# Patient Record
Sex: MALE | Race: WHITE | NOT HISPANIC OR LATINO | Employment: FULL TIME | URBAN - NONMETROPOLITAN AREA
[De-identification: names, ages, dates, MRNs, and addresses within clinical notes are randomized per-mention and may not be internally consistent; named-entity substitution may affect disease eponyms.]

---

## 2020-05-01 ENCOUNTER — HISTORICAL (OUTPATIENT)
Dept: ADMINISTRATIVE | Facility: HOSPITAL | Age: 35
End: 2020-05-01

## 2020-05-02 LAB — CALCIUM BLD-MCNC: NEGATIVE MG/DL

## 2021-01-14 ENCOUNTER — IMMUNIZATION (OUTPATIENT)
Dept: OBSTETRICS AND GYNECOLOGY | Facility: CLINIC | Age: 36
End: 2021-01-14
Payer: COMMERCIAL

## 2021-01-14 DIAGNOSIS — Z23 NEED FOR VACCINATION: ICD-10-CM

## 2021-01-14 PROCEDURE — 91300 COVID-19, MRNA, LNP-S, PF, 30 MCG/0.3 ML DOSE VACCINE: CPT | Mod: PBBFAC

## 2021-02-04 ENCOUNTER — IMMUNIZATION (OUTPATIENT)
Dept: OBSTETRICS AND GYNECOLOGY | Facility: CLINIC | Age: 36
End: 2021-02-04
Payer: COMMERCIAL

## 2021-02-04 DIAGNOSIS — Z23 NEED FOR VACCINATION: Primary | ICD-10-CM

## 2021-02-04 PROCEDURE — 0002A COVID-19, MRNA, LNP-S, PF, 30 MCG/0.3 ML DOSE VACCINE: CPT | Mod: PBBFAC | Performed by: ANESTHESIOLOGY

## 2021-02-04 PROCEDURE — 91300 COVID-19, MRNA, LNP-S, PF, 30 MCG/0.3 ML DOSE VACCINE: CPT | Mod: PBBFAC | Performed by: ANESTHESIOLOGY

## 2021-03-18 PROBLEM — R03.0 ELEVATED BP WITHOUT DIAGNOSIS OF HYPERTENSION: Status: ACTIVE | Noted: 2021-03-18

## 2021-03-18 PROBLEM — R53.83 FATIGUE: Status: ACTIVE | Noted: 2021-03-18

## 2021-03-18 PROBLEM — R40.0 DAYTIME SOMNOLENCE: Status: RESOLVED | Noted: 2021-03-18 | Resolved: 2021-03-18

## 2021-03-18 PROBLEM — R40.0 DAYTIME SOMNOLENCE: Status: ACTIVE | Noted: 2021-03-18

## 2021-03-18 PROBLEM — F41.1 GAD (GENERALIZED ANXIETY DISORDER): Status: ACTIVE | Noted: 2021-03-18

## 2021-05-07 ENCOUNTER — LAB VISIT (OUTPATIENT)
Dept: LAB | Facility: HOSPITAL | Age: 36
End: 2021-05-07
Attending: INTERNAL MEDICINE
Payer: COMMERCIAL

## 2021-05-07 DIAGNOSIS — Z13.29 SCREENING FOR THYROID DISORDER: ICD-10-CM

## 2021-05-07 DIAGNOSIS — Z79.899 ENCOUNTER FOR LONG-TERM (CURRENT) USE OF OTHER MEDICATIONS: ICD-10-CM

## 2021-05-07 DIAGNOSIS — R53.83 FATIGUE, UNSPECIFIED TYPE: ICD-10-CM

## 2021-05-07 DIAGNOSIS — Z13.0 SCREENING, ANEMIA, DEFICIENCY, IRON: ICD-10-CM

## 2021-05-07 DIAGNOSIS — Z13.6 SCREENING FOR CARDIOVASCULAR CONDITION: ICD-10-CM

## 2021-05-07 DIAGNOSIS — Z13.220 SCREENING FOR LIPOID DISORDERS: ICD-10-CM

## 2021-05-07 DIAGNOSIS — Z13.1 SCREENING FOR DIABETES MELLITUS: ICD-10-CM

## 2021-05-07 DIAGNOSIS — R03.0 ELEVATED BP WITHOUT DIAGNOSIS OF HYPERTENSION: ICD-10-CM

## 2021-05-07 LAB
25(OH)D3+25(OH)D2 SERPL-MCNC: 26 NG/ML (ref 30–96)
ALBUMIN SERPL BCP-MCNC: 4.1 G/DL (ref 3.5–5.2)
ALP SERPL-CCNC: 74 U/L (ref 55–135)
ALT SERPL W/O P-5'-P-CCNC: 27 U/L (ref 10–44)
ANION GAP SERPL CALC-SCNC: 2 MMOL/L (ref 8–16)
AST SERPL-CCNC: 13 U/L (ref 10–40)
BASOPHILS # BLD AUTO: 0.02 K/UL (ref 0–0.2)
BASOPHILS NFR BLD: 0.4 % (ref 0–1.9)
BILIRUB SERPL-MCNC: 0.6 MG/DL (ref 0.1–1)
BUN SERPL-MCNC: 13 MG/DL (ref 6–20)
CALCIUM SERPL-MCNC: 8.9 MG/DL (ref 8.7–10.5)
CHLORIDE SERPL-SCNC: 108 MMOL/L (ref 95–110)
CHOLEST SERPL-MCNC: 210 MG/DL (ref 120–199)
CHOLEST/HDLC SERPL: 4.6 {RATIO} (ref 2–5)
CO2 SERPL-SCNC: 32 MMOL/L (ref 23–29)
CREAT SERPL-MCNC: 1 MG/DL (ref 0.5–1.4)
CRP SERPL-MCNC: 0.89 MG/L (ref 0–3.19)
DIFFERENTIAL METHOD: NORMAL
EOSINOPHIL # BLD AUTO: 0.1 K/UL (ref 0–0.5)
EOSINOPHIL NFR BLD: 1.7 % (ref 0–8)
ERYTHROCYTE [DISTWIDTH] IN BLOOD BY AUTOMATED COUNT: 11.7 % (ref 11.5–14.5)
EST. GFR  (AFRICAN AMERICAN): >60 ML/MIN/1.73 M^2
EST. GFR  (NON AFRICAN AMERICAN): >60 ML/MIN/1.73 M^2
ESTIMATED AVG GLUCOSE: 103 MG/DL (ref 68–131)
FOLATE SERPL-MCNC: 15.3 NG/ML (ref 4–24)
GLUCOSE SERPL-MCNC: 98 MG/DL (ref 70–110)
HBA1C MFR BLD: 5.2 % (ref 4–5.6)
HCT VFR BLD AUTO: 45.4 % (ref 40–54)
HCYS SERPL-SCNC: 6.9 UMOL/L (ref 4–16.5)
HDLC SERPL-MCNC: 46 MG/DL (ref 40–75)
HDLC SERPL: 21.9 % (ref 20–50)
HGB BLD-MCNC: 15.2 G/DL (ref 14–18)
IMM GRANULOCYTES # BLD AUTO: 0.01 K/UL (ref 0–0.04)
IMM GRANULOCYTES NFR BLD AUTO: 0.2 % (ref 0–0.5)
INSULIN COLLECTION INTERVAL: NORMAL
INSULIN SERPL-ACNC: 5.4 UU/ML
LDLC SERPL CALC-MCNC: 140.2 MG/DL (ref 63–159)
LYMPHOCYTES # BLD AUTO: 1.6 K/UL (ref 1–4.8)
LYMPHOCYTES NFR BLD: 34.6 % (ref 18–48)
MAGNESIUM SERPL-MCNC: 2.1 MG/DL (ref 1.6–2.6)
MCH RBC QN AUTO: 29.7 PG (ref 27–31)
MCHC RBC AUTO-ENTMCNC: 33.5 G/DL (ref 32–36)
MCV RBC AUTO: 89 FL (ref 82–98)
MONOCYTES # BLD AUTO: 0.5 K/UL (ref 0.3–1)
MONOCYTES NFR BLD: 10.2 % (ref 4–15)
NEUTROPHILS # BLD AUTO: 2.4 K/UL (ref 1.8–7.7)
NEUTROPHILS NFR BLD: 52.9 % (ref 38–73)
NONHDLC SERPL-MCNC: 164 MG/DL
NRBC BLD-RTO: 0 /100 WBC
PLATELET # BLD AUTO: 229 K/UL (ref 150–450)
PMV BLD AUTO: 9.5 FL (ref 9.2–12.9)
POTASSIUM SERPL-SCNC: 4 MMOL/L (ref 3.5–5.1)
PROT SERPL-MCNC: 7.4 G/DL (ref 6–8.4)
RBC # BLD AUTO: 5.11 M/UL (ref 4.6–6.2)
SODIUM SERPL-SCNC: 142 MMOL/L (ref 136–145)
T3FREE SERPL-MCNC: 2.7 PG/ML (ref 2.3–4.2)
T4 FREE SERPL-MCNC: 0.86 NG/DL (ref 0.71–1.51)
TESTOST SERPL-MCNC: 726 NG/DL (ref 304–1227)
TRIGL SERPL-MCNC: 119 MG/DL (ref 30–150)
TSH SERPL DL<=0.005 MIU/L-ACNC: 1.47 UIU/ML (ref 0.4–4)
VIT B12 SERPL-MCNC: 347 PG/ML (ref 210–950)
WBC # BLD AUTO: 4.6 K/UL (ref 3.9–12.7)

## 2021-05-07 PROCEDURE — 84443 ASSAY THYROID STIM HORMONE: CPT | Performed by: INTERNAL MEDICINE

## 2021-05-07 PROCEDURE — 83525 ASSAY OF INSULIN: CPT | Performed by: INTERNAL MEDICINE

## 2021-05-07 PROCEDURE — 83036 HEMOGLOBIN GLYCOSYLATED A1C: CPT | Performed by: INTERNAL MEDICINE

## 2021-05-07 PROCEDURE — 84403 ASSAY OF TOTAL TESTOSTERONE: CPT | Performed by: INTERNAL MEDICINE

## 2021-05-07 PROCEDURE — 85025 COMPLETE CBC W/AUTO DIFF WBC: CPT | Performed by: INTERNAL MEDICINE

## 2021-05-07 PROCEDURE — 83735 ASSAY OF MAGNESIUM: CPT | Performed by: INTERNAL MEDICINE

## 2021-05-07 PROCEDURE — 80061 LIPID PANEL: CPT | Performed by: INTERNAL MEDICINE

## 2021-05-07 PROCEDURE — 83090 ASSAY OF HOMOCYSTEINE: CPT | Performed by: INTERNAL MEDICINE

## 2021-05-07 PROCEDURE — 82607 VITAMIN B-12: CPT | Performed by: INTERNAL MEDICINE

## 2021-05-07 PROCEDURE — 84481 FREE ASSAY (FT-3): CPT | Performed by: INTERNAL MEDICINE

## 2021-05-07 PROCEDURE — 82746 ASSAY OF FOLIC ACID SERUM: CPT | Performed by: INTERNAL MEDICINE

## 2021-05-07 PROCEDURE — 86141 C-REACTIVE PROTEIN HS: CPT | Performed by: INTERNAL MEDICINE

## 2021-05-07 PROCEDURE — 82172 ASSAY OF APOLIPOPROTEIN: CPT | Performed by: INTERNAL MEDICINE

## 2021-05-07 PROCEDURE — 82306 VITAMIN D 25 HYDROXY: CPT | Performed by: INTERNAL MEDICINE

## 2021-05-07 PROCEDURE — 80053 COMPREHEN METABOLIC PANEL: CPT | Performed by: INTERNAL MEDICINE

## 2021-05-07 PROCEDURE — 83695 ASSAY OF LIPOPROTEIN(A): CPT | Performed by: INTERNAL MEDICINE

## 2021-05-07 PROCEDURE — 84439 ASSAY OF FREE THYROXINE: CPT | Performed by: INTERNAL MEDICINE

## 2021-05-10 LAB
APO A-I SERPL-MCNC: 146 MG/DL
APO B SERPL-MCNC: 110 MG/DL
APOLIPOPROTEIN B/A1 RATIO: 0.8

## 2021-05-11 LAB — LPA SERPL-MCNC: 14 MG/DL (ref 0–30)

## 2021-10-21 DIAGNOSIS — Z23 NEED FOR VACCINATION: Primary | ICD-10-CM

## 2021-10-21 PROCEDURE — 0003A COVID-19, MRNA, LNP-S, PF, 30 MCG/0.3 ML DOSE VACCINE: CPT | Mod: PBBFAC | Performed by: ANESTHESIOLOGY

## 2021-10-21 PROCEDURE — 91300 COVID-19, MRNA, LNP-S, PF, 30 MCG/0.3 ML DOSE VACCINE: CPT | Mod: PBBFAC | Performed by: ANESTHESIOLOGY

## 2022-03-17 ENCOUNTER — CLINICAL SUPPORT (OUTPATIENT)
Dept: OTHER | Facility: CLINIC | Age: 37
End: 2022-03-17

## 2022-03-17 DIAGNOSIS — Z00.8 ENCOUNTER FOR OTHER GENERAL EXAMINATION: ICD-10-CM

## 2022-03-18 VITALS — BODY MASS INDEX: 27.55 KG/M2 | HEIGHT: 70 IN

## 2022-03-18 LAB
HDLC SERPL-MCNC: 47 MG/DL
POC CHOLESTEROL, LDL (DOCK): 164.8 MG/DL
POC CHOLESTEROL, TOTAL: 224 MG/DL
POC GLUCOSE, FASTING: 95 MG/DL (ref 60–110)
TRIGL SERPL-MCNC: 61 MG/DL

## 2023-12-21 ENCOUNTER — HOSPITAL ENCOUNTER (OUTPATIENT)
Dept: RADIOLOGY | Facility: HOSPITAL | Age: 38
Discharge: HOME | End: 2023-12-21
Attending: INTERNAL MEDICINE
Payer: COMMERCIAL

## 2023-12-21 DIAGNOSIS — R42 DIZZINESS AND GIDDINESS: ICD-10-CM

## 2023-12-21 PROBLEM — R27.0 ATAXIA: Status: ACTIVE | Noted: 2023-12-21

## 2023-12-21 PROCEDURE — A9585 GADOBUTROL INJECTION: HCPCS | Performed by: INTERNAL MEDICINE

## 2023-12-21 PROCEDURE — 70553 MRI BRAIN STEM W/O & W/DYE: CPT | Mod: TC

## 2023-12-21 PROCEDURE — 25500020 PHARM REV CODE 255: Performed by: INTERNAL MEDICINE

## 2023-12-21 RX ADMIN — GADOBUTROL 10 ML: 604.72 INJECTION INTRAVENOUS at 03:12

## 2023-12-22 ENCOUNTER — TELEPHONE (OUTPATIENT)
Dept: NEUROSURGERY | Facility: CLINIC | Age: 38
End: 2023-12-22
Payer: COMMERCIAL

## 2023-12-26 ENCOUNTER — TELEPHONE (OUTPATIENT)
Dept: NEUROSURGERY | Facility: CLINIC | Age: 38
End: 2023-12-26
Payer: COMMERCIAL

## 2023-12-26 ENCOUNTER — OFFICE VISIT (OUTPATIENT)
Dept: NEUROSURGERY | Facility: CLINIC | Age: 38
End: 2023-12-26
Payer: COMMERCIAL

## 2023-12-26 ENCOUNTER — HOSPITAL ENCOUNTER (OUTPATIENT)
Dept: RADIOLOGY | Facility: HOSPITAL | Age: 38
Discharge: HOME | End: 2023-12-26
Attending: NEUROLOGICAL SURGERY
Payer: COMMERCIAL

## 2023-12-26 ENCOUNTER — LAB VISIT (OUTPATIENT)
Dept: LAB | Facility: HOSPITAL | Age: 38
End: 2023-12-26
Attending: NEUROLOGICAL SURGERY
Payer: COMMERCIAL

## 2023-12-26 VITALS
SYSTOLIC BLOOD PRESSURE: 144 MMHG | HEART RATE: 87 BPM | BODY MASS INDEX: 29.35 KG/M2 | HEIGHT: 70 IN | WEIGHT: 205 LBS | DIASTOLIC BLOOD PRESSURE: 98 MMHG

## 2023-12-26 DIAGNOSIS — G93.89 BRAIN MASS: Primary | ICD-10-CM

## 2023-12-26 DIAGNOSIS — G93.89 BRAIN MASS: ICD-10-CM

## 2023-12-26 PROCEDURE — A9585 GADOBUTROL INJECTION: HCPCS | Performed by: NEUROLOGICAL SURGERY

## 2023-12-26 PROCEDURE — 70552 MRI BRAIN STEM W/DYE: CPT | Mod: 26,,, | Performed by: RADIOLOGY

## 2023-12-26 PROCEDURE — 87086 URINE CULTURE/COLONY COUNT: CPT | Performed by: NEUROLOGICAL SURGERY

## 2023-12-26 PROCEDURE — 85730 THROMBOPLASTIN TIME PARTIAL: CPT | Performed by: NEUROLOGICAL SURGERY

## 2023-12-26 PROCEDURE — 80053 COMPREHEN METABOLIC PANEL: CPT | Performed by: NEUROLOGICAL SURGERY

## 2023-12-26 PROCEDURE — 86901 BLOOD TYPING SEROLOGIC RH(D): CPT | Performed by: NEUROLOGICAL SURGERY

## 2023-12-26 PROCEDURE — 85610 PROTHROMBIN TIME: CPT | Performed by: NEUROLOGICAL SURGERY

## 2023-12-26 PROCEDURE — 3077F SYST BP >= 140 MM HG: CPT | Mod: CPTII,S$GLB,, | Performed by: NEUROLOGICAL SURGERY

## 2023-12-26 PROCEDURE — 36415 COLL VENOUS BLD VENIPUNCTURE: CPT | Performed by: NEUROLOGICAL SURGERY

## 2023-12-26 PROCEDURE — 99204 PR OFFICE/OUTPT VISIT, NEW, LEVL IV, 45-59 MIN: ICD-10-PCS | Mod: S$GLB,,, | Performed by: NEUROLOGICAL SURGERY

## 2023-12-26 PROCEDURE — 3008F PR BODY MASS INDEX (BMI) DOCUMENTED: ICD-10-PCS | Mod: CPTII,S$GLB,, | Performed by: NEUROLOGICAL SURGERY

## 2023-12-26 PROCEDURE — 3008F BODY MASS INDEX DOCD: CPT | Mod: CPTII,S$GLB,, | Performed by: NEUROLOGICAL SURGERY

## 2023-12-26 PROCEDURE — 1159F PR MEDICATION LIST DOCUMENTED IN MEDICAL RECORD: ICD-10-PCS | Mod: CPTII,S$GLB,, | Performed by: NEUROLOGICAL SURGERY

## 2023-12-26 PROCEDURE — 25500020 PHARM REV CODE 255: Performed by: NEUROLOGICAL SURGERY

## 2023-12-26 PROCEDURE — 70552 MRI BRAIN STEALTH W/O FIDUCIALS WITH CONTRAST: ICD-10-PCS | Mod: 26,,, | Performed by: RADIOLOGY

## 2023-12-26 PROCEDURE — 3077F PR MOST RECENT SYSTOLIC BLOOD PRESSURE >= 140 MM HG: ICD-10-PCS | Mod: CPTII,S$GLB,, | Performed by: NEUROLOGICAL SURGERY

## 2023-12-26 PROCEDURE — 85025 COMPLETE CBC W/AUTO DIFF WBC: CPT | Performed by: NEUROLOGICAL SURGERY

## 2023-12-26 PROCEDURE — 3080F PR MOST RECENT DIASTOLIC BLOOD PRESSURE >= 90 MM HG: ICD-10-PCS | Mod: CPTII,S$GLB,, | Performed by: NEUROLOGICAL SURGERY

## 2023-12-26 PROCEDURE — 99999 PR PBB SHADOW E&M-EST. PATIENT-LVL III: CPT | Mod: PBBFAC,,, | Performed by: NEUROLOGICAL SURGERY

## 2023-12-26 PROCEDURE — 99204 OFFICE O/P NEW MOD 45 MIN: CPT | Mod: S$GLB,,, | Performed by: NEUROLOGICAL SURGERY

## 2023-12-26 PROCEDURE — 81001 URINALYSIS AUTO W/SCOPE: CPT | Performed by: NEUROLOGICAL SURGERY

## 2023-12-26 PROCEDURE — 99999 PR PBB SHADOW E&M-EST. PATIENT-LVL III: ICD-10-PCS | Mod: PBBFAC,,, | Performed by: NEUROLOGICAL SURGERY

## 2023-12-26 PROCEDURE — 1159F MED LIST DOCD IN RCRD: CPT | Mod: CPTII,S$GLB,, | Performed by: NEUROLOGICAL SURGERY

## 2023-12-26 PROCEDURE — 3080F DIAST BP >= 90 MM HG: CPT | Mod: CPTII,S$GLB,, | Performed by: NEUROLOGICAL SURGERY

## 2023-12-26 PROCEDURE — 1160F PR REVIEW ALL MEDS BY PRESCRIBER/CLIN PHARMACIST DOCUMENTED: ICD-10-PCS | Mod: CPTII,S$GLB,, | Performed by: NEUROLOGICAL SURGERY

## 2023-12-26 PROCEDURE — 1160F RVW MEDS BY RX/DR IN RCRD: CPT | Mod: CPTII,S$GLB,, | Performed by: NEUROLOGICAL SURGERY

## 2023-12-26 PROCEDURE — 70552 MRI BRAIN STEM W/DYE: CPT | Mod: TC

## 2023-12-26 RX ORDER — GADOBUTROL 604.72 MG/ML
10 INJECTION INTRAVENOUS
Status: COMPLETED | OUTPATIENT
Start: 2023-12-26 | End: 2023-12-26

## 2023-12-26 RX ADMIN — GADOBUTROL 10 ML: 604.72 INJECTION INTRAVENOUS at 01:12

## 2023-12-26 NOTE — PROGRESS NOTES
Neurosurgery  History & Physical    SUBJECTIVE:     Chief Complaint: Imbalance    History of Present Illness:  Mr. Sukumar Wiseman this is a very pleasant 38-year-old man who was otherwise healthy.  Patient reports he began having episodes of imbalance more than a month ago.  He reports that he has not had any falls but feels as if he might.  The patient reports that he has not had any other symptoms.  He does report he has had intermittent headaches but believe that those are secondary to stress.  The patient was seen with his wife who also added to the history.  They both report that he has been otherwise healthy and has no significant medical problems.  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    Review of patient's allergies indicates:  No Known Allergies    Current Outpatient Medications   Medication Sig Dispense Refill    ondansetron (ZOFRAN-ODT) 4 MG TbDL Take 1 tablet (4 mg total) by mouth 3 (three) times daily as needed (nausea and vomiting). 30 tablet 0     No current facility-administered medications for this visit.       History reviewed. No pertinent past medical history.  History reviewed. No pertinent surgical history.  Family History       Problem Relation (Age of Onset)    Colon cancer Maternal Grandfather    Hyperlipidemia Mother    Hypertension Mother, Father          Social History     Socioeconomic History    Marital status:    Tobacco Use    Smoking status: Never   Substance and Sexual Activity    Alcohol use: Yes     Social Determinants of Health     Financial Resource Strain: Low Risk  (12/25/2023)    Overall Financial Resource Strain (CARDIA)     Difficulty of Paying Living Expenses: Not hard at all   Food Insecurity: No Food Insecurity (12/25/2023)    Hunger Vital Sign     Worried About Running Out of Food in the Last Year: Never true     Ran Out of Food in the Last Year: Never true  "  Transportation Needs: No Transportation Needs (12/25/2023)    PRAPARE - Transportation     Lack of Transportation (Medical): No     Lack of Transportation (Non-Medical): No   Physical Activity: Insufficiently Active (12/25/2023)    Exercise Vital Sign     Days of Exercise per Week: 3 days     Minutes of Exercise per Session: 30 min   Stress: Stress Concern Present (12/25/2023)    [employer] [place] [employer] - Occupational Stress Questionnaire     Feeling of Stress : Very much   Social Connections: Unknown (12/25/2023)    Social Connection and Isolation Panel [NHANES]     Frequency of Communication with Friends and Family: More than three times a week     Frequency of Social Gatherings with Friends and Family: Once a week     Active Member of Clubs or Organizations: No     Attends Club or Organization Meetings: Patient declined     Marital Status:    Housing Stability: Low Risk  (12/25/2023)    Housing Stability Vital Sign     Unable to Pay for Housing in the Last Year: No     Number of Places Lived in the Last Year: 1     Unstable Housing in the Last Year: No       Review of Systems   Constitutional: Negative.    HENT: Negative.     Eyes: Negative.    Neurological:  Positive for dizziness and headaches. Negative for tremors, seizures, syncope, facial asymmetry, speech difficulty, weakness, light-headedness and numbness.   Psychiatric/Behavioral: Negative.         OBJECTIVE:     Vital Signs  Pulse: 87  BP: (!) 144/98  Pain Score: 0-No pain  Height: 5' 10" (177.8 cm)  Weight: 93 kg (205 lb)  Body mass index is 29.41 kg/m².      Physical Exam:    Constitutional: He appears well-developed and well-nourished.     Abdominal: Soft.     Musculoskeletal: Gait is normal.        Neck: Muscle strength is 5/5. Tone is normal.        Back: Muscle strength is 5/5. Tone is normal.        Right Upper Extremities: Muscle strength is 5/5. Tone is normal.        Left Upper Extremities: Muscle strength is 5/5. Tone " is normal.         Diagnostic Results:  MRI Brain (12/22)    Intra-axial mass centered about the right middle cerebellar peduncle with involvement of the right lateral aspect of the 4th ventricle and extending into the right cerebellopontine angle cistern.  The mass measures approximately 2.4 x 2.4 cm in the axial plane and 2.3 cm in craniocaudal dimension (images 19-21 of series 9 axial, images 21-18 of series 10 coronal).  No surrounding vasogenic edema.  No other masses.  No ventricular dilatation.     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  I reviewed the images with the patient and his wife.  I have counseled them that I am recommending a surgery to tentatively know the diagnosis.  I performed a minimally invasive trans cerebellar approach to this tumor.  We would send intraoperative samples and would attempt complete resection at that time based on the intraoperative evaluation.  The patient and his wife understand.  They wished to proceed.  We will schedule this 1st availability.        Note dictated with voice recognition software, please excuse any grammatical errors.

## 2023-12-26 NOTE — H&P (VIEW-ONLY)
Neurosurgery  History & Physical    SUBJECTIVE:     Chief Complaint: Imbalance    History of Present Illness:  Mr. Sukumar Wiseman this is a very pleasant 38-year-old man who was otherwise healthy.  Patient reports he began having episodes of imbalance more than a month ago.  He reports that he has not had any falls but feels as if he might.  The patient reports that he has not had any other symptoms.  He does report he has had intermittent headaches but believe that those are secondary to stress.  The patient was seen with his wife who also added to the history.  They both report that he has been otherwise healthy and has no significant medical problems.  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    Review of patient's allergies indicates:  No Known Allergies    Current Outpatient Medications   Medication Sig Dispense Refill    ondansetron (ZOFRAN-ODT) 4 MG TbDL Take 1 tablet (4 mg total) by mouth 3 (three) times daily as needed (nausea and vomiting). 30 tablet 0     No current facility-administered medications for this visit.       History reviewed. No pertinent past medical history.  History reviewed. No pertinent surgical history.  Family History       Problem Relation (Age of Onset)    Colon cancer Maternal Grandfather    Hyperlipidemia Mother    Hypertension Mother, Father          Social History     Socioeconomic History    Marital status:    Tobacco Use    Smoking status: Never   Substance and Sexual Activity    Alcohol use: Yes     Social Determinants of Health     Financial Resource Strain: Low Risk  (12/25/2023)    Overall Financial Resource Strain (CARDIA)     Difficulty of Paying Living Expenses: Not hard at all   Food Insecurity: No Food Insecurity (12/25/2023)    Hunger Vital Sign     Worried About Running Out of Food in the Last Year: Never true     Ran Out of Food in the Last Year: Never true  "  Transportation Needs: No Transportation Needs (12/25/2023)    PRAPARE - Transportation     Lack of Transportation (Medical): No     Lack of Transportation (Non-Medical): No   Physical Activity: Insufficiently Active (12/25/2023)    Exercise Vital Sign     Days of Exercise per Week: 3 days     Minutes of Exercise per Session: 30 min   Stress: Stress Concern Present (12/25/2023)    [employer] [place] [employer] - Occupational Stress Questionnaire     Feeling of Stress : Very much   Social Connections: Unknown (12/25/2023)    Social Connection and Isolation Panel [NHANES]     Frequency of Communication with Friends and Family: More than three times a week     Frequency of Social Gatherings with Friends and Family: Once a week     Active Member of Clubs or Organizations: No     Attends Club or Organization Meetings: Patient declined     Marital Status:    Housing Stability: Low Risk  (12/25/2023)    Housing Stability Vital Sign     Unable to Pay for Housing in the Last Year: No     Number of Places Lived in the Last Year: 1     Unstable Housing in the Last Year: No       Review of Systems   Constitutional: Negative.    HENT: Negative.     Eyes: Negative.    Neurological:  Positive for dizziness and headaches. Negative for tremors, seizures, syncope, facial asymmetry, speech difficulty, weakness, light-headedness and numbness.   Psychiatric/Behavioral: Negative.         OBJECTIVE:     Vital Signs  Pulse: 87  BP: (!) 144/98  Pain Score: 0-No pain  Height: 5' 10" (177.8 cm)  Weight: 93 kg (205 lb)  Body mass index is 29.41 kg/m².      Physical Exam:    Constitutional: He appears well-developed and well-nourished.     Abdominal: Soft.     Musculoskeletal: Gait is normal.        Neck: Muscle strength is 5/5. Tone is normal.        Back: Muscle strength is 5/5. Tone is normal.        Right Upper Extremities: Muscle strength is 5/5. Tone is normal.        Left Upper Extremities: Muscle strength is 5/5. Tone " is normal.         Diagnostic Results:  MRI Brain (12/22)    Intra-axial mass centered about the right middle cerebellar peduncle with involvement of the right lateral aspect of the 4th ventricle and extending into the right cerebellopontine angle cistern.  The mass measures approximately 2.4 x 2.4 cm in the axial plane and 2.3 cm in craniocaudal dimension (images 19-21 of series 9 axial, images 21-18 of series 10 coronal).  No surrounding vasogenic edema.  No other masses.  No ventricular dilatation.     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  I reviewed the images with the patient and his wife.  I have counseled them that I am recommending a surgery to tentatively know the diagnosis.  I performed a minimally invasive trans cerebellar approach to this tumor.  We would send intraoperative samples and would attempt complete resection at that time based on the intraoperative evaluation.  The patient and his wife understand.  They wished to proceed.  We will schedule this 1st availability.        Note dictated with voice recognition software, please excuse any grammatical errors.

## 2023-12-27 LAB — BACTERIA UR CULT: NO GROWTH

## 2023-12-28 ENCOUNTER — TELEPHONE (OUTPATIENT)
Dept: NEUROSURGERY | Facility: CLINIC | Age: 38
End: 2023-12-28
Payer: COMMERCIAL

## 2023-12-28 NOTE — TELEPHONE ENCOUNTER
S/w spouse. Informed her of moving the pts sx. Spouse said she would let him know. V/u and thanks.

## 2024-01-02 NOTE — PRE-PROCEDURE INSTRUCTIONS
PreOp Instructions given:   - Verbal medication information (what to hold and what to take)   - NPO guidelines 2300  - Arrival place directions given; time to be given the day before procedure by the   Surgeon's Office DOSC  - Bathing with antibacterial soap   - Don't wear any jewelry or bring any valuables AM of surgery   - No makeup or moisturizer to face   - No perfume/cologne, powder, lotions or aftershave   Pt. verbalized understanding.   Pt denies any h/o Anesthesia/Sedation complications or side effects.  Patient does not know arrival time.  Explained that this information comes from the surgeon's office and if they haven't heard from them by 2 or 3 pm to call the office.  Patient stated an understanding.

## 2024-01-03 ENCOUNTER — TELEPHONE (OUTPATIENT)
Dept: NEUROSURGERY | Facility: CLINIC | Age: 39
End: 2024-01-03
Payer: COMMERCIAL

## 2024-01-03 NOTE — TELEPHONE ENCOUNTER
Mr. Wiseman notified of arrival time for procedure.  Pt instructed to be at the [facility] desk at 8am. NPO at midnight.  Shower with dial antibacterial soap tonight + tomorrow morning.  Shampoo hair with regular shampoo. Questions and concerns addressed.

## 2024-01-04 ENCOUNTER — ANESTHESIA (OUTPATIENT)
Dept: SURGERY | Facility: HOSPITAL | Age: 39
DRG: 027 | End: 2024-01-04
Payer: COMMERCIAL

## 2024-01-04 ENCOUNTER — ANESTHESIA EVENT (OUTPATIENT)
Dept: SURGERY | Facility: HOSPITAL | Age: 39
DRG: 027 | End: 2024-01-04
Payer: COMMERCIAL

## 2024-01-04 ENCOUNTER — HOSPITAL ENCOUNTER (INPATIENT)
Facility: HOSPITAL | Age: 39
LOS: 3 days | Discharge: HOME | DRG: 027 | End: 2024-01-07
Attending: NEUROLOGICAL SURGERY | Admitting: NEUROLOGICAL SURGERY
Payer: COMMERCIAL

## 2024-01-04 DIAGNOSIS — D49.6 BRAIN TUMOR: ICD-10-CM

## 2024-01-04 DIAGNOSIS — Z98.890 S/P CRANIOTOMY: ICD-10-CM

## 2024-01-04 PROBLEM — G93.89 CEREBELLAR MASS: Status: ACTIVE | Noted: 2024-01-04

## 2024-01-04 LAB
ABO + RH BLD: NORMAL
ANION GAP SERPL CALC-SCNC: 12 MMOL/L (ref 8–16)
APTT PPP: 27.5 SEC (ref 21–32)
BASOPHILS # BLD AUTO: 0.02 K/UL (ref 0–0.2)
BASOPHILS NFR BLD: 0.3 % (ref 0–1.9)
BLD GP AB SCN CELLS X3 SERPL QL: NORMAL
BUN SERPL-MCNC: 11 MG/DL (ref 6–20)
CALCIUM SERPL-MCNC: 9.5 MG/DL (ref 8.7–10.5)
CHLORIDE SERPL-SCNC: 103 MMOL/L (ref 95–110)
CHOLEST SERPL-MCNC: 191 MG/DL (ref 120–199)
CHOLEST/HDLC SERPL: 5.6 {RATIO} (ref 2–5)
CO2 SERPL-SCNC: 27 MMOL/L (ref 23–29)
CREAT SERPL-MCNC: 0.9 MG/DL (ref 0.5–1.4)
DIFFERENTIAL METHOD BLD: ABNORMAL
EOSINOPHIL # BLD AUTO: 0 K/UL (ref 0–0.5)
EOSINOPHIL NFR BLD: 0.5 % (ref 0–8)
ERYTHROCYTE [DISTWIDTH] IN BLOOD BY AUTOMATED COUNT: 11.6 % (ref 11.5–14.5)
EST. GFR  (NO RACE VARIABLE): >60 ML/MIN/1.73 M^2
ESTIMATED AVG GLUCOSE: 103 MG/DL (ref 68–131)
GLUCOSE SERPL-MCNC: 94 MG/DL (ref 70–110)
HBA1C MFR BLD: 5.2 % (ref 4–5.6)
HCT VFR BLD AUTO: 48.7 % (ref 40–54)
HDLC SERPL-MCNC: 34 MG/DL (ref 40–75)
HDLC SERPL: 17.8 % (ref 20–50)
HGB BLD-MCNC: 16.7 G/DL (ref 14–18)
IMM GRANULOCYTES # BLD AUTO: 0.03 K/UL (ref 0–0.04)
IMM GRANULOCYTES NFR BLD AUTO: 0.5 % (ref 0–0.5)
INR PPP: 1 (ref 0.8–1.2)
LDLC SERPL CALC-MCNC: 138 MG/DL (ref 63–159)
LYMPHOCYTES # BLD AUTO: 1.4 K/UL (ref 1–4.8)
LYMPHOCYTES NFR BLD: 21.7 % (ref 18–48)
MCH RBC QN AUTO: 30.4 PG (ref 27–31)
MCHC RBC AUTO-ENTMCNC: 34.3 G/DL (ref 32–36)
MCV RBC AUTO: 89 FL (ref 82–98)
MONOCYTES # BLD AUTO: 0.4 K/UL (ref 0.3–1)
MONOCYTES NFR BLD: 6.7 % (ref 4–15)
NEUTROPHILS # BLD AUTO: 4.5 K/UL (ref 1.8–7.7)
NEUTROPHILS NFR BLD: 70.3 % (ref 38–73)
NONHDLC SERPL-MCNC: 157 MG/DL
NRBC BLD-RTO: 0 /100 WBC
PLATELET # BLD AUTO: 289 K/UL (ref 150–450)
PMV BLD AUTO: 9.1 FL (ref 9.2–12.9)
POCT GLUCOSE: 140 MG/DL (ref 70–110)
POTASSIUM SERPL-SCNC: 4.1 MMOL/L (ref 3.5–5.1)
PROTHROMBIN TIME: 11 SEC (ref 9–12.5)
RBC # BLD AUTO: 5.5 M/UL (ref 4.6–6.2)
SODIUM SERPL-SCNC: 142 MMOL/L (ref 136–145)
SPECIMEN OUTDATE: NORMAL
TRIGL SERPL-MCNC: 95 MG/DL (ref 30–150)
TSH SERPL DL<=0.005 MIU/L-ACNC: 0.54 UIU/ML (ref 0.4–4)
WBC # BLD AUTO: 6.4 K/UL (ref 3.9–12.7)

## 2024-01-04 PROCEDURE — 25000003 PHARM REV CODE 250: Performed by: NURSE ANESTHETIST, CERTIFIED REGISTERED

## 2024-01-04 PROCEDURE — 36000711: Performed by: NEUROLOGICAL SURGERY

## 2024-01-04 PROCEDURE — 88381 MICRODISSECTION MANUAL: CPT | Performed by: STUDENT IN AN ORGANIZED HEALTH CARE EDUCATION/TRAINING PROGRAM

## 2024-01-04 PROCEDURE — 00B70ZZ EXCISION OF CEREBRAL HEMISPHERE, OPEN APPROACH: ICD-10-PCS | Performed by: NEUROLOGICAL SURGERY

## 2024-01-04 PROCEDURE — 88307 TISSUE EXAM BY PATHOLOGIST: CPT | Mod: 26,,, | Performed by: STUDENT IN AN ORGANIZED HEALTH CARE EDUCATION/TRAINING PROGRAM

## 2024-01-04 PROCEDURE — 61518 REMOVAL OF BRAIN LESION: CPT | Mod: ,,, | Performed by: NEUROLOGICAL SURGERY

## 2024-01-04 PROCEDURE — 63600175 PHARM REV CODE 636 W HCPCS

## 2024-01-04 PROCEDURE — 88331 PATH CONSLTJ SURG 1 BLK 1SPC: CPT | Performed by: STUDENT IN AN ORGANIZED HEALTH CARE EDUCATION/TRAINING PROGRAM

## 2024-01-04 PROCEDURE — 85025 COMPLETE CBC W/AUTO DIFF WBC: CPT

## 2024-01-04 PROCEDURE — 63600175 PHARM REV CODE 636 W HCPCS: Mod: JG | Performed by: NEUROLOGICAL SURGERY

## 2024-01-04 PROCEDURE — 93010 ELECTROCARDIOGRAM REPORT: CPT | Mod: ,,, | Performed by: INTERNAL MEDICINE

## 2024-01-04 PROCEDURE — 25000003 PHARM REV CODE 250

## 2024-01-04 PROCEDURE — 86920 COMPATIBILITY TEST SPIN: CPT | Performed by: NEUROLOGICAL SURGERY

## 2024-01-04 PROCEDURE — 25000003 PHARM REV CODE 250: Performed by: NEUROLOGICAL SURGERY

## 2024-01-04 PROCEDURE — 94761 N-INVAS EAR/PLS OXIMETRY MLT: CPT

## 2024-01-04 PROCEDURE — 37000008 HC ANESTHESIA 1ST 15 MINUTES: Performed by: NEUROLOGICAL SURGERY

## 2024-01-04 PROCEDURE — 25000003 PHARM REV CODE 250: Performed by: NURSE PRACTITIONER

## 2024-01-04 PROCEDURE — 93005 ELECTROCARDIOGRAM TRACING: CPT

## 2024-01-04 PROCEDURE — 37000009 HC ANESTHESIA EA ADD 15 MINS: Performed by: NEUROLOGICAL SURGERY

## 2024-01-04 PROCEDURE — 99291 CRITICAL CARE FIRST HOUR: CPT | Mod: ,,, | Performed by: INTERNAL MEDICINE

## 2024-01-04 PROCEDURE — 84443 ASSAY THYROID STIM HORMONE: CPT | Performed by: NURSE PRACTITIONER

## 2024-01-04 PROCEDURE — 27201423 OPTIME MED/SURG SUP & DEVICES STERILE SUPPLY: Performed by: NEUROLOGICAL SURGERY

## 2024-01-04 PROCEDURE — 83036 HEMOGLOBIN GLYCOSYLATED A1C: CPT | Performed by: NURSE PRACTITIONER

## 2024-01-04 PROCEDURE — D9220A PRA ANESTHESIA: Mod: CRNA,,, | Performed by: NURSE ANESTHETIST, CERTIFIED REGISTERED

## 2024-01-04 PROCEDURE — 8E09XBG COMPUTER ASSISTED PROCEDURE OF HEAD AND NECK REGION, WITH COMPUTERIZED TOMOGRAPHY: ICD-10-PCS | Performed by: NEUROLOGICAL SURGERY

## 2024-01-04 PROCEDURE — 69990 MICROSURGERY ADD-ON: CPT | Mod: 59,,, | Performed by: NEUROLOGICAL SURGERY

## 2024-01-04 PROCEDURE — D9220A PRA ANESTHESIA: Mod: ANES,,, | Performed by: ANESTHESIOLOGY

## 2024-01-04 PROCEDURE — 36620 INSERTION CATHETER ARTERY: CPT | Mod: 59,,, | Performed by: ANESTHESIOLOGY

## 2024-01-04 PROCEDURE — 63600175 PHARM REV CODE 636 W HCPCS: Performed by: NURSE PRACTITIONER

## 2024-01-04 PROCEDURE — 63600175 PHARM REV CODE 636 W HCPCS: Performed by: NURSE ANESTHETIST, CERTIFIED REGISTERED

## 2024-01-04 PROCEDURE — 80048 BASIC METABOLIC PNL TOTAL CA: CPT

## 2024-01-04 PROCEDURE — 36000710: Performed by: NEUROLOGICAL SURGERY

## 2024-01-04 PROCEDURE — 85610 PROTHROMBIN TIME: CPT

## 2024-01-04 PROCEDURE — 86901 BLOOD TYPING SEROLOGIC RH(D): CPT | Performed by: NEUROLOGICAL SURGERY

## 2024-01-04 PROCEDURE — 88341 IMHCHEM/IMCYTCHM EA ADD ANTB: CPT | Performed by: STUDENT IN AN ORGANIZED HEALTH CARE EDUCATION/TRAINING PROGRAM

## 2024-01-04 PROCEDURE — 88307 TISSUE EXAM BY PATHOLOGIST: CPT | Mod: 59 | Performed by: STUDENT IN AN ORGANIZED HEALTH CARE EDUCATION/TRAINING PROGRAM

## 2024-01-04 PROCEDURE — 61781 SCAN PROC CRANIAL INTRA: CPT | Mod: ,,, | Performed by: NEUROLOGICAL SURGERY

## 2024-01-04 PROCEDURE — 88342 IMHCHEM/IMCYTCHM 1ST ANTB: CPT | Mod: 26,,, | Performed by: STUDENT IN AN ORGANIZED HEALTH CARE EDUCATION/TRAINING PROGRAM

## 2024-01-04 PROCEDURE — C1713 ANCHOR/SCREW BN/BN,TIS/BN: HCPCS | Performed by: NEUROLOGICAL SURGERY

## 2024-01-04 PROCEDURE — 88342 IMHCHEM/IMCYTCHM 1ST ANTB: CPT | Performed by: STUDENT IN AN ORGANIZED HEALTH CARE EDUCATION/TRAINING PROGRAM

## 2024-01-04 PROCEDURE — 20000000 HC ICU ROOM

## 2024-01-04 PROCEDURE — 80061 LIPID PANEL: CPT | Performed by: NURSE PRACTITIONER

## 2024-01-04 PROCEDURE — 88341 IMHCHEM/IMCYTCHM EA ADD ANTB: CPT | Mod: 26,,, | Performed by: STUDENT IN AN ORGANIZED HEALTH CARE EDUCATION/TRAINING PROGRAM

## 2024-01-04 PROCEDURE — 36415 COLL VENOUS BLD VENIPUNCTURE: CPT | Performed by: NEUROLOGICAL SURGERY

## 2024-01-04 PROCEDURE — 81455 SO/HL 51/>GSAP DNA/DNA&RNA: CPT | Performed by: STUDENT IN AN ORGANIZED HEALTH CARE EDUCATION/TRAINING PROGRAM

## 2024-01-04 PROCEDURE — 88331 PATH CONSLTJ SURG 1 BLK 1SPC: CPT | Mod: 26,,, | Performed by: STUDENT IN AN ORGANIZED HEALTH CARE EDUCATION/TRAINING PROGRAM

## 2024-01-04 PROCEDURE — 85730 THROMBOPLASTIN TIME PARTIAL: CPT

## 2024-01-04 DEVICE — SCREW UN3 AXS SD 1.5X4MM: Type: IMPLANTABLE DEVICE | Site: CRANIAL | Status: FUNCTIONAL

## 2024-01-04 DEVICE — PLATE BONE BUR HOLE COVER 10MM: Type: IMPLANTABLE DEVICE | Site: CRANIAL | Status: FUNCTIONAL

## 2024-01-04 RX ORDER — OXYCODONE HYDROCHLORIDE 10 MG/1
10 TABLET ORAL EVERY 4 HOURS PRN
Status: CANCELLED | OUTPATIENT
Start: 2024-01-04

## 2024-01-04 RX ORDER — SODIUM,POTASSIUM PHOSPHATES 280-250MG
2 POWDER IN PACKET (EA) ORAL
Status: DISCONTINUED | OUTPATIENT
Start: 2024-01-04 | End: 2024-01-05

## 2024-01-04 RX ORDER — LABETALOL HCL 20 MG/4 ML
10 SYRINGE (ML) INTRAVENOUS EVERY 4 HOURS PRN
Status: DISCONTINUED | OUTPATIENT
Start: 2024-01-04 | End: 2024-01-07 | Stop reason: HOSPADM

## 2024-01-04 RX ORDER — ACETAMINOPHEN 325 MG/1
650 TABLET ORAL EVERY 4 HOURS PRN
Status: CANCELLED | OUTPATIENT
Start: 2024-01-04

## 2024-01-04 RX ORDER — ONDANSETRON HYDROCHLORIDE 2 MG/ML
4 INJECTION, SOLUTION INTRAVENOUS ONCE AS NEEDED
Status: CANCELLED | OUTPATIENT
Start: 2024-01-04 | End: 2035-06-02

## 2024-01-04 RX ORDER — MUPIROCIN 20 MG/G
1 OINTMENT TOPICAL 2 TIMES DAILY
Status: DISPENSED | OUTPATIENT
Start: 2024-01-04 | End: 2024-01-05

## 2024-01-04 RX ORDER — LIDOCAINE HYDROCHLORIDE AND EPINEPHRINE 10; 10 MG/ML; UG/ML
INJECTION, SOLUTION INFILTRATION; PERINEURAL
Status: DISCONTINUED | OUTPATIENT
Start: 2024-01-04 | End: 2024-01-04 | Stop reason: HOSPADM

## 2024-01-04 RX ORDER — DEXMEDETOMIDINE HYDROCHLORIDE 100 UG/ML
INJECTION, SOLUTION INTRAVENOUS
Status: DISCONTINUED | OUTPATIENT
Start: 2024-01-04 | End: 2024-01-04

## 2024-01-04 RX ORDER — PROCHLORPERAZINE EDISYLATE 5 MG/ML
5 INJECTION INTRAMUSCULAR; INTRAVENOUS EVERY 6 HOURS PRN
Status: DISCONTINUED | OUTPATIENT
Start: 2024-01-04 | End: 2024-01-06

## 2024-01-04 RX ORDER — BACITRACIN ZINC 500 UNIT/G
OINTMENT (GRAM) TOPICAL
Status: DISCONTINUED | OUTPATIENT
Start: 2024-01-04 | End: 2024-01-04 | Stop reason: HOSPADM

## 2024-01-04 RX ORDER — HYDROMORPHONE HYDROCHLORIDE 1 MG/ML
0.2 INJECTION, SOLUTION INTRAMUSCULAR; INTRAVENOUS; SUBCUTANEOUS EVERY 5 MIN PRN
Status: CANCELLED | OUTPATIENT
Start: 2024-01-04

## 2024-01-04 RX ORDER — ACETAMINOPHEN 325 MG/1
650 TABLET ORAL EVERY 6 HOURS PRN
Status: DISCONTINUED | OUTPATIENT
Start: 2024-01-04 | End: 2024-01-06

## 2024-01-04 RX ORDER — ONDANSETRON 4 MG/1
8 TABLET, FILM COATED ORAL 2 TIMES DAILY
Status: ON HOLD | COMMUNITY
End: 2024-01-07 | Stop reason: HOSPADM

## 2024-01-04 RX ORDER — ONDANSETRON HYDROCHLORIDE 2 MG/ML
INJECTION, SOLUTION INTRAVENOUS
Status: COMPLETED
Start: 2024-01-04 | End: 2024-01-04

## 2024-01-04 RX ORDER — PROMETHAZINE HYDROCHLORIDE 25 MG/ML
25 INJECTION, SOLUTION INTRAMUSCULAR; INTRAVENOUS EVERY 6 HOURS PRN
Status: DISCONTINUED | OUTPATIENT
Start: 2024-01-04 | End: 2024-01-04

## 2024-01-04 RX ORDER — ONDANSETRON HYDROCHLORIDE 2 MG/ML
4 INJECTION, SOLUTION INTRAVENOUS EVERY 6 HOURS PRN
Status: DISCONTINUED | OUTPATIENT
Start: 2024-01-04 | End: 2024-01-06

## 2024-01-04 RX ORDER — SUCCINYLCHOLINE CHLORIDE 20 MG/ML
INJECTION INTRAMUSCULAR; INTRAVENOUS
Status: DISCONTINUED | OUTPATIENT
Start: 2024-01-04 | End: 2024-01-04

## 2024-01-04 RX ORDER — LABETALOL HYDROCHLORIDE 5 MG/ML
INJECTION, SOLUTION INTRAVENOUS
Status: DISCONTINUED | OUTPATIENT
Start: 2024-01-04 | End: 2024-01-04

## 2024-01-04 RX ORDER — MIDAZOLAM HYDROCHLORIDE 1 MG/ML
INJECTION, SOLUTION INTRAMUSCULAR; INTRAVENOUS
Status: DISCONTINUED | OUTPATIENT
Start: 2024-01-04 | End: 2024-01-04

## 2024-01-04 RX ORDER — NICARDIPINE HYDROCHLORIDE 0.2 MG/ML
0-15 INJECTION INTRAVENOUS CONTINUOUS
Status: DISCONTINUED | OUTPATIENT
Start: 2024-01-04 | End: 2024-01-05

## 2024-01-04 RX ORDER — MUPIROCIN 20 MG/G
OINTMENT TOPICAL 2 TIMES DAILY
Status: CANCELLED | OUTPATIENT
Start: 2024-01-04 | End: 2024-01-09

## 2024-01-04 RX ORDER — ROCURONIUM BROMIDE 10 MG/ML
INJECTION, SOLUTION INTRAVENOUS
Status: DISCONTINUED | OUTPATIENT
Start: 2024-01-04 | End: 2024-01-04

## 2024-01-04 RX ORDER — OXYCODONE HYDROCHLORIDE 5 MG/1
5 TABLET ORAL EVERY 6 HOURS PRN
Status: DISCONTINUED | OUTPATIENT
Start: 2024-01-04 | End: 2024-01-07 | Stop reason: HOSPADM

## 2024-01-04 RX ORDER — DEXAMETHASONE SODIUM PHOSPHATE 4 MG/ML
INJECTION, SOLUTION INTRA-ARTICULAR; INTRALESIONAL; INTRAMUSCULAR; INTRAVENOUS; SOFT TISSUE
Status: DISCONTINUED | OUTPATIENT
Start: 2024-01-04 | End: 2024-01-04

## 2024-01-04 RX ORDER — FAMOTIDINE 10 MG/ML
20 INJECTION INTRAVENOUS EVERY 12 HOURS
Status: CANCELLED | OUTPATIENT
Start: 2024-01-04

## 2024-01-04 RX ORDER — ONDANSETRON HYDROCHLORIDE 2 MG/ML
4 INJECTION, SOLUTION INTRAVENOUS EVERY 12 HOURS PRN
Status: CANCELLED | OUTPATIENT
Start: 2024-01-04

## 2024-01-04 RX ORDER — HYDRALAZINE HYDROCHLORIDE 20 MG/ML
10 INJECTION INTRAMUSCULAR; INTRAVENOUS EVERY 4 HOURS PRN
Status: DISCONTINUED | OUTPATIENT
Start: 2024-01-04 | End: 2024-01-07 | Stop reason: HOSPADM

## 2024-01-04 RX ORDER — PROPOFOL 10 MG/ML
VIAL (ML) INTRAVENOUS
Status: DISCONTINUED | OUTPATIENT
Start: 2024-01-04 | End: 2024-01-04

## 2024-01-04 RX ORDER — OXYCODONE HYDROCHLORIDE 5 MG/1
5 TABLET ORAL EVERY 4 HOURS PRN
Status: CANCELLED | OUTPATIENT
Start: 2024-01-04

## 2024-01-04 RX ORDER — AMOXICILLIN 250 MG
1 CAPSULE ORAL DAILY
Status: DISCONTINUED | OUTPATIENT
Start: 2024-01-05 | End: 2024-01-07 | Stop reason: HOSPADM

## 2024-01-04 RX ORDER — LANOLIN ALCOHOL/MO/W.PET/CERES
800 CREAM (GRAM) TOPICAL
Status: DISCONTINUED | OUTPATIENT
Start: 2024-01-04 | End: 2024-01-05

## 2024-01-04 RX ORDER — HYDRALAZINE HYDROCHLORIDE 20 MG/ML
INJECTION INTRAMUSCULAR; INTRAVENOUS
Status: DISPENSED
Start: 2024-01-04 | End: 2024-01-05

## 2024-01-04 RX ORDER — BUPIVACAINE HYDROCHLORIDE 5 MG/ML
INJECTION, SOLUTION EPIDURAL; INTRACAUDAL
Status: DISCONTINUED | OUTPATIENT
Start: 2024-01-04 | End: 2024-01-04 | Stop reason: HOSPADM

## 2024-01-04 RX ORDER — SUFENTANIL CITRATE 50 UG/ML
INJECTION EPIDURAL; INTRAVENOUS CONTINUOUS PRN
Status: DISCONTINUED | OUTPATIENT
Start: 2024-01-04 | End: 2024-01-04

## 2024-01-04 RX ORDER — ONDANSETRON 2 MG/ML
INJECTION INTRAMUSCULAR; INTRAVENOUS
Status: DISCONTINUED | OUTPATIENT
Start: 2024-01-04 | End: 2024-01-04

## 2024-01-04 RX ORDER — METOCLOPRAMIDE HYDROCHLORIDE 5 MG/ML
5 INJECTION INTRAMUSCULAR; INTRAVENOUS EVERY 6 HOURS PRN
Status: CANCELLED | OUTPATIENT
Start: 2024-01-04

## 2024-01-04 RX ORDER — LIDOCAINE HYDROCHLORIDE 20 MG/ML
INJECTION INTRAVENOUS
Status: DISCONTINUED | OUTPATIENT
Start: 2024-01-04 | End: 2024-01-04

## 2024-01-04 RX ORDER — FENTANYL CITRATE 50 UG/ML
INJECTION, SOLUTION INTRAMUSCULAR; INTRAVENOUS
Status: DISCONTINUED | OUTPATIENT
Start: 2024-01-04 | End: 2024-01-04

## 2024-01-04 RX ORDER — MUPIROCIN 20 MG/G
OINTMENT TOPICAL
Status: ACTIVE | OUTPATIENT
Start: 2024-01-04

## 2024-01-04 RX ORDER — DIAZEPAM 10 MG/2ML
5 INJECTION INTRAMUSCULAR ONCE
Status: COMPLETED | OUTPATIENT
Start: 2024-01-05 | End: 2024-01-04

## 2024-01-04 RX ORDER — DEXAMETHASONE SODIUM PHOSPHATE 4 MG/ML
4 INJECTION, SOLUTION INTRA-ARTICULAR; INTRALESIONAL; INTRAMUSCULAR; INTRAVENOUS; SOFT TISSUE EVERY 6 HOURS
Status: CANCELLED | OUTPATIENT
Start: 2024-01-04

## 2024-01-04 RX ORDER — DROPERIDOL 2.5 MG/ML
0.62 INJECTION, SOLUTION INTRAMUSCULAR; INTRAVENOUS ONCE AS NEEDED
Status: CANCELLED | OUTPATIENT
Start: 2024-01-04 | End: 2035-06-02

## 2024-01-04 RX ORDER — ACETAMINOPHEN 325 MG/1
650 TABLET ORAL EVERY 6 HOURS PRN
Status: DISCONTINUED | OUTPATIENT
Start: 2024-01-04 | End: 2024-01-04 | Stop reason: SDUPTHER

## 2024-01-04 RX ORDER — HYDRALAZINE HYDROCHLORIDE 20 MG/ML
INJECTION INTRAMUSCULAR; INTRAVENOUS
Status: COMPLETED
Start: 2024-01-04 | End: 2024-01-04

## 2024-01-04 RX ADMIN — ROCURONIUM BROMIDE 40 MG: 10 INJECTION INTRAVENOUS at 12:01

## 2024-01-04 RX ADMIN — ONDANSETRON 4 MG: 2 INJECTION INTRAMUSCULAR; INTRAVENOUS at 06:01

## 2024-01-04 RX ADMIN — CEFTRIAXONE 2 G: 1 INJECTION, POWDER, FOR SOLUTION INTRAMUSCULAR; INTRAVENOUS at 12:01

## 2024-01-04 RX ADMIN — MUPIROCIN 1 G: 20 OINTMENT TOPICAL at 07:01

## 2024-01-04 RX ADMIN — FENTANYL CITRATE 100 MCG: 50 INJECTION, SOLUTION INTRAMUSCULAR; INTRAVENOUS at 01:01

## 2024-01-04 RX ADMIN — LABETALOL HYDROCHLORIDE 10 MG: 5 INJECTION, SOLUTION INTRAVENOUS at 01:01

## 2024-01-04 RX ADMIN — SODIUM CHLORIDE: 0.9 INJECTION, SOLUTION INTRAVENOUS at 12:01

## 2024-01-04 RX ADMIN — PROCHLORPERAZINE EDISYLATE 5 MG: 5 INJECTION INTRAMUSCULAR; INTRAVENOUS at 08:01

## 2024-01-04 RX ADMIN — HYDRALAZINE HYDROCHLORIDE 10 MG: 20 INJECTION, SOLUTION INTRAMUSCULAR; INTRAVENOUS at 04:01

## 2024-01-04 RX ADMIN — DIAZEPAM 5 MG: 10 INJECTION, SOLUTION INTRAMUSCULAR; INTRAVENOUS at 11:01

## 2024-01-04 RX ADMIN — MUPIROCIN: 20 OINTMENT TOPICAL at 10:01

## 2024-01-04 RX ADMIN — PROPOFOL 200 MG: 10 INJECTION, EMULSION INTRAVENOUS at 12:01

## 2024-01-04 RX ADMIN — HYDRALAZINE HYDROCHLORIDE 10 MG: 20 INJECTION INTRAMUSCULAR; INTRAVENOUS at 04:01

## 2024-01-04 RX ADMIN — LABETALOL HYDROCHLORIDE 10 MG: 5 INJECTION, SOLUTION INTRAVENOUS at 04:01

## 2024-01-04 RX ADMIN — DEXAMETHASONE SODIUM PHOSPHATE 12 MG: 4 INJECTION, SOLUTION INTRAMUSCULAR; INTRAVENOUS at 01:01

## 2024-01-04 RX ADMIN — SUGAMMADEX 200 MG: 100 INJECTION, SOLUTION INTRAVENOUS at 03:01

## 2024-01-04 RX ADMIN — SODIUM CHLORIDE: 0.9 INJECTION, SOLUTION INTRAVENOUS at 02:01

## 2024-01-04 RX ADMIN — SUFENTANIL CITRATE 0.3 MCG/KG/HR: 50 INJECTION, SOLUTION EPIDURAL; INTRAVENOUS at 12:01

## 2024-01-04 RX ADMIN — MIDAZOLAM HYDROCHLORIDE 2 MG: 1 INJECTION, SOLUTION INTRAMUSCULAR; INTRAVENOUS at 12:01

## 2024-01-04 RX ADMIN — SODIUM CHLORIDE, SODIUM ACETATE ANHYDROUS, SODIUM GLUCONATE, POTASSIUM CHLORIDE, AND MAGNESIUM CHLORIDE: 526; 222; 502; 37; 30 INJECTION, SOLUTION INTRAVENOUS at 12:01

## 2024-01-04 RX ADMIN — ACETAMINOPHEN 650 MG: 325 TABLET ORAL at 05:01

## 2024-01-04 RX ADMIN — FENTANYL CITRATE 100 MCG: 50 INJECTION, SOLUTION INTRAMUSCULAR; INTRAVENOUS at 03:01

## 2024-01-04 RX ADMIN — FENTANYL CITRATE 100 MCG: 50 INJECTION, SOLUTION INTRAMUSCULAR; INTRAVENOUS at 12:01

## 2024-01-04 RX ADMIN — NICARDIPINE HYDROCHLORIDE 2.5 MG/HR: 0.2 INJECTION, SOLUTION INTRAVENOUS at 05:01

## 2024-01-04 RX ADMIN — LABETALOL HYDROCHLORIDE 10 MG: 5 INJECTION, SOLUTION INTRAVENOUS at 03:01

## 2024-01-04 RX ADMIN — PROPOFOL 125 MCG/KG/MIN: 10 INJECTION, EMULSION INTRAVENOUS at 12:01

## 2024-01-04 RX ADMIN — LIDOCAINE HYDROCHLORIDE 100 MG: 20 INJECTION INTRAVENOUS at 12:01

## 2024-01-04 RX ADMIN — ONDANSETRON 4 MG: 2 INJECTION INTRAMUSCULAR; INTRAVENOUS at 03:01

## 2024-01-04 RX ADMIN — DEXMEDETOMIDINE 16 MCG: 200 INJECTION, SOLUTION INTRAVENOUS at 03:01

## 2024-01-04 RX ADMIN — SUCCINYLCHOLINE CHLORIDE 120 MG: 20 INJECTION, SOLUTION INTRAMUSCULAR; INTRAVENOUS; PARENTERAL at 12:01

## 2024-01-04 NOTE — ANESTHESIA PROCEDURE NOTES
Arterial    Diagnosis: brain mass    Patient location during procedure: done in OR    Staffing  Authorizing Provider: Juanito Oneil MD  Performing Provider: Joann Rondon CRNA    Staffing  Performed by: Joann Rondon CRNA  Authorized by: Juanito Oneil MD    Anesthesiologist was present at the time of the procedure.  Arterial  Skin Prep: chlorhexidine gluconate  Location: radial    Catheter Size: 20 G  Catheter placement by Anatomical landmarks. Heme positive aspiration all ports. Insertion Attempts: 1

## 2024-01-04 NOTE — BRIEF OP NOTE
[facility] - Surgery ([facility])  Brief Operative Note    SUMMARY     Surgery Date: 1/4/2024     Surgeon(s) and Role:     * Luis Godinez MD - Primary     * Cesar Mayberry MD - Resident - Assisting        Pre-op Diagnosis:  Brain mass [G93.89]    Post-op Diagnosis:  Post-Op Diagnosis Codes:     * Brain mass [G93.89]    Procedure(s) (LRB):  CRANIOTOMY, SUBOCCIPITAL MIS (Right)    Anesthesia: General    Implants:  Implant Name Type Inv. Item Serial No.  Lot No. LRB No. Used Action   PLATE BONE BUR HOLE COVER 10MM - [id number]  PLATE BONE BUR HOLE COVER 10MM  [employer].  Right 2 Implanted   SCREW UN3 AXS SD 1.5X4MM - [id number]  SCREW UN3 AXS SD 1.5X4MM  [employer].  Right 9 Implanted       Operative Findings: MIS removal or right cerebellar tumor.    Estimated Blood Loss: * No values recorded between 1/4/2024  1:30 PM and 1/4/2024  3:33 PM *    Estimated Blood Loss has been documented.Minimal         Specimens:   Specimen (24h ago, onward)       Start     Ordered    01/04/24 1416  Specimen to Pathology, Surgery Neurosurgery  Once        Comments: Pre-op Diagnosis: Brain mass [G93.89]Procedure(s):CRANIOTOMY, SUBOCCIPITAL MIS Number of specimens: 2Name of specimens: 1. Brain tumor - Frozen (to pathology with ORA @ 1415)  2. Brain tumor - Permanent     References:    Click here for ordering Quick Tip   Question Answer Comment   Procedure Type: Neurosurgery    Which provider would you like to cc? LUIS GODINEZ    Release to patient Immediate        01/04/24 1434                    [id number]

## 2024-01-04 NOTE — SUBJECTIVE & OBJECTIVE
History reviewed. No pertinent past medical history.  History reviewed. No pertinent surgical history.   No current facility-administered medications on file prior to encounter.     Current Outpatient Medications on File Prior to Encounter   Medication Sig Dispense Refill    ondansetron (ZOFRAN) 4 MG tablet Take 8 mg by mouth 2 (two) times daily.        Allergies: Patient has no known allergies.  Family History   Problem Relation Age of Onset    Hypertension Mother     Hyperlipidemia Mother     Hypertension Father     Colon cancer Maternal Grandfather      Social History     Tobacco Use    Smoking status: Never   Substance Use Topics    Alcohol use: Yes     Review of Systems  Review of Symptoms:  Constitutional: Denies fevers, weight loss, chills, or weakness.  Eyes: Denies changes in vision.  ENT: Denies dysphagia, nasal discharge, ear pain or discharge.  Cardiovascular: Denies chest pain, palpitations, orthopnea, or claudication.  Respiratory: Denies shortness of breath, cough, hemoptysis, or wheezing.  GI: Denies nausea/vomitting, hematochezia, melena, abd pain, or changes in appetite.  : Denies dysuria, incontinence, or hematuria.  Musculoskeletal: Denies joint pain or myalgias.  Skin/breast: Denies rashes, lumps, lesions, or discharge. + headache  Neurologic: Denies  dizziness, vertigo, or paresthesias.  Psychiatric: Denies changes in mood or hallucinations.  Endocrine: Denies polyuria, polydipsia, heat/cold intolerance.  Hematologic/Lymph: Denies lymphadenopathy, easy bruising or easy bleeding.  Allergic/Immunologic: Denies rash, rhinitis.   Objective:     Vitals:    Temp: 98.3 °F (36.8 °C)  Pulse: 85  BP: (!) 146/82  MAP (mmHg): 107  Resp: 20  SpO2: (!) 94 %    Temp  Min: 98.3 °F (36.8 °C)  Max: 98.3 °F (36.8 °C)  Pulse  Min: 74  Max: 85  BP  Min: 144/85  Max: 146/82  MAP (mmHg)  Min: 107  Max: 115  Resp  Min: 20  Max: 53  SpO2  Min: 93 %  Max: 97 %    No intake/output data recorded.            Physical  Exam  GA: Alert, comfortable, no acute distress.   HEENT: No scleral icterus or JVD.   Pulmonary: Clear to auscultation A/P/L. No wheezing, crackles, or rhonchi.  Cardiac: RRR S1 & S2 w/o rubs/murmurs/gallops.   Abdominal: Bowel sounds present x 4. No appreciable hepatosplenomegaly.  Skin: No jaundice, rashes, or visible lesions.  Neuro:  --GCS: E3 V3 M6  --Mental Status:  opens eyes to voice, oriented X3 disoriented to year, follows comamnds, fluent speech  --CN II-XII grossly intact.   --Pupils 3mm, PERRL.   --Corneal reflex, gag, cough intact.  --BIRD spont and against gravity 5/5       Today I personally reviewed pertinent medications, lines/drains/airways, imaging, cardiology results, laboratory results, microbiology results,

## 2024-01-04 NOTE — ANESTHESIA PROCEDURE NOTES
Intubation    Date/Time: 1/4/2024 12:43 PM    Performed by: Joann Rondon CRNA  Authorized by: Juanito Oneil MD    Intubation:     Induction:  Rapid sequence induction    Intubated:  Postinduction    Mask Ventilation:  Not attempted (RSI)    Attempts:  1    Attempted By:  Student    Method of Intubation:  Video laryngoscopy    Blade:  [place] 3    Laryngeal View Grade: Grade I - full view of cords      Difficult Airway Encountered?: No      Complications:  Reflux of gastric contents - no apparent aspiration    Airway Device:  Oral endotracheal tube    Airway Device Size:  7.5    Style/Cuff Inflation:  Cuffed    Tube secured:  23    Secured at:  The lips    Placement Verified By:  Capnometry    DIFFICULT INTUBATION DESCRIPTOR: copious secretions, possible gastric content.    Findings Post-Intubation:  BS equal bilateral

## 2024-01-04 NOTE — TRANSFER OF CARE
"Anesthesia Transfer of Care Note    Patient: Sukumar Wiseman    Procedure(s) Performed: Procedure(s) (LRB):  CRANIOTOMY, SUBOCCIPITAL MIS (Right)    Patient location: ICU    Anesthesia Type: general    Transport from OR: Transported from OR on 6-10 L/min O2 by face mask with adequate spontaneous ventilation    Post pain: adequate analgesia    Post assessment: no apparent anesthetic complications    Post vital signs: stable    Level of consciousness: awake    Nausea/Vomiting: no nausea/vomiting    Complications: none    Transfer of care protocol was followed      Last vitals: Visit Vitals  BP (!) 145/95 (BP Location: Left arm, Patient Position: Lying)   Pulse 77   Temp 36.8 °C (98.3 °F) (Skin)   Resp 20   Ht 5' 10" (1.778 m)   Wt 88.5 kg (195 lb)   SpO2 97%   BMI 27.98 kg/m²     "

## 2024-01-04 NOTE — NURSING
Patient arrived to [facility] from OR (name of hospital or home) by (mode of transportation & company name)    Report received from: CRNA,      Type of stroke/diagnosis:  Brain Mass    Current symptoms: Gait and balance difficulties before surgery    Skin Assessment done: Y  Wounds noted: none    Skin Assessment Verified by:  Alee Marks Completed? Pending    Patient Belongings on Admit: (be specific) none    NCC notified: name of person notified Nicole Navarro NP

## 2024-01-04 NOTE — OP NOTE
DATE OF PROCEDURE:  1/4/2024     SURGEON:  Luis aWllace M.D., Ph.D.     ASSISTANT FOR THIS SURGERY:  Cesar Mayberry M.D.  (RES) (the assistant is a [employer] resident).     PREOPERATIVE DIAGNOSES:  Brain mass.  Imbalance.    POSTOPERATIVE DIAGNOSES:  Brain mass.  Imbalance.       PROCEDURES PERFORMED:  1.  Minimally invasive right suboccipital craniotomy for tumor.  2.  Stealth navigation.  3.  Microsurgical technique.     INDICATIONS IN DETAIL:    Mr. Sukumar Wiseman this is a very pleasant 38-year-old man who presents for surgery today..  Patient reports he began having episodes of imbalance more than a month ago.  He reports that he has not had any falls but feels as if he might.  The patient reports that he has not had any other symptoms.  He does report he has had intermittent headaches but believe that those are secondary to stress.  The patient was seen with his wife who also added to the history.  They both report that he has been otherwise healthy and has no significant medical problems.  The patient was seen by his primary care physician who ordered an MRI that showed a tumor involving his right cerebellar peduncle.MRI Brain (12/22)  Intra-axial mass centered about the right middle cerebellar peduncle with involvement of the right lateral aspect of the 4th ventricle and extending into the right cerebellopontine angle cistern.The mass measures approximately 2.4 x 2.4 cm in the axial plane and 2.3 cm in craniocaudal dimension.  No surrounding vasogenic edema.  No other masses.  No ventricular dilatation.  I have recommended performing a minimally invasive transcerebellar approach to this tumor.  They wished to proceed.      PROCEDURE IN DETAIL:    The patient was seen in the pretreatment area and the risks, benefits and alternatives were again discussed and the patient wished to proceed.  The patient was brought to the Operating Room and a general anesthetic was administered.  All proper  lines were placed.  The patient was placed in the supine position with a large bolster underneath the right side.  The head was placed in 3-point fixation using Lavallette headholder.  Head was turned towards the right in the neck was flexed.  The head palma was attached bed in this orientation.  The Stealth navigation system was brought to the field and an accurate registration was achieved using the tracer function.  The transverse and sigmoid sinuses were marked.  The tumor was marked as the target.  A curved incision was planned to allow us access.  The patient's hair was clipped in this region and the patient's head was cleaned, prepped and draped in the usual fashion.  A lidocaine-bupivacaine mix was infiltrated under the skin.  An incision was made using a #10 blade and carried down to the calvarium using Bovie cautery.  Then, the soft tissue was detached from the skull using a periosteal elevator and held in retraction using fishhooks and a Marcie bar. Two marco holes were made, one marco hole was made 1 was made closest to the junction of the transverse and sigmoid sinuses.  The other was made laterally.  Once this was complete, using a high-speed [employer] drill with a craniotome attachment, we turned a small craniotomy flap.  Once this was complete, we verified our location again with stealth navigation and the dura was opened with the flap facing downward.  We made a corticectomy measuring 25 mm and  placed a 21 mm Vycor tube through the substance of the brain down to our target.  We verified our location using stealth navigation.  Then under the microscope we were able to see abnormalities.  As we 1st entered a normal tissue we sent some tissue for intraoperative evaluation.  This came back as necrosis with normal brain.  Then using suction and bipolar cautery we deepened our exploration.  We were able to see much more abnormal tissue deeper.  Of we took a number of samples for permanent evaluation.  We were  able to see as we dissected the junction between normal and abnormal brain.  There were few large blood vessels feeding this area.  These were cauterized.  We used microsurgical technique to separate the tumor from the normal tissue.  As we deepened our resection we entered an area where we could see with the claudia into the 4th ventricle.  We could also see abnormal tissue going off to the right towards the midline of the 4th ventricle.  This was removed up to the very edge of our exposure.  The wound was then irrigated copiously and all bleeding points were coagulated.  We lined the resection cavity with Surgicel and filled it with Vista Seal as we slowly retracted the retractor.  Once this was complete, the wound was irrigated copiously. The dura was closed using 4-0 Nurolon sutures in the usual fashion.  A dry piece of Gelfoam was placed over the dural closure.  The bone flap was replaced using Lamont plates and screws.  Once this was complete, the soft tissues  were closed in layers with staples in the skin.  A clean dressing was placed. The patient was awakened by the Anesthesia staff.  At the point the patient was awake and following commands, he was extubated.     Specimen included tumor taken for intraoperative and permanent evaluation.     EBL was approximately 50 mL.     There were no intraprocedural complications.     All counts were correct at the end of surgery.     Dr. Luis Wallace was present during the entire procedure.     The patient was then transported to the postanesthesia care unit as no ICU beds were available at the time.  Members of the surgical team were involved in the transport.

## 2024-01-04 NOTE — HPI
Mr. Sukumar Wiseman this is a 38-year-old M with no PMHx admitted to [facility] s/p  Minimally invasive right suboccipital craniotomy for tumor. Per chart review, patient reports he began having episodes of imbalance more than a month ago.  He reports that he has not had any falls but feels as if he might.  The patient reports that he has not had any other symptoms.  He does report he has had intermittent headaches but believe that those are secondary to stress.  The patient was seen with his wife who also added to the history.  They both report that he has been otherwise healthy and has no significant medical problems.  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 Pending post op imaging. Patient admitted to [facility] for close monitoring and higher level of care.

## 2024-01-04 NOTE — ASSESSMENT & PLAN NOTE
Mr. Sukumar Wiseman this is a 37 y/o M w/ no significant PMHx admitted to [facility] s/p R suboccipital crani for tumor resection     Pending post op imaging per NSGY  SBP <160  Cardene gtt  Neuro checks q 1 ht  PT/OT/SLP

## 2024-01-04 NOTE — ANESTHESIA PREPROCEDURE EVALUATION
01/04/2024  Sukumar Wiseman is a 38 y.o., male.    Pre-operative evaluation for Procedure(s) (LRB):  CRANIOTOMY, SUBOCCIPITAL MIS (Right)    Sukumar Wiseman is a 38 y.o. male     Patient Active Problem List   Diagnosis    Fatigue    Elevated BP without diagnosis of hypertension    KIM (generalized anxiety disorder)    Ataxia       Review of patient's allergies indicates:  No Known Allergies    No current facility-administered medications on file prior to encounter.     Current Outpatient Medications on File Prior to Encounter   Medication Sig Dispense Refill    ondansetron (ZOFRAN) 4 MG tablet Take 8 mg by mouth 2 (two) times daily.         History reviewed. No pertinent surgical history.    Social History     Socioeconomic History    Marital status:    Tobacco Use    Smoking status: Never   Substance and Sexual Activity    Alcohol use: Yes     Social Determinants of Health     Financial Resource Strain: Low Risk  (12/25/2023)    Overall Financial Resource Strain (CARDIA)     Difficulty of Paying Living Expenses: Not hard at all   Food Insecurity: No Food Insecurity (12/25/2023)    Hunger Vital Sign     Worried About Running Out of Food in the Last Year: Never true     Ran Out of Food in the Last Year: Never true   Transportation Needs: No Transportation Needs (12/25/2023)    PRAPARE - Transportation     Lack of Transportation (Medical): No     Lack of Transportation (Non-Medical): No   Physical Activity: Insufficiently Active (12/25/2023)    Exercise Vital Sign     Days of Exercise per Week: 3 days     Minutes of Exercise per Session: 30 min   Stress: Stress Concern Present (12/25/2023)    [employer] [place] [employer] - Occupational Stress Questionnaire     Feeling of Stress : Very much   Social Connections: Unknown (12/25/2023)    Social Connection and Isolation Panel [NHANES]     Frequency  "of Communication with Friends and Family: More than three times a week     Frequency of Social Gatherings with Friends and Family: Once a week     Active Member of Clubs or Organizations: No     Attends Club or Organization Meetings: Patient declined     Marital Status:    Housing Stability: Low Risk  (2023)    Housing Stability Vital Sign     Unable to Pay for Housing in the Last Year: No     Number of Places Lived in the Last Year: 1     Unstable Housing in the Last Year: No         CBC: No results for input(s): "WBC", "RBC", "HGB", "HCT", "PLT", "MCV", "MCH", "MCHC" in the last 72 hours.    CMP: No results for input(s): "NA", "K", "CL", "CO2", "BUN", "CREATININE", "GLU", "MG", "PHOS", "CALCIUM", "ALBUMIN", "PROT", "ALKPHOS", "ALT", "AST", "BILITOT" in the last 72 hours.    INR  No results for input(s): "PT", "INR", "PROTIME", "APTT" in the last 72 hours.        Diagnostic Studies:      EKD Echo:  No results found for this or any previous visit.       Pre-op Assessment    I have reviewed the Patient Summary Reports.     I have reviewed the Nursing Notes. I have reviewed the NPO Status.   I have reviewed the Medications.     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                              Psych:  Psychiatric History anxiety                 Physical Exam  General: Well nourished, Cooperative and Alert    Airway:  Mallampati: I   Mouth Opening: Normal  TM Distance: Normal  Tongue: Normal  Neck ROM: Normal ROM    Dental:  Intact    Chest/Lungs:  Normal Respiratory Rate    Heart:  Rate: Normal        Anesthesia " Plan  Type of Anesthesia, risks & benefits discussed:    Anesthesia Type: Gen ETT  Intra-op Monitoring Plan: Standard ASA Monitors  Post Op Pain Control Plan: multimodal analgesia and IV/PO Opioids PRN  Induction:  IV and rapid sequence  Airway Plan: Direct, Post-Induction  Informed Consent: Informed consent signed with the Patient and all parties understand the risks and agree with anesthesia plan.  All questions answered.   ASA Score: 2  Day of Surgery Review of History & Physical: H&P Update referred to the surgeon/provider.    Ready For Surgery From Anesthesia Perspective.     .

## 2024-01-05 PROBLEM — R11.0 NAUSEA: Status: ACTIVE | Noted: 2024-01-05

## 2024-01-05 PROBLEM — D72.829 LEUKOCYTOSIS: Status: ACTIVE | Noted: 2024-01-05

## 2024-01-05 LAB
ALBUMIN SERPL BCP-MCNC: 3.7 G/DL (ref 3.5–5.2)
ALP SERPL-CCNC: 61 U/L (ref 55–135)
ALT SERPL W/O P-5'-P-CCNC: 18 U/L (ref 10–44)
ANION GAP SERPL CALC-SCNC: 13 MMOL/L (ref 8–16)
AST SERPL-CCNC: 11 U/L (ref 10–40)
BASOPHILS # BLD AUTO: ABNORMAL K/UL (ref 0–0.2)
BASOPHILS NFR BLD: 0 % (ref 0–1.9)
BILIRUB SERPL-MCNC: 1.7 MG/DL (ref 0.1–1)
BUN SERPL-MCNC: 12 MG/DL (ref 6–20)
CALCIUM SERPL-MCNC: 8.7 MG/DL (ref 8.7–10.5)
CHLORIDE SERPL-SCNC: 107 MMOL/L (ref 95–110)
CO2 SERPL-SCNC: 17 MMOL/L (ref 23–29)
CREAT SERPL-MCNC: 0.8 MG/DL (ref 0.5–1.4)
DIFFERENTIAL METHOD BLD: ABNORMAL
EOSINOPHIL # BLD AUTO: ABNORMAL K/UL (ref 0–0.5)
EOSINOPHIL NFR BLD: 0 % (ref 0–8)
ERYTHROCYTE [DISTWIDTH] IN BLOOD BY AUTOMATED COUNT: 11.7 % (ref 11.5–14.5)
EST. GFR  (NO RACE VARIABLE): >60 ML/MIN/1.73 M^2
GLUCOSE SERPL-MCNC: 220 MG/DL (ref 70–110)
HCT VFR BLD AUTO: 44.5 % (ref 40–54)
HGB BLD-MCNC: 15.1 G/DL (ref 14–18)
IMM GRANULOCYTES # BLD AUTO: ABNORMAL K/UL (ref 0–0.04)
IMM GRANULOCYTES NFR BLD AUTO: ABNORMAL % (ref 0–0.5)
LYMPHOCYTES # BLD AUTO: ABNORMAL K/UL (ref 1–4.8)
LYMPHOCYTES NFR BLD: 1 % (ref 18–48)
MAGNESIUM SERPL-MCNC: 1.7 MG/DL (ref 1.6–2.6)
MCH RBC QN AUTO: 29.8 PG (ref 27–31)
MCHC RBC AUTO-ENTMCNC: 33.9 G/DL (ref 32–36)
MCV RBC AUTO: 88 FL (ref 82–98)
METAMYELOCYTES NFR BLD MANUAL: 1 %
MONOCYTES # BLD AUTO: ABNORMAL K/UL (ref 0.3–1)
MONOCYTES NFR BLD: 2 % (ref 4–15)
NEUTROPHILS NFR BLD: 92 % (ref 38–73)
NEUTS BAND NFR BLD MANUAL: 4 %
NRBC BLD-RTO: 0 /100 WBC
PHOSPHATE SERPL-MCNC: 2.5 MG/DL (ref 2.7–4.5)
PLATELET # BLD AUTO: 284 K/UL (ref 150–450)
PLATELET BLD QL SMEAR: ABNORMAL
PMV BLD AUTO: 8.8 FL (ref 9.2–12.9)
POCT GLUCOSE: 134 MG/DL (ref 70–110)
POCT GLUCOSE: 146 MG/DL (ref 70–110)
POCT GLUCOSE: 176 MG/DL (ref 70–110)
POTASSIUM SERPL-SCNC: 4 MMOL/L (ref 3.5–5.1)
PROT SERPL-MCNC: 6.8 G/DL (ref 6–8.4)
RBC # BLD AUTO: 5.07 M/UL (ref 4.6–6.2)
SODIUM SERPL-SCNC: 137 MMOL/L (ref 136–145)
WBC # BLD AUTO: 29.53 K/UL (ref 3.9–12.7)

## 2024-01-05 PROCEDURE — 63600175 PHARM REV CODE 636 W HCPCS

## 2024-01-05 PROCEDURE — 25000003 PHARM REV CODE 250: Performed by: NURSE PRACTITIONER

## 2024-01-05 PROCEDURE — 94761 N-INVAS EAR/PLS OXIMETRY MLT: CPT

## 2024-01-05 PROCEDURE — 80053 COMPREHEN METABOLIC PANEL: CPT | Performed by: NURSE PRACTITIONER

## 2024-01-05 PROCEDURE — 25000003 PHARM REV CODE 250

## 2024-01-05 PROCEDURE — 97162 PT EVAL MOD COMPLEX 30 MIN: CPT

## 2024-01-05 PROCEDURE — 84100 ASSAY OF PHOSPHORUS: CPT | Performed by: NURSE PRACTITIONER

## 2024-01-05 PROCEDURE — 85027 COMPLETE CBC AUTOMATED: CPT | Performed by: NURSE PRACTITIONER

## 2024-01-05 PROCEDURE — 97530 THERAPEUTIC ACTIVITIES: CPT

## 2024-01-05 PROCEDURE — 97116 GAIT TRAINING THERAPY: CPT

## 2024-01-05 PROCEDURE — 20600001 HC STEP DOWN PRIVATE ROOM

## 2024-01-05 PROCEDURE — 85007 BL SMEAR W/DIFF WBC COUNT: CPT | Performed by: NURSE PRACTITIONER

## 2024-01-05 PROCEDURE — 99233 SBSQ HOSP IP/OBS HIGH 50: CPT | Mod: ,,, | Performed by: PSYCHIATRY & NEUROLOGY

## 2024-01-05 PROCEDURE — 97165 OT EVAL LOW COMPLEX 30 MIN: CPT

## 2024-01-05 PROCEDURE — 25500020 PHARM REV CODE 255: Performed by: INTERNAL MEDICINE

## 2024-01-05 PROCEDURE — 83735 ASSAY OF MAGNESIUM: CPT | Performed by: NURSE PRACTITIONER

## 2024-01-05 PROCEDURE — 63600175 PHARM REV CODE 636 W HCPCS: Performed by: NURSE PRACTITIONER

## 2024-01-05 PROCEDURE — A9585 GADOBUTROL INJECTION: HCPCS | Performed by: INTERNAL MEDICINE

## 2024-01-05 RX ORDER — POTASSIUM CHLORIDE 7.45 MG/ML
40 INJECTION INTRAVENOUS
Status: DISCONTINUED | OUTPATIENT
Start: 2024-01-05 | End: 2024-01-07 | Stop reason: HOSPADM

## 2024-01-05 RX ORDER — GLUCAGON 1 MG
1 KIT INJECTION
Status: DISCONTINUED | OUTPATIENT
Start: 2024-01-05 | End: 2024-01-07 | Stop reason: HOSPADM

## 2024-01-05 RX ORDER — POTASSIUM CHLORIDE 7.45 MG/ML
60 INJECTION INTRAVENOUS
Status: DISCONTINUED | OUTPATIENT
Start: 2024-01-05 | End: 2024-01-07 | Stop reason: HOSPADM

## 2024-01-05 RX ORDER — MAGNESIUM SULFATE HEPTAHYDRATE 40 MG/ML
4 INJECTION, SOLUTION INTRAVENOUS
Status: DISCONTINUED | OUTPATIENT
Start: 2024-01-05 | End: 2024-01-07 | Stop reason: HOSPADM

## 2024-01-05 RX ORDER — CALCIUM GLUCONATE 20 MG/ML
2 INJECTION, SOLUTION INTRAVENOUS
Status: DISCONTINUED | OUTPATIENT
Start: 2024-01-05 | End: 2024-01-07 | Stop reason: HOSPADM

## 2024-01-05 RX ORDER — MAGNESIUM SULFATE HEPTAHYDRATE 40 MG/ML
2 INJECTION, SOLUTION INTRAVENOUS
Status: DISCONTINUED | OUTPATIENT
Start: 2024-01-05 | End: 2024-01-07 | Stop reason: HOSPADM

## 2024-01-05 RX ORDER — GADOBUTROL 604.72 MG/ML
10 INJECTION INTRAVENOUS
Status: COMPLETED | OUTPATIENT
Start: 2024-01-05 | End: 2024-01-05

## 2024-01-05 RX ORDER — HEPARIN SODIUM 5000 [USP'U]/ML
5000 INJECTION, SOLUTION INTRAVENOUS; SUBCUTANEOUS EVERY 8 HOURS
Status: DISCONTINUED | OUTPATIENT
Start: 2024-01-06 | End: 2024-01-07 | Stop reason: HOSPADM

## 2024-01-05 RX ORDER — CALCIUM GLUCONATE 20 MG/ML
1 INJECTION, SOLUTION INTRAVENOUS
Status: DISCONTINUED | OUTPATIENT
Start: 2024-01-05 | End: 2024-01-07 | Stop reason: HOSPADM

## 2024-01-05 RX ORDER — CALCIUM GLUCONATE 20 MG/ML
3 INJECTION, SOLUTION INTRAVENOUS
Status: DISCONTINUED | OUTPATIENT
Start: 2024-01-05 | End: 2024-01-07 | Stop reason: HOSPADM

## 2024-01-05 RX ORDER — POTASSIUM CHLORIDE 7.45 MG/ML
80 INJECTION INTRAVENOUS
Status: DISCONTINUED | OUTPATIENT
Start: 2024-01-05 | End: 2024-01-07 | Stop reason: HOSPADM

## 2024-01-05 RX ORDER — DIAZEPAM 5 MG/1
5 TABLET ORAL EVERY 8 HOURS PRN
Status: DISCONTINUED | OUTPATIENT
Start: 2024-01-05 | End: 2024-01-06

## 2024-01-05 RX ORDER — DEXAMETHASONE 4 MG/1
4 TABLET ORAL EVERY 6 HOURS
Status: DISCONTINUED | OUTPATIENT
Start: 2024-01-05 | End: 2024-01-06

## 2024-01-05 RX ORDER — INSULIN ASPART 100 [IU]/ML
0-10 INJECTION, SOLUTION INTRAVENOUS; SUBCUTANEOUS EVERY 6 HOURS PRN
Status: DISCONTINUED | OUTPATIENT
Start: 2024-01-05 | End: 2024-01-07 | Stop reason: HOSPADM

## 2024-01-05 RX ADMIN — SODIUM PHOSPHATE, MONOBASIC, MONOHYDRATE AND SODIUM PHOSPHATE, DIBASIC, ANHYDROUS 15 MMOL: 142; 276 INJECTION, SOLUTION INTRAVENOUS at 06:01

## 2024-01-05 RX ADMIN — GADOBUTROL 10 ML: 604.72 INJECTION INTRAVENOUS at 12:01

## 2024-01-05 RX ADMIN — DIAZEPAM 5 MG: 5 TABLET ORAL at 12:01

## 2024-01-05 RX ADMIN — PROCHLORPERAZINE EDISYLATE 5 MG: 5 INJECTION INTRAMUSCULAR; INTRAVENOUS at 03:01

## 2024-01-05 RX ADMIN — MAGNESIUM SULFATE HEPTAHYDRATE 2 G: 40 INJECTION, SOLUTION INTRAVENOUS at 06:01

## 2024-01-05 RX ADMIN — DEXAMETHASONE 4 MG: 4 TABLET ORAL at 12:01

## 2024-01-05 RX ADMIN — ONDANSETRON 4 MG: 2 INJECTION INTRAMUSCULAR; INTRAVENOUS at 02:01

## 2024-01-05 RX ADMIN — PROCHLORPERAZINE EDISYLATE 5 MG: 5 INJECTION INTRAMUSCULAR; INTRAVENOUS at 06:01

## 2024-01-05 RX ADMIN — ACETAMINOPHEN 650 MG: 325 TABLET ORAL at 02:01

## 2024-01-05 RX ADMIN — ONDANSETRON 4 MG: 2 INJECTION INTRAMUSCULAR; INTRAVENOUS at 08:01

## 2024-01-05 RX ADMIN — ACETAMINOPHEN 650 MG: 325 TABLET ORAL at 08:01

## 2024-01-05 RX ADMIN — DEXAMETHASONE 4 MG: 4 TABLET ORAL at 05:01

## 2024-01-05 RX ADMIN — PROCHLORPERAZINE EDISYLATE 5 MG: 5 INJECTION INTRAMUSCULAR; INTRAVENOUS at 09:01

## 2024-01-05 RX ADMIN — ACETAMINOPHEN 650 MG: 325 TABLET ORAL at 09:01

## 2024-01-05 NOTE — SUBJECTIVE & OBJECTIVE
Interval History:  Please see hospital course above for full details     Review of Systems   Eyes:  Positive for photophobia and visual disturbance.   Respiratory: Negative.     Cardiovascular: Negative.    Gastrointestinal:  Positive for nausea. Negative for abdominal pain and vomiting.   Musculoskeletal:  Positive for gait problem and neck pain.   Neurological:  Positive for headaches. Negative for tremors, seizures, syncope, facial asymmetry, speech difficulty and weakness.   Psychiatric/Behavioral:  Positive for sleep disturbance.      Objective:     Vitals:  Temp: 97.9 °F (36.6 °C)  Pulse: 104  Rhythm: sinus tachycardia  BP: (!) 143/88  MAP (mmHg): 107  Resp: 20  SpO2: 99 %    Temp  Min: 96.4 °F (35.8 °C)  Max: 98.4 °F (36.9 °C)  Pulse  Min: 74  Max: 127  BP  Min: 116/83  Max: 161/90  MAP (mmHg)  Min: 94  Max: 113  Resp  Min: 17  Max: 53  SpO2  Min: 93 %  Max: 100 %    01/04 0701 - 01/05 0700  In: 1670.3 [I.V.:620.3]  Out: 1945 [Urine:1845]            Physical Exam  General Appearance: Young gentleman resting in bed, appears uncomfortable, not in acute hemodynamic distress  Mental Status Exam: awake, alert, aware, oriented, no aphasia, no dysarthria  Cranial Nerves: Gaze midline, nystagmus w/ R gaze, EOM intact, visual fields intact to finger counting, no facial asymmetry, no dysarthria   Motor: no drift in the UE/LE. Power is 5/5 in both UE/LE. Normal tone.  Sensory: Symmetric to LT in all 4 limbs without extinction  Coordination: FTN without dysmetria, fine motor regular and fast  Vascular: S1/S2 of normal intensity, no S3/S4 appreciated, no murmurs appreciated  Lungs: CTA bilaterally without wheezing  Abdomen: Soft, non-distended, non-tender, BS +         Medications:  Continuous ScheduleddexAMETHasone, 4 mg, Q6H  [START ON 1/6/2024] heparin (porcine), 5,000 Units, Q8H  senna-docusate 8.6-50 mg, 1 tablet, Daily    PRNacetaminophen, 650 mg, Q6H PRN  calcium gluconate IVPB, 1 g, PRN  calcium gluconate IVPB, 2  g, PRN  calcium gluconate IVPB, 3 g, PRN  dextrose 10%, 12.5 g, PRN  dextrose 10%, 25 g, PRN  diazePAM, 5 mg, Q8H PRN  glucagon (human recombinant), 1 mg, PRN  hydrALAZINE, 10 mg, Q4H PRN  insulin aspart U-100, 0-10 Units, Q6H PRN  labetalol, 10 mg, Q4H PRN  magnesium sulfate IVPB, 2 g, PRN  magnesium sulfate IVPB, 4 g, PRN  mupirocin, , On Call Procedure  ondansetron, 4 mg, Q6H PRN  oxyCODONE, 5 mg, Q6H PRN  potassium chloride, 40 mEq, PRN   And  potassium chloride, 60 mEq, PRN   And  potassium chloride, 80 mEq, PRN  prochlorperazine, 5 mg, Q6H PRN  sodium phosphate 15 mmol in dextrose 5 % (D5W) 250 mL IVPB, 15 mmol, PRN      Today I personally reviewed pertinent imaging, laboratory results, notably: MRI brain w/ adequate surgical effects, unchanged mass effect. Labs w/ reactive leukocytosis to 19, otherwise unremarkable     Diet  Diet Adult Regular (IDDSI Level 7) Thin; Standard Tray  Diet Adult Regular (IDDSI Level 7) Thin; Standard Tray

## 2024-01-05 NOTE — PLAN OF CARE
"[facility] Care Plan    POC reviewed with Sukumar Wiseman and family at 0300. Pt verbalized understanding. Questions and concerns addressed. No acute events overnight. Pt progressing toward goals. Will continue to monitor. See below and flowsheets for full assessment and VS info.     -MRI completed.  -Pt. experienced continuous nausea overnight. Provider notified; orders received.   -Valium 1 mg given once for nausea / anxiety pre-MRI  -PRN zofran given x2  -PRN compazine given x1  -Pt. stated relief from nausea @ approx. 0300  -[place] removed  -U.O. 1.1 L      Is this a stroke patient? no    Neuro:  [place] Coma Scale  Best Eye Response: 4-->(E4) spontaneous  Best Motor Response: 6-->(M6) obeys commands  Best Verbal Response: 5-->(V5) oriented  [place]aya Coma Scale Score: 15  Pupil PERRLA: yes     24hr Temp:  [96.4 °F (35.8 °C)-98.4 °F (36.9 °C)]     CV:   Rhythm: normal sinus rhythm  BP goals:   SBP < 160  MAP > 65    Resp:           Plan: N/A    GI/:     Diet/Nutrition Received: NPO  Last Bowel Movement: 01/04/24  Voiding Characteristics: urethral catheter (bladder)    Intake/Output Summary (Last 24 hours) at 1/5/2024 0507  Last data filed at 1/5/2024 0501  Gross per 24 hour   Intake 1670.33 ml   Output 1870 ml   Net -199.67 ml          Labs/Accuchecks:  Recent Labs   Lab 01/05/24  0213   WBC 29.53*   RBC 5.07   HGB 15.1   HCT 44.5         Recent Labs   Lab 01/05/24  0213      K 4.0   CO2 17*      BUN 12   CREATININE 0.8   ALKPHOS 61   ALT 18   AST 11   BILITOT 1.7*      Recent Labs   Lab 01/04/24  1050   INR 1.0   APTT 27.5    No results for input(s): "CPK", "CPKMB", "TROPONINI", "MB" in the last 168 hours.    Electrolytes: No replacement orders  Accuchecks: none    Gtts:   nicardipine 5 mg/hr (01/04/24 1801)       LDA/Wounds:  Lines/Drains/Airways       Drain  Duration                  Urethral Catheter 01/04/24 1259 Non-latex 16 Fr. <1 day              Arterial Line  Duration             Arterial " Line 01/04/24 1250 Left Radial <1 day              Peripheral Intravenous Line  Duration                  Peripheral IV - Single Lumen 01/04/24 1052 18 G Left Forearm <1 day         Peripheral IV - Single Lumen 01/04/24 1255 18 G Right Forearm <1 day                  Wounds: No  Wound care consulted: No

## 2024-01-05 NOTE — PLAN OF CARE
Goals remain appropriate.  Problem: Occupational Therapy  Goal: Occupational Therapy Goal  Description: Goals set 1/5 to be addressed for 14 days with expiration date, 1/19:  Patient will increase functional independence with ADLs by performing:  Patient will demonstrate stand pivot transfers with modified independence.   Not met  Patient will demonstrate grooming while standing with modified independence.   Not met  Patient will demonstrate upper body dressing with modified independence while seated EOB.   Not met  Patient will demonstrate lower body dressing with modified independence while seated EOB.   Not met  Patient will demonstrate toileting with modified independence.   Not met  Patient will demonstrate bathing while seated EOB with modified independence.   Not met  Patient's family / caregiver will demonstrate independence and safety with assisting patient with self-care skills and functional mobility.     Not met  Patient's family / caregiver will demonstrate independence with providing ROM and changes in bed positioning.   Not met    Outcome: Ongoing, Progressing

## 2024-01-05 NOTE — ASSESSMENT & PLAN NOTE
37yo with hx of occasional balance disturbance presented for removal of right cerebellar mass.    Recommendations:  -D/c Charlee  -continue dex 4q6  -TTF today  -PT/OT  -BP<160    Discussed with Dr. Wallace

## 2024-01-05 NOTE — SUBJECTIVE & OBJECTIVE
Interval History: NAEON. AFVSS. Pt has been nauseous which is to be expected. Will keep on dex and ttf today.    Medications:  Continuous Infusions:   nicardipine 5 mg/hr (01/04/24 1801)     Scheduled Meds:   senna-docusate 8.6-50 mg  1 tablet Oral Daily     PRN Meds:acetaminophen, calcium gluconate IVPB, calcium gluconate IVPB, calcium gluconate IVPB, dextrose 10%, dextrose 10%, glucagon (human recombinant), hydrALAZINE, insulin aspart U-100, labetalol, magnesium sulfate IVPB, magnesium sulfate IVPB, mupirocin, ondansetron, oxyCODONE, potassium chloride **AND** potassium chloride **AND** potassium chloride, prochlorperazine, sodium phosphate 15 mmol in dextrose 5 % (D5W) 250 mL IVPB     Review of Systems  Objective:     Weight: 88.6 kg (195 lb 5.2 oz)  Body mass index is 28.03 kg/m².  Vital Signs (Most Recent):  Temp: 98 °F (36.7 °C) (01/05/24 0705)  Pulse: 100 (01/05/24 0900)  Resp: 18 (01/05/24 0900)  BP: (!) 149/84 (01/05/24 0900)  SpO2: 98 % (01/05/24 0900) Vital Signs (24h Range):  Temp:  [96.4 °F (35.8 °C)-98.4 °F (36.9 °C)] 98 °F (36.7 °C)  Pulse:  [] 100  Resp:  [17-53] 18  SpO2:  [93 %-100 %] 98 %  BP: (116-158)/(65-99) 149/84  Arterial Line BP: (104-174)/(59-89) 137/86     Date 01/05/24 0700 - 01/06/24 0659   Shift 9371-3726 0227-3278 2437-1994 24 Hour Total   INTAKE   I.V.(mL/kg) 47.1(0.5)   47.1(0.5)   IV Piggyback 185.2   185.2   Shift Total(mL/kg) 232.3(2.6)   232.3(2.6)   OUTPUT   Shift Total(mL/kg)       Weight (kg) 88.6 88.6 88.6 88.6                               Physical Exam         Neurosurgery Physical Exam  Neurosurgery Physical Exam    General: well developed, well nourished, no distress.   HEENT: normocephalic, atraumatic  CV: regular rate   Pulmonary: normal respirations, no signs of respiratory distress  Abdomen: soft, non-distended, not tender to palpation  Skin: Skin is warm, dry and intact  Heme: no bruising    Neuro:   Mental Status: AO x4, no aphasia, no dysarthria   CN: PERRL,  EOMI, VF intact to confrontation, sensation intact bilaterally, eyebrow raise and grimace symmetric, tongue midline  Motor: moves all extremities spontaneously, full strength throughout, no pronator drift   Sensory: intact to light touch throughout  Cerebellar: finger to nose intact bilaterally       Incision: Clean, dry, intact. Skin edges well approximated. No surrounding erythema or edema. No drainage or TTP.      Significant Labs:  Recent Labs   Lab 01/04/24  1050 01/05/24  0213   GLU 94 220*    137   K 4.1 4.0    107   CO2 27 17*   BUN 11 12   CREATININE 0.9 0.8   CALCIUM 9.5 8.7   MG  --  1.7     Recent Labs   Lab 01/04/24  1050 01/05/24  0213   WBC 6.40 29.53*   HGB 16.7 15.1   HCT 48.7 44.5    284     Recent Labs   Lab 01/04/24  1050   INR 1.0   APTT 27.5     Microbiology Results (last 7 days)       ** No results found for the last 168 hours. **          All pertinent labs from the last 24 hours have been reviewed.    Significant Diagnostics:  MRI: MRI Brain W WO Contrast    Result Date: 1/5/2024  Postsurgery change in the posterior fossa from recent biopsy/partial resection of enhancing lesion centered about the right cerebellum.  Persistent mass effect in the posterior fossa with some crowding of 4th ventricle.  No overt hydrocephalus at this time but close follow-up is advised. Electronically signed by: Ivan Munroe MD Date:    01/05/2024 Time:    08:35

## 2024-01-05 NOTE — ASSESSMENT & PLAN NOTE
Mr. Sukumar Wiseman this is a 39 y/o M w/ no significant PMHx admitted to [facility] s/p R suboccipital crani for tumor resection     - Post op MRI w/ unchanged mass effect, pathology pending  - SBP<160, well controlled overnight off cardene  - Dex 4q6hrs, taper per NSGY  - PT/OT    Transfer to floor

## 2024-01-05 NOTE — ASSESSMENT & PLAN NOTE
2/2 cerebellar mass resection     - Start valium 5 mg q8hrs PRN for nausea  - Advance diet as tolerated

## 2024-01-05 NOTE — PT/OT/SLP EVAL
"Occupational Therapy   Evaluation    Name: Sukumar Wiseman  MRN: [id number]  Admitting Diagnosis: Cerebellar mass  Recent Surgery: Procedure(s) (LRB):  CRANIOTOMY, SUBOCCIPITAL MIS (Right) 1 Day Post-Op    Recommendations:     Discharge Recommendations: Low Intensity Therapy  Discharge Equipment Recommendations:  none  Barriers to discharge:  None    Assessment:     Sukumar Wiseman is a 38 y.o. male with a medical diagnosis of Cerebellar mass.  He presents with performance deficits affecting function: impaired self care skills, impaired functional mobility, impaired balance.      Rehab Prognosis: Good; patient would benefit from acute skilled OT services to address these deficits and reach maximum level of function.       Plan:     Patient to be seen 3 x/week to address the above listed problems via neuromuscular re-education, therapeutic exercises, therapeutic activities, self-care/home management  Plan of Care Expires: 02/02/24  Plan of Care Reviewed with: patient, spouse    Subjective     Patient: "I've been throwing up all night."    Occupational Profile:  Patient resides in [place] with wife and 2 children ages 8 and 12 in one story home with no steps to enter.  Patient is right handed.  PTA patient independent with ADLs including driving.  Works: nuclear medicine.  Hobbies: hunting/ fishing.  DME: cane, not using.     Pain/Comfort:  Pain Rating 1: 0/10  Pain Rating Post-Intervention 1: 0/10    Patients cultural, spiritual, Baptism conflicts given the current situation: no    Objective:     Communicated with: Nurse prior to session.  Patient found supine with arterial line, miller catheter, peripheral IV, pulse ox (continuous), telemetry, blood pressure cuff upon OT entry to room.    General Precautions: Standard, aspiration, fall  Orthopedic Precautions: N/A  Braces: N/A  Respiratory Status: Room air    Occupational Performance:    Bed Mobility:    Patient completed Rolling/Turning to Left with  modified " independence  Patient completed Rolling/Turning to Right with modified independence  Patient completed Supine to Sit with modified independence  Patient completed Sit to Supine with modified independence    Functional Mobility/Transfers:  Patient completed Sit <> Stand Transfer with stand by assistance  with  no assistive device   Functional Mobility: SBA with side steps    Activities of Daily Living:  Grooming: stand by assistance while standing  Upper Body Dressing: stand by assistance while seated EOB  Lower Body Dressing: stand by assistance while seated    Cognitive/Visual Perceptual:  Cognitive/Psychosocial Skills:     -       Oriented to: Person, Place, and Time   -       Follows Commands/attention:Follows two-step commands  -       Communication: clear/fluent    Physical Exam:  Postural examination/scapula alignment:    -       Rounded shoulders  Edema:  None noted  Upper Extremity Range of Motion:     -       Right Upper Extremity: WNL  -       Left Upper Extremity: WNL  Upper Extremity Strength:    -       Right Upper Extremity: WNL  -       Left Upper Extremity: WNL    AMPAC 6 Click ADL:  AMPAC Total Score: 18    Treatment & Education:  Patient alert and oriented x 3; able to follow 4/4 one step commands.  Patient attentive and interactive throughout the session.      Patient left supine with all lines intact, call button in reach, and bed alarm on    GOALS:   Multidisciplinary Problems       Occupational Therapy Goals          Problem: Occupational Therapy    Goal Priority Disciplines Outcome Interventions   Occupational Therapy Goal     OT, PT/OT Ongoing, Progressing    Description: Goals set 1/5 to be addressed for 14 days with expiration date, 1/19:  Patient will increase functional independence with ADLs by performing:  Patient will demonstrate stand pivot transfers with modified independence.   Not met  Patient will demonstrate grooming while standing with modified independence.   Not met  Patient  will demonstrate upper body dressing with modified independence while seated EOB.   Not met  Patient will demonstrate lower body dressing with modified independence while seated EOB.   Not met  Patient will demonstrate toileting with modified independence.   Not met  Patient will demonstrate bathing while seated EOB with modified independence.   Not met  Patient's family / caregiver will demonstrate independence and safety with assisting patient with self-care skills and functional mobility.     Not met  Patient's family / caregiver will demonstrate independence with providing ROM and changes in bed positioning.   Not met                         History:     History reviewed. No pertinent past medical history.    History reviewed. No pertinent surgical history.    Time Tracking:     OT Date of Treatment: 01/05/24  OT Start Time: 0517  OT Stop Time: 0533  OT Total Time (min): 16 min    Billable Minutes:Evaluation 8  Therapeutic Activity 8    1/5/2024

## 2024-01-05 NOTE — PLAN OF CARE
POC established and functional mobility goals were created to help pt return to PLOF. Will be reassessed as appropriate to measure pt progress.    Problem: Physical Therapy  Goal: Physical Therapy Goal  Description: Goals to be met by: 24     Patient will increase functional independence with mobility by performin. Supine to sit with Joanna  2. Sit to supine with Joanna  3. Sit to stand transfer with Modified Joanna  4. Bed to chair transfer with Modified Joanna using LRAD as needed  5. Gait  x 300 feet with Modified Joanna using LRAD as needed.   6. Lower extremity exercise program x15 reps per handout, with independence    Outcome: Ongoing, Progressing

## 2024-01-05 NOTE — PROGRESS NOTES
[facility]  Neurocritical Care  Progress Note    Admit Date: 1/4/2024  Service Date: 01/05/2024  Length of Stay: 1    Subjective:     Chief Complaint: Cerebellar mass    History of Present Illness: Mr. Sukumar Wiseman this is a 38-year-old M with no PMHx admitted to [facility] s/p  Minimally invasive right suboccipital craniotomy for tumor. Per chart review, patient reports he began having episodes of imbalance more than a month ago.  He reports that he has not had any falls but feels as if he might.  The patient reports that he has not had any other symptoms.  He does report he has had intermittent headaches but believe that those are secondary to stress.  The patient was seen with his wife who also added to the history.  They both report that he has been otherwise healthy and has no significant medical problems.  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 Pending post op imaging. Patient admitted to [facility] for close monitoring and higher level of care.     Hospital Course: 01/05/2024 Persistent nausea overnight, minimal response to zofran/compazine, improved w/ valium, put in valium PRN q8hrs. MRI brain w/ good surgical result, BP controlled overnight. Stable for transfer to floor.     Interval History:  Please see hospital course above for full details     Review of Systems   Eyes:  Positive for photophobia and visual disturbance.   Respiratory: Negative.     Cardiovascular: Negative.    Gastrointestinal:  Positive for nausea. Negative for abdominal pain and vomiting.   Musculoskeletal:  Positive for gait problem and neck pain.   Neurological:  Positive for headaches. Negative for tremors, seizures, syncope, facial asymmetry, speech difficulty and weakness.   Psychiatric/Behavioral:  Positive for sleep disturbance.      Objective:     Vitals:  Temp: 97.9 °F (36.6 °C)  Pulse: 104  Rhythm: sinus tachycardia  BP: (!)  143/88  MAP (mmHg): 107  Resp: 20  SpO2: 99 %    Temp  Min: 96.4 °F (35.8 °C)  Max: 98.4 °F (36.9 °C)  Pulse  Min: 74  Max: 127  BP  Min: 116/83  Max: 161/90  MAP (mmHg)  Min: 94  Max: 113  Resp  Min: 17  Max: 53  SpO2  Min: 93 %  Max: 100 %    01/04 0701 - 01/05 0700  In: 1670.3 [I.V.:620.3]  Out: 1945 [Urine:1845]            Physical Exam  General Appearance: Young gentleman resting in bed, appears uncomfortable, not in acute hemodynamic distress  Mental Status Exam: awake, alert, aware, oriented, no aphasia, no dysarthria  Cranial Nerves: Gaze midline, nystagmus w/ R gaze, EOM intact, visual fields intact to finger counting, no facial asymmetry, no dysarthria   Motor: no drift in the UE/LE. Power is 5/5 in both UE/LE. Normal tone.  Sensory: Symmetric to LT in all 4 limbs without extinction  Coordination: FTN without dysmetria, fine motor regular and fast  Vascular: S1/S2 of normal intensity, no S3/S4 appreciated, no murmurs appreciated  Lungs: CTA bilaterally without wheezing  Abdomen: Soft, non-distended, non-tender, BS +         Medications:  Continuous ScheduleddexAMETHasone, 4 mg, Q6H  [START ON 1/6/2024] heparin (porcine), 5,000 Units, Q8H  senna-docusate 8.6-50 mg, 1 tablet, Daily    PRNacetaminophen, 650 mg, Q6H PRN  calcium gluconate IVPB, 1 g, PRN  calcium gluconate IVPB, 2 g, PRN  calcium gluconate IVPB, 3 g, PRN  dextrose 10%, 12.5 g, PRN  dextrose 10%, 25 g, PRN  diazePAM, 5 mg, Q8H PRN  glucagon (human recombinant), 1 mg, PRN  hydrALAZINE, 10 mg, Q4H PRN  insulin aspart U-100, 0-10 Units, Q6H PRN  labetalol, 10 mg, Q4H PRN  magnesium sulfate IVPB, 2 g, PRN  magnesium sulfate IVPB, 4 g, PRN  mupirocin, , On Call Procedure  ondansetron, 4 mg, Q6H PRN  oxyCODONE, 5 mg, Q6H PRN  potassium chloride, 40 mEq, PRN   And  potassium chloride, 60 mEq, PRN   And  potassium chloride, 80 mEq, PRN  prochlorperazine, 5 mg, Q6H PRN  sodium phosphate 15 mmol in dextrose 5 % (D5W) 250 mL IVPB, 15 mmol, PRN      Today I  personally reviewed pertinent imaging, laboratory results, notably: MRI brain w/ adequate surgical effects, unchanged mass effect. Labs w/ reactive leukocytosis to 19, otherwise unremarkable     Diet  Diet Adult Regular (IDDSI Level 7) Thin; Standard Tray  Diet Adult Regular (IDDSI Level 7) Thin; Standard Tray      Assessment/Plan:     Neuro  * Cerebellar mass  Mr. Sukumar Wiseman this is a 39 y/o M w/ no significant PMHx admitted to [facility] s/p R suboccipital crani for tumor resection     - Post op MRI w/ unchanged mass effect, pathology pending  - SBP<160, well controlled overnight off cardene  - Dex 4q6hrs, taper per NSGY  - PT/OT    Transfer to floor     Oncology  Leukocytosis  Reactive 2/2 OR and high dose steroids     - Afebrile overnight  - Monitor off abx     GI  Nausea  2/2 cerebellar mass resection     - Start valium 5 mg q8hrs PRN for nausea  - Advance diet as tolerated           The patient is being Prophylaxed for:  Venous Thromboembolism with: Mechanical or Chemical -> start HSQ tomorrow for DVT ppx  Stress Ulcer with: None  Ventilator Pneumonia with: not applicable    Activity Orders            Diet Adult Regular (IDDSI Level 7) Thin; Standard Tray: Regular starting at 01/05 1250          Full Code    Transfer to floor    Level III visit    Kiya Duffy MD  Neurocritical Care  [facility] - Neuro Critical Care

## 2024-01-05 NOTE — PROGRESS NOTES
[facility]  Neurosurgery  Progress Note    Subjective:     History of Present Illness: 39yo with hx of occasional balance disturbance presented for removal of right cerebellar mass.    Post-Op Info:  Procedure(s) (LRB):  CRANIOTOMY, SUBOCCIPITAL MIS (Right)   1 Day Post-Op   Interval History: NAEON. AFVSS. Pt has been nauseous which is to be expected. Will keep on dex and ttf today.    Medications:  Continuous Infusions:   nicardipine 5 mg/hr (01/04/24 1801)     Scheduled Meds:   senna-docusate 8.6-50 mg  1 tablet Oral Daily     PRN Meds:acetaminophen, calcium gluconate IVPB, calcium gluconate IVPB, calcium gluconate IVPB, dextrose 10%, dextrose 10%, glucagon (human recombinant), hydrALAZINE, insulin aspart U-100, labetalol, magnesium sulfate IVPB, magnesium sulfate IVPB, mupirocin, ondansetron, oxyCODONE, potassium chloride **AND** potassium chloride **AND** potassium chloride, prochlorperazine, sodium phosphate 15 mmol in dextrose 5 % (D5W) 250 mL IVPB     Review of Systems  Objective:     Weight: 88.6 kg (195 lb 5.2 oz)  Body mass index is 28.03 kg/m².  Vital Signs (Most Recent):  Temp: 98 °F (36.7 °C) (01/05/24 0705)  Pulse: 100 (01/05/24 0900)  Resp: 18 (01/05/24 0900)  BP: (!) 149/84 (01/05/24 0900)  SpO2: 98 % (01/05/24 0900) Vital Signs (24h Range):  Temp:  [96.4 °F (35.8 °C)-98.4 °F (36.9 °C)] 98 °F (36.7 °C)  Pulse:  [] 100  Resp:  [17-53] 18  SpO2:  [93 %-100 %] 98 %  BP: (116-158)/(65-99) 149/84  Arterial Line BP: (104-174)/(59-89) 137/86     Date 01/05/24 0700 - 01/06/24 0659   Shift 7694-4021 0830-5958 0409-0727 24 Hour Total   INTAKE   I.V.(mL/kg) 47.1(0.5)   47.1(0.5)   IV Piggyback 185.2   185.2   Shift Total(mL/kg) 232.3(2.6)   232.3(2.6)   OUTPUT   Shift Total(mL/kg)       Weight (kg) 88.6 88.6 88.6 88.6                               Physical Exam         Neurosurgery Physical Exam  Neurosurgery Physical Exam    General: well developed, well nourished, no distress.    HEENT: normocephalic, atraumatic  CV: regular rate   Pulmonary: normal respirations, no signs of respiratory distress  Abdomen: soft, non-distended, not tender to palpation  Skin: Skin is warm, dry and intact  Heme: no bruising    Neuro:   Mental Status: AO x4, no aphasia, no dysarthria   CN: PERRL, EOMI, VF intact to confrontation, sensation intact bilaterally, eyebrow raise and grimace symmetric, tongue midline  Motor: moves all extremities spontaneously, full strength throughout, no pronator drift   Sensory: intact to light touch throughout  Cerebellar: finger to nose intact bilaterally       Incision: Clean, dry, intact. Skin edges well approximated. No surrounding erythema or edema. No drainage or TTP.      Significant Labs:  Recent Labs   Lab 01/04/24  1050 01/05/24  0213   GLU 94 220*    137   K 4.1 4.0    107   CO2 27 17*   BUN 11 12   CREATININE 0.9 0.8   CALCIUM 9.5 8.7   MG  --  1.7     Recent Labs   Lab 01/04/24  1050 01/05/24  0213   WBC 6.40 29.53*   HGB 16.7 15.1   HCT 48.7 44.5    284     Recent Labs   Lab 01/04/24  1050   INR 1.0   APTT 27.5     Microbiology Results (last 7 days)       ** No results found for the last 168 hours. **          All pertinent labs from the last 24 hours have been reviewed.    Significant Diagnostics:  MRI: MRI Brain W WO Contrast    Result Date: 1/5/2024  Postsurgery change in the posterior fossa from recent biopsy/partial resection of enhancing lesion centered about the right cerebellum.  Persistent mass effect in the posterior fossa with some crowding of 4th ventricle.  No overt hydrocephalus at this time but close follow-up is advised. Electronically signed by: Ivan Munroe MD Date:    01/05/2024 Time:    08:35   Assessment/Plan:     * Cerebellar mass  37yo with hx of occasional balance disturbance presented for removal of right cerebellar mass.    Recommendations:  -D/c Charlee  -continue dex 4q6  -TTF today  -PT/OT  -BP<160    Discussed with   Madison Mayberry MD  Neurosurgery  [facility]

## 2024-01-05 NOTE — ANESTHESIA POSTPROCEDURE EVALUATION
Anesthesia Post Evaluation    Patient: Sukumar Wiseman    Procedure(s) Performed: Procedure(s) (LRB):  CRANIOTOMY, SUBOCCIPITAL MIS (Right)    Final Anesthesia Type: general      Patient location during evaluation: ICU  Patient participation: Yes- Able to Participate  Level of consciousness: awake  Post-procedure vital signs: reviewed and stable  Pain management: adequate  Airway patency: patent    PONV status at discharge: No PONV  Anesthetic complications: no      Cardiovascular status: blood pressure returned to baseline  Respiratory status: unassisted  Hydration status: euvolemic  Follow-up not needed.              Vitals Value Taken Time   /79 01/05/24 0702   Temp 36.7 °C (98.1 °F) 01/05/24 0301   Pulse 99 01/05/24 0716   Resp 22 01/05/24 0716   SpO2 100 % 01/05/24 0716   Vitals shown include unvalidated device data.      No case tracking events are documented in the log.      Pain/Zoltan Score: Pain Rating Prior to Med Admin: 3 (1/4/2024  5:16 PM)

## 2024-01-05 NOTE — PT/OT/SLP EVAL
Physical Therapy  Evaluation and Treatment    Patient Name:  Sukumar Wiseman   MRN:  [id number]    Recent Surgery: Procedure(s) (LRB):  CRANIOTOMY, SUBOCCIPITAL MIS (Right) 1 Day Post-Op    Recommendations:     Discharge Recommendations:    Low intensity therapy  Discharge Equipment Recommendations: walker, rolling   Barriers to discharge: None    Highest Level of Mobility: Gait ~200'  Assistance Required: CGA w/ RW    Assessment:     Sukumar Wiseman is a 38 y.o. male admitted with a medical diagnosis of Cerebellar mass. He presents with the following impairments/functional limitations:  impaired endurance, impaired balance, impaired functional mobility, impaired self care skills, pain, impaired coordination    Pt met with HOB elevated, spouse present and agreeable to PT session. Pt reports his PLOF is (I) with functional mobility and ADLs using no AD. Currently, pt requires CGA to ambulate with RW due to mild lateral instability while ambulating. Pt limited on eval by severe nausea which worsens with movement. Despite nausea, pt participated well. Pt has good functional strength B and mild coordination deficit with R UE and R LE.    Pt would benefit from continued skilled acute PT 3X/wk to address above listed functional deficits, provide patient/caregiver education, reduce fall risk, and maximize (I) and safety with functional mobility.     Rehab Prognosis: Good; patient would benefit from acute skilled PT services to address these deficits and reach maximum level of function.      Plan:     During this hospitalization, patient to be seen 3 x/week to address the identified rehab impairments via gait training, therapeutic activities, therapeutic exercises, neuromuscular re-education and progress toward the following goals:    Plan of Care Expires:  02/05/24    This plan of care has been discussed with the patient/caregiver, who was included in its development and is in agreement with the identified goals and treatment  "plan.     Subjective     Communicated with RN prior to session.  Patient agreeable to participate.     Chief Complaint: Cerebellar mass s/p resection   Patient/Family Comments/goals: "I'm really nauseous"    Pain/Comfort:  Pain Rating 1: 0/10  Location - Orientation 1: generalized  Location 1: head  Pain Addressed 1: Reposition, Distraction, Nurse notified  Pain Rating Post-Intervention 1: 5/10    Patients cultural, spiritual, Judaism conflicts given the current situation: no    Patient's living environment is as follows:  Living Environment: Pt lives with spouse and 2 children (ages 8 and 12) in [facility] with 0 TITUS. Bathroom set-up: tub/shower combo  Prior Level of Function: independent with mobility and ADLs  DME used: none  DME owned (not currently used): single point cane  Upon discharge, patient will have assistance from: Family    Objective:     Patient found HOB elevated with bed alarm, pulse ox (continuous), telemetry, blood pressure cuff  upon PT entry to room.    General Precautions: Standard, aspiration, fall   Orthopedic Precautions:N/A   Braces: N/A   BP (!) 147/81 (BP Location: Right arm, Patient Position: Lying)   Pulse 83   Temp 97.9 °F (36.6 °C) (Oral)   Resp 18   Ht 5' 10" (1.778 m)   Wt 88.6 kg (195 lb 5.2 oz)   SpO2 98%   BMI 28.03 kg/m²   Oxygen Device:  room air      Exams:    Cognition:  Patient is oriented to Person, Place, Time, Situation  Follows multistep  commands  Insight to deficits/safety awareness: intact    Edema: None present    Tone: None present    Postural examination/scapula alignment: No postural abnormalities noted    Lower Extremity Range of Motion:  Right Lower Extremity: WNL  Left Lower Extremity: WNL    Lower Extremity Strength    Right LE  Left LE    Hip Flexion: 5/5 Hip Flexion: 5/5   Knee Extension: 5/5 Knee Extension: 5/5   Knee Flexion: 5/5 Knee Flexion: 5/5   Ankle Dorsiflexion:  5/5 Ankle Dorsiflexion: 5/5   Ankle Plantarflexion: 5/5 Ankle Plantarflexion: 5/5 "        Sensation:   Light touch sensation: Intact BLEs    Functional Mobility:    Bed Mobility:  Supine to Sit: Stand-by Assistance on R side of bed  Scooting anteriorly to EOB to plant feet on floor: Stand-by Assistance    Transfers:   Sit to Stand Transfer: Contact Guard Assistance  from EOB with RW   Bed to Chair: Minimal Assistance with no AD via stand pivot t/f             Gait:  Patient received gait training in hallway ~200 feet with Contact Guard Assistance and rolling walker  Gait Assessment: decreased step length, decreased ligia, and mild ataxia of R LE  Gait Pattern Observed: Step-through reciprocal gait  Comments: All lines remained intact throughout ambulation trial, gait belt utilized, chair follow for patient safety, portable monitor utilized. VSS stable throughout. PT provided cues for RW management and obstacle navigation. Pt requested to return to room due to increased nausea    Balance:  Static Sit:   Supervision at EOB   Static Stand:   Contact-Guard Assist with Rolling walker  Dynamic Stand:  Contact-Guard Assist with Rolling walker      Therapeutic Activities/Exercises     Patient assisted with functional mobility as noted above  Discussed at length benefits of PT as well as d/c recommendations. Pt agreeable  Patient educated on the importance of early mobility, OOB to prevent functional decline during hospital stay  Patient was instructed to utilize staff assistance for mobility/transfers.  Patient is appropriate to transfer with CGA and RN/PCT assist. RW in room  Patient educated on PT POC and role of PT in acute care  White board updated to include patient's safest level of mobility with staff assistance, RN also updated    AM-PAC 6 CLICK MOBILITY  Turning over in bed (including adjusting bedclothes, sheets and blankets)?: 4  Sitting down on and standing up from a chair with arms (e.g., wheelchair, bedside commode, etc.): 3  Moving from lying on back to sitting on the side of the bed?:  3  Moving to and from a bed to a chair (including a wheelchair)?: 3  Need to walk in hospital room?: 3  Climbing 3-5 steps with a railing?: 3  Basic Mobility Total Score: 19      Patient left up in chair with all lines intact, call button in reach, chair alarm on, RN notified, and spouse present.      History/Goals:     PAST MEDICAL HISTORY:  History reviewed. No pertinent past medical history.    Past Surgical History:   Procedure Laterality Date    SUBOCCIPITAL CRANIOTOMY Right 2024    Procedure: CRANIOTOMY, SUBOCCIPITAL MIS;  Surgeon: Luis Wallace MD;  Location: [facility] OR [address] [place] [address];  Service: Neurosurgery;  Laterality: Right;       GOALS:   Multidisciplinary Problems       Physical Therapy Goals          Problem: Physical Therapy    Goal Priority Disciplines Outcome Goal Variances Interventions   Physical Therapy Goal     PT, PT/OT Ongoing, Progressing     Description: Goals to be met by: 24     Patient will increase functional independence with mobility by performin. Supine to sit with Fajardo  2. Sit to supine with Fajardo  3. Sit to stand transfer with Modified Fajardo  4. Bed to chair transfer with Modified Fajardo using LRAD as needed  5. Gait  x 300 feet with Modified Fajardo using LRAD as needed.   6. Lower extremity exercise program x15 reps per handout, with independence                         Time Tracking:     PT Received On: 24  PT Start Time: 1359     PT Stop Time: 1425  PT Total Time (min): 26 min     Billable Minutes: Evaluation 10 and Gait Training 16      Beatrice Kirkpatrick, PT  2024  Pager# [phone]

## 2024-01-05 NOTE — PLAN OF CARE
"[facility] Care Plan    POC reviewed with Sukumar Wiseman and family at 1400. Pt verbalized understanding. Questions and concerns addressed. No acute events today. Pt progressing toward goals. Will continue to monitor. See below and flowsheets for full assessment and VS info.   -Adjusted nausea regimen today per [facility]  -Continued pain control and nausea relief  -Worked with PT/OT today  -Up in chair in afternoon  -Awaiting step-down     Is this a stroke patient? no    Neuro:  [place] Coma Scale  Best Eye Response: 4-->(E4) spontaneous  Best Motor Response: 6-->(M6) obeys commands  Best Verbal Response: 5-->(V5) oriented  [place] Coma Scale Score: 15  Assessment Qualifiers: no eye obstruction present, patient not sedated/intubated  Pupil PERRLA: yes     24 hr Temp:  [96.4 °F (35.8 °C)-98.4 °F (36.9 °C)]     CV:   Rhythm: sinus tachycardia  BP goals:   SBP < 160  MAP > 65    Resp:           Plan: N/A    GI/:     Diet/Nutrition Received: regular  Last Bowel Movement: 01/04/24  Voiding Characteristics: due to void      Unmeasured Output  Urine Occurrence: 1    Labs/Accuchecks:  Recent Labs   Lab 01/05/24  0213   WBC 29.53*   RBC 5.07   HGB 15.1   HCT 44.5         Recent Labs   Lab 01/05/24  0213      K 4.0   CO2 17*      BUN 12   CREATININE 0.8   ALKPHOS 61   ALT 18   AST 11   BILITOT 1.7*      Recent Labs   Lab 01/04/24  1050   INR 1.0   APTT 27.5    No results for input(s): "CPK", "CPKMB", "TROPONINI", "MB" in the last 168 hours.    Electrolytes: Electrolytes replaced  Accuchecks: Q6H    Gtts:      LDA/Wounds:  Lines/Drains/Airways       Peripheral Intravenous Line  Duration                  Peripheral IV - Single Lumen 01/04/24 1052 18 G Left Forearm 1 day         Peripheral IV - Single Lumen 01/04/24 1255 18 G Right Forearm 1 day                  Wounds: No  Wound care consulted: No    "

## 2024-01-05 NOTE — HOSPITAL COURSE
01/05/2024 Persistent nausea overnight, minimal response to zofran/compazine, improved w/ valium, put in valium PRN q8hrs. MRI brain w/ good surgical result, BP controlled overnight. Stable for transfer to floor.   01/06/2024 Continues to have significant nausea, poor tolerance of PO diet. Cleared for home with home PT/OT, will step down to floor pending pt able to tolerate diet

## 2024-01-05 NOTE — PLAN OF CARE
"  [facility] Care Plan    POC reviewed with Sukumar Wiseman and family at 1400. Pt verbalized understanding. Questions and concerns addressed. No acute events today. Pt progressing toward goals. Will continue to monitor. See below and flowsheets for full assessment and VS info.     -placed on 2L NC  -post-op MRI Pending        Is this a stroke patient? no    Neuro:  Yaya Coma Scale  Best Eye Response: 4-->(E4) spontaneous  Best Motor Response: 6-->(M6) obeys commands  Best Verbal Response: 5-->(V5) oriented  [place] Coma Scale Score: 15  Pupil PERRLA: yes     24 hr Temp:  [96.4 °F (35.8 °C)-98.3 °F (36.8 °C)]     CV:   Rhythm: normal sinus rhythm  BP goals:   SBP < 160  MAP > 65    Resp:           Plan: N/A    GI/:     Diet/Nutrition Received: NPO  Last Bowel Movement: 01/04/24  Voiding Characteristics: urethral catheter (bladder)    Intake/Output Summary (Last 24 hours) at 1/4/2024 1836  Last data filed at 1/4/2024 1801  Gross per 24 hour   Intake 1670.33 ml   Output 795 ml   Net 875.33 ml          Labs/Accuchecks:  Recent Labs   Lab 01/04/24  1050   WBC 6.40   RBC 5.50   HGB 16.7   HCT 48.7         Recent Labs   Lab 01/04/24  1050      K 4.1   CO2 27      BUN 11   CREATININE 0.9      Recent Labs   Lab 01/04/24  1050   INR 1.0   APTT 27.5    No results for input(s): "CPK", "CPKMB", "TROPONINI", "MB" in the last 168 hours.    Electrolytes: N/A - electrolytes WDL  Accuchecks: none    Gtts:   nicardipine 5 mg/hr (01/04/24 1801)       LDA/Wounds:  Lines/Drains/Airways       Drain  Duration                  Urethral Catheter 01/04/24 1259 Non-latex 16 Fr. <1 day              Arterial Line  Duration             Arterial Line 01/04/24 1250 Left Radial <1 day              Peripheral Intravenous Line  Duration                  Peripheral IV - Single Lumen 01/04/24 1052 18 G Left Forearm <1 day         Peripheral IV - Single Lumen 01/04/24 1255 18 G Right Forearm <1 day                  Wounds: No  Wound care " consulted: No

## 2024-01-05 NOTE — H&P
[facility]  Neurocritical Care  History & Physical    Admit Date: 1/4/2024  Service Date: 01/04/2024  Length of Stay: 0    Subjective:     Chief Complaint: Cerebellar mass    History of Present Illness: Mr. Sukumar Wiseman this is a 38-year-old M with no PMHx admitted to [facility] s/p  Minimally invasive right suboccipital craniotomy for tumor. Per chart review, patient reports he began having episodes of imbalance more than a month ago.  He reports that he has not had any falls but feels as if he might.  The patient reports that he has not had any other symptoms.  He does report he has had intermittent headaches but believe that those are secondary to stress.  The patient was seen with his wife who also added to the history.  They both report that he has been otherwise healthy and has no significant medical problems.  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 Pending post op imaging. Patient admitted to [facility] for close monitoring and higher level of care.       History reviewed. No pertinent past medical history.  History reviewed. No pertinent surgical history.   No current facility-administered medications on file prior to encounter.     Current Outpatient Medications on File Prior to Encounter   Medication Sig Dispense Refill    ondansetron (ZOFRAN) 4 MG tablet Take 8 mg by mouth 2 (two) times daily.        Allergies: Patient has no known allergies.  Family History   Problem Relation Age of Onset    Hypertension Mother     Hyperlipidemia Mother     Hypertension Father     Colon cancer Maternal Grandfather      Social History     Tobacco Use    Smoking status: Never   Substance Use Topics    Alcohol use: Yes     Review of Systems  Review of Symptoms:  Constitutional: Denies fevers, weight loss, chills, or weakness.  Eyes: Denies changes in vision.  ENT: Denies dysphagia, nasal discharge, ear pain or  discharge.  Cardiovascular: Denies chest pain, palpitations, orthopnea, or claudication.  Respiratory: Denies shortness of breath, cough, hemoptysis, or wheezing.  GI: Denies nausea/vomitting, hematochezia, melena, abd pain, or changes in appetite.  : Denies dysuria, incontinence, or hematuria.  Musculoskeletal: Denies joint pain or myalgias.  Skin/breast: Denies rashes, lumps, lesions, or discharge. + headache  Neurologic: Denies  dizziness, vertigo, or paresthesias.  Psychiatric: Denies changes in mood or hallucinations.  Endocrine: Denies polyuria, polydipsia, heat/cold intolerance.  Hematologic/Lymph: Denies lymphadenopathy, easy bruising or easy bleeding.  Allergic/Immunologic: Denies rash, rhinitis.   Objective:     Vitals:    Temp: 98.3 °F (36.8 °C)  Pulse: 85  BP: (!) 146/82  MAP (mmHg): 107  Resp: 20  SpO2: (!) 94 %    Temp  Min: 98.3 °F (36.8 °C)  Max: 98.3 °F (36.8 °C)  Pulse  Min: 74  Max: 85  BP  Min: 144/85  Max: 146/82  MAP (mmHg)  Min: 107  Max: 115  Resp  Min: 20  Max: 53  SpO2  Min: 93 %  Max: 97 %    No intake/output data recorded.            Physical Exam  GA: Alert, comfortable, no acute distress.   HEENT: No scleral icterus or JVD.   Pulmonary: Clear to auscultation A/P/L. No wheezing, crackles, or rhonchi.  Cardiac: RRR S1 & S2 w/o rubs/murmurs/gallops.   Abdominal: Bowel sounds present x 4. No appreciable hepatosplenomegaly.  Skin: No jaundice, rashes, or visible lesions.  Neuro:  --GCS: E3 V3 M6  --Mental Status:  opens eyes to voice, oriented X3 disoriented to year, follows comamnds, fluent speech  --CN II-XII grossly intact.   --Pupils 3mm, PERRL.   --Corneal reflex, gag, cough intact.  --BIRD spont and against gravity 5/5       Today I personally reviewed pertinent medications, lines/drains/airways, imaging, cardiology results, laboratory results, microbiology results,     Assessment/Plan:     Neuro  * Cerebellar mass  Mr. Sukumar Wiseman this is a 39 y/o M w/ no significant PMHx admitted  to [facility] s/p R suboccipital crani for tumor resection     Pending post op imaging per NSGY  SBP <160  Cardene gtt  Neuro checks q 1 ht  PT/OT/SLP      Cardiac/Vascular  Elevated BP without diagnosis of hypertension  PRN hydralazine and labetalol  Cardene gtt wean as tolerated  EKG pending            The patient is being Prophylaxed for:  Venous Thromboembolism with: Mechanical  Stress Ulcer with: None  Ventilator Pneumonia with: not applicable    Activity Orders            None          Full Code    Nicole Navarro NP  Neurocritical Care  [facility] - Neuro Critical Care

## 2024-01-05 NOTE — PLAN OF CARE
[facility]  Initial Discharge Assessment       Primary Care Provider: Larry Nicholas Jr., MD    Admission Diagnosis: Brain mass [G93.89]  Brain tumor [D49.6]    Admission Date: 1/4/2024  Expected Discharge Date: 1/9/2024    Transition of Care Barriers: None    Payor: [employer]UE [employer] / Plan: [employer]UE [employer] [place] / Product Type: Self Funded /     Extended Emergency Contact Information  Primary Emergency Contact: Ryan Wiseman  Mobile Phone: [phone]  Relation: Spouse    Discharge Plan A: Home Health, Home with family  Discharge Plan B: Home with family, Home Health      [facility] - [place], [place] - [address]  [address] [facility] [address]  Phone: [phone] Fax: [phone]      Transferred from:   Home    History reviewed. No pertinent past medical history.      CM met with patient and Ryan Wiseman (wife) [phone]  in room for Discharge Planning Assessment.  Patient is able to answer questions.  Per patient, he lives with his wife and 2 children in a single story house with 0 step(s) to enter.   Per patient, he was independent with ADLS and used no dme for ambulation.  Patient will have assistance from his wife  upon discharge.   Discharge Planning Booklet given to patient/family and discussed.  All questions addressed.  CM will follow for needs.      Discharge Plan A and Plan B have been determined by review of patient's clinical status, future medical and therapeutic needs, and coverage/benefits for post-acute care in coordination with multidisciplinary team members.      Initial Assessment (most recent)       Adult Discharge Assessment - 01/05/24 1219          Discharge Assessment    Assessment Type Discharge Planning Assessment     Confirmed/corrected address, phone number and insurance Yes     Confirmed Demographics Correct on Facesheet     Source of Information patient;family     Communicated BULMARO with  patient/caregiver Date not available/Unable to determine     Reason For Admission Cerebellar Mass     People in Home spouse;child(julio c), dependent     Facility Arrived From: Home     Do you expect to return to your current living situation? Yes     Do you have help at home or someone to help you manage your care at home? Yes     Who are your caregiver(s) and their phone number(s)? Ryan Wiseman (wife) [phone]     Prior to hospitilization cognitive status: Alert/Oriented     Current cognitive status: Alert/Oriented     Walking or Climbing Stairs Difficulty no     Dressing/Bathing Difficulty no     Home Accessibility stairs to enter home     Number of Stairs, Main Entrance none     Stairs Comment, Main Entrance None     Home Layout Able to live on 1st floor     Equipment Currently Used at Home none     Readmission within 30 days? No     Patient currently being followed by outpatient case management? No     Do you currently have service(s) that help you manage your care at home? No     Do you take prescription medications? Yes     Do you have prescription coverage? Yes     Coverage [facility] [employer]     Do you have any problems affording any of your prescribed medications? No     Is the patient taking medications as prescribed? yes     Who is going to help you get home at discharge? Ryan Wiseman (wife) [phone]     How do you get to doctors appointments? family or friend will provide;car, drives self     Are you on dialysis? No     Do you take coumadin? No     Discharge Plan A Home Health;Home with family     Discharge Plan B Home with family;Home Health     DME Needed Upon Discharge  other (see comments)   tbd    Discharge Plan discussed with: Patient     Transition of Care Barriers None                        Social Determinants of Health     Tobacco Use: Unknown (1/5/2024)    Patient History     Smoking Tobacco Use: Never     Smokeless Tobacco Use: Unknown     Passive Exposure: Not on file   Alcohol Use: Heavy  Drinker (12/25/2023)    AUDIT-C     Frequency of Alcohol Consumption: 2-3 times a week     Average Number of Drinks: 1 or 2     Frequency of Binge Drinking: Less than monthly   Financial Resource Strain: Low Risk  (12/25/2023)    Overall Financial Resource Strain (CARDIA)     Difficulty of Paying Living Expenses: Not hard at all   Food Insecurity: No Food Insecurity (12/25/2023)    Hunger Vital Sign     Worried About Running Out of Food in the Last Year: Never true     Ran Out of Food in the Last Year: Never true   Transportation Needs: No Transportation Needs (12/25/2023)    PRAPARE - Transportation     Lack of Transportation (Medical): No     Lack of Transportation (Non-Medical): No   Physical Activity: Insufficiently Active (12/25/2023)    Exercise Vital Sign     Days of Exercise per Week: 3 days     Minutes of Exercise per Session: 30 min   Stress: Stress Concern Present (12/25/2023)    [employer] - Occupational Stress Questionnaire     Feeling of Stress : Very much   Social Connections: Unknown (12/25/2023)    Social Connection and Isolation Panel [NHANES]     Frequency of Communication with Friends and Family: More than three times a week     Frequency of Social Gatherings with Friends and Family: Once a week     Attends Nondenominational Services: Not on file     Active Member of Clubs or Organizations: No     Attends Club or Organization Meetings: Patient declined     Marital Status:    Housing Stability: Low Risk  (12/25/2023)    Housing Stability Vital Sign     Unable to Pay for Housing in the Last Year: No     Number of Places Lived in the Last Year: 1     Unstable Housing in the Last Year: No   Depression: Low Risk  (12/21/2023)    Depression     Last PHQ-4: Flowsheet Data: 0        Discharge Plan A and Plan B have been determined by review of patient's clinical status, future medical and therapeutic needs, and coverage/benefits for post-acute care in coordination with  multidisciplinary team members.      Eleanor Humphreys RN, CCRN-K, [facility]  Neuro-Critical Care   [phone]

## 2024-01-06 LAB
ALBUMIN SERPL BCP-MCNC: 3.6 G/DL (ref 3.5–5.2)
ALP SERPL-CCNC: 67 U/L (ref 55–135)
ALT SERPL W/O P-5'-P-CCNC: 12 U/L (ref 10–44)
ANION GAP SERPL CALC-SCNC: 8 MMOL/L (ref 8–16)
ANISOCYTOSIS BLD QL SMEAR: SLIGHT
AST SERPL-CCNC: 11 U/L (ref 10–40)
BASOPHILS # BLD AUTO: 0.03 K/UL (ref 0–0.2)
BASOPHILS NFR BLD: 0.1 % (ref 0–1.9)
BILIRUB SERPL-MCNC: 1 MG/DL (ref 0.1–1)
BUN SERPL-MCNC: 13 MG/DL (ref 6–20)
CALCIUM SERPL-MCNC: 9.5 MG/DL (ref 8.7–10.5)
CHLORIDE SERPL-SCNC: 106 MMOL/L (ref 95–110)
CO2 SERPL-SCNC: 24 MMOL/L (ref 23–29)
CREAT SERPL-MCNC: 0.8 MG/DL (ref 0.5–1.4)
DIFFERENTIAL METHOD BLD: ABNORMAL
EOSINOPHIL # BLD AUTO: 0 K/UL (ref 0–0.5)
EOSINOPHIL NFR BLD: 0 % (ref 0–8)
ERYTHROCYTE [DISTWIDTH] IN BLOOD BY AUTOMATED COUNT: 11.9 % (ref 11.5–14.5)
EST. GFR  (NO RACE VARIABLE): >60 ML/MIN/1.73 M^2
GLUCOSE SERPL-MCNC: 137 MG/DL (ref 70–110)
HCT VFR BLD AUTO: 42 % (ref 40–54)
HGB BLD-MCNC: 14.4 G/DL (ref 14–18)
HYPOCHROMIA BLD QL SMEAR: ABNORMAL
IMM GRANULOCYTES # BLD AUTO: 0.11 K/UL (ref 0–0.04)
IMM GRANULOCYTES NFR BLD AUTO: 0.5 % (ref 0–0.5)
LYMPHOCYTES # BLD AUTO: 0.7 K/UL (ref 1–4.8)
LYMPHOCYTES NFR BLD: 3 % (ref 18–48)
MAGNESIUM SERPL-MCNC: 2.3 MG/DL (ref 1.6–2.6)
MCH RBC QN AUTO: 29.6 PG (ref 27–31)
MCHC RBC AUTO-ENTMCNC: 34.3 G/DL (ref 32–36)
MCV RBC AUTO: 86 FL (ref 82–98)
MONOCYTES # BLD AUTO: 0.9 K/UL (ref 0.3–1)
MONOCYTES NFR BLD: 4 % (ref 4–15)
NEUTROPHILS # BLD AUTO: 21.7 K/UL (ref 1.8–7.7)
NEUTROPHILS NFR BLD: 92.4 % (ref 38–73)
NRBC BLD-RTO: 0 /100 WBC
OVALOCYTES BLD QL SMEAR: ABNORMAL
PHOSPHATE SERPL-MCNC: 2.4 MG/DL (ref 2.7–4.5)
PLATELET # BLD AUTO: 307 K/UL (ref 150–450)
PMV BLD AUTO: 8.7 FL (ref 9.2–12.9)
POCT GLUCOSE: 131 MG/DL (ref 70–110)
POCT GLUCOSE: 139 MG/DL (ref 70–110)
POCT GLUCOSE: 140 MG/DL (ref 70–110)
POIKILOCYTOSIS BLD QL SMEAR: SLIGHT
POLYCHROMASIA BLD QL SMEAR: ABNORMAL
POTASSIUM SERPL-SCNC: 4.4 MMOL/L (ref 3.5–5.1)
PROT SERPL-MCNC: 7.1 G/DL (ref 6–8.4)
RBC # BLD AUTO: 4.86 M/UL (ref 4.6–6.2)
SODIUM SERPL-SCNC: 138 MMOL/L (ref 136–145)
SPHEROCYTES BLD QL SMEAR: ABNORMAL
WBC # BLD AUTO: 23.47 K/UL (ref 3.9–12.7)

## 2024-01-06 PROCEDURE — 25000003 PHARM REV CODE 250: Performed by: NURSE PRACTITIONER

## 2024-01-06 PROCEDURE — 99233 SBSQ HOSP IP/OBS HIGH 50: CPT | Mod: ,,, | Performed by: PSYCHIATRY & NEUROLOGY

## 2024-01-06 PROCEDURE — 85025 COMPLETE CBC W/AUTO DIFF WBC: CPT

## 2024-01-06 PROCEDURE — 80053 COMPREHEN METABOLIC PANEL: CPT

## 2024-01-06 PROCEDURE — 63600175 PHARM REV CODE 636 W HCPCS

## 2024-01-06 PROCEDURE — 94761 N-INVAS EAR/PLS OXIMETRY MLT: CPT

## 2024-01-06 PROCEDURE — 25000003 PHARM REV CODE 250

## 2024-01-06 PROCEDURE — 84100 ASSAY OF PHOSPHORUS: CPT

## 2024-01-06 PROCEDURE — 83735 ASSAY OF MAGNESIUM: CPT

## 2024-01-06 PROCEDURE — 63600175 PHARM REV CODE 636 W HCPCS: Performed by: NURSE PRACTITIONER

## 2024-01-06 PROCEDURE — 20600001 HC STEP DOWN PRIVATE ROOM

## 2024-01-06 PROCEDURE — 11000001 HC ACUTE MED/SURG PRIVATE ROOM

## 2024-01-06 RX ORDER — ACETAMINOPHEN 500 MG
1000 TABLET ORAL 3 TIMES DAILY
Status: DISCONTINUED | OUTPATIENT
Start: 2024-01-06 | End: 2024-01-07 | Stop reason: HOSPADM

## 2024-01-06 RX ORDER — ONDANSETRON HYDROCHLORIDE 2 MG/ML
4 INJECTION, SOLUTION INTRAVENOUS EVERY 6 HOURS PRN
Status: DISCONTINUED | OUTPATIENT
Start: 2024-01-06 | End: 2024-01-07 | Stop reason: HOSPADM

## 2024-01-06 RX ORDER — DEXAMETHASONE 4 MG/1
4 TABLET ORAL EVERY 8 HOURS
Status: DISCONTINUED | OUTPATIENT
Start: 2024-01-06 | End: 2024-01-07 | Stop reason: HOSPADM

## 2024-01-06 RX ORDER — DIAZEPAM 5 MG/1
5 TABLET ORAL EVERY 8 HOURS PRN
Status: DISCONTINUED | OUTPATIENT
Start: 2024-01-06 | End: 2024-01-07 | Stop reason: HOSPADM

## 2024-01-06 RX ORDER — DEXAMETHASONE 1 MG/1
2 TABLET ORAL EVERY 12 HOURS
Status: DISCONTINUED | OUTPATIENT
Start: 2024-01-12 | End: 2024-01-07 | Stop reason: HOSPADM

## 2024-01-06 RX ORDER — FAMOTIDINE 20 MG/1
20 TABLET, FILM COATED ORAL 2 TIMES DAILY
Status: DISCONTINUED | OUTPATIENT
Start: 2024-01-06 | End: 2024-01-07 | Stop reason: HOSPADM

## 2024-01-06 RX ORDER — DEXAMETHASONE 1 MG/1
2 TABLET ORAL DAILY
Status: DISCONTINUED | OUTPATIENT
Start: 2024-01-16 | End: 2024-01-07 | Stop reason: HOSPADM

## 2024-01-06 RX ORDER — DEXAMETHASONE 4 MG/1
4 TABLET ORAL EVERY 12 HOURS
Status: DISCONTINUED | OUTPATIENT
Start: 2024-01-09 | End: 2024-01-07 | Stop reason: HOSPADM

## 2024-01-06 RX ORDER — PROCHLORPERAZINE EDISYLATE 5 MG/ML
5 INJECTION INTRAMUSCULAR; INTRAVENOUS EVERY 6 HOURS PRN
Status: DISCONTINUED | OUTPATIENT
Start: 2024-01-06 | End: 2024-01-07 | Stop reason: HOSPADM

## 2024-01-06 RX ADMIN — HEPARIN SODIUM 5000 UNITS: 5000 INJECTION INTRAVENOUS; SUBCUTANEOUS at 09:01

## 2024-01-06 RX ADMIN — ONDANSETRON 4 MG: 2 INJECTION INTRAMUSCULAR; INTRAVENOUS at 05:01

## 2024-01-06 RX ADMIN — DEXAMETHASONE 4 MG: 4 TABLET ORAL at 09:01

## 2024-01-06 RX ADMIN — ACETAMINOPHEN 1000 MG: 500 TABLET ORAL at 02:01

## 2024-01-06 RX ADMIN — DEXAMETHASONE 4 MG: 4 TABLET ORAL at 06:01

## 2024-01-06 RX ADMIN — ONDANSETRON 4 MG: 2 INJECTION INTRAMUSCULAR; INTRAVENOUS at 06:01

## 2024-01-06 RX ADMIN — DIAZEPAM 5 MG: 5 TABLET ORAL at 01:01

## 2024-01-06 RX ADMIN — ACETAMINOPHEN 1000 MG: 500 TABLET ORAL at 09:01

## 2024-01-06 RX ADMIN — DEXAMETHASONE 4 MG: 4 TABLET ORAL at 12:01

## 2024-01-06 RX ADMIN — HEPARIN SODIUM 5000 UNITS: 5000 INJECTION INTRAVENOUS; SUBCUTANEOUS at 06:01

## 2024-01-06 RX ADMIN — OXYCODONE HYDROCHLORIDE 5 MG: 5 TABLET ORAL at 06:01

## 2024-01-06 RX ADMIN — DEXAMETHASONE 4 MG: 4 TABLET ORAL at 11:01

## 2024-01-06 RX ADMIN — DIAZEPAM 5 MG: 5 TABLET ORAL at 12:01

## 2024-01-06 RX ADMIN — FAMOTIDINE 20 MG: 20 TABLET, FILM COATED ORAL at 09:01

## 2024-01-06 RX ADMIN — PROCHLORPERAZINE EDISYLATE 5 MG: 5 INJECTION INTRAMUSCULAR; INTRAVENOUS at 10:01

## 2024-01-06 RX ADMIN — SENNOSIDES AND DOCUSATE SODIUM 1 TABLET: 8.6; 5 TABLET ORAL at 09:01

## 2024-01-06 RX ADMIN — HEPARIN SODIUM 5000 UNITS: 5000 INJECTION INTRAVENOUS; SUBCUTANEOUS at 01:01

## 2024-01-06 RX ADMIN — ACETAMINOPHEN 650 MG: 325 TABLET ORAL at 06:01

## 2024-01-06 NOTE — SUBJECTIVE & OBJECTIVE
Interval History:  Please see hospital course above for full details     Review of Systems   Respiratory: Negative.     Cardiovascular: Negative.    Gastrointestinal:  Positive for nausea. Negative for abdominal distention, abdominal pain, constipation and diarrhea.   Musculoskeletal:  Positive for gait problem and neck pain.        Pain improved from prior   Neurological:  Positive for headaches. Negative for seizures, speech difficulty, weakness, light-headedness and numbness.   Psychiatric/Behavioral:  Positive for sleep disturbance.        Objective:     Vitals:  Temp: 98 °F (36.7 °C)  Pulse: 72  Rhythm: normal sinus rhythm  BP: (!) 140/93  MAP (mmHg): 112  Resp: 18  SpO2: 95 %    Temp  Min: 97.8 °F (36.6 °C)  Max: 98.5 °F (36.9 °C)  Pulse  Min: 62  Max: 92  BP  Min: 125/68  Max: 153/100  MAP (mmHg)  Min: 91  Max: 122  Resp  Min: 11  Max: 26  SpO2  Min: 92 %  Max: 100 %    01/05 0701 - 01/06 0700  In: 407.3 [P.O.:175; I.V.:47.1]  Out: -    Unmeasured Output  Urine Occurrence: 1  Stool Occurrence: 0  Pad Count: 1        Physical Exam  earance: Young gentleman resting in bed, appears tired but not in acute hemodynamic distress; less uncomfortable appearing compared to prior   Mental Status Exam: awake, alert, aware, oriented, no aphasia, no dysarthria  Cranial Nerves: Gaze midline, nystagmus w/ R gaze, EOM intact, visual fields intact to finger counting, no facial asymmetry, no dysarthria   Motor: no drift in the UE/LE. Power is 5/5 in both UE/LE. Normal tone.  Sensory: Symmetric to LT in all 4 limbs without extinction  Coordination: FTN without dysmetria, fine motor regular and fast  Vascular: S1/S2 of normal intensity, no S3/S4 appreciated, no murmurs appreciated  Lungs: CTA bilaterally without wheezing  Abdomen: Soft, non-distended, non-tender, BS +       Medications:  Continuous Scheduledacetaminophen, 1,000 mg, TID  dexAMETHasone, 4 mg, Q8H   Followed by  [START ON 1/9/2024] dexAMETHasone, 4 mg, Q12H    Followed by  [START ON 1/12/2024] dexAMETHasone, 2 mg, Q12H   Followed by  [START ON 1/16/2024] dexAMETHasone, 2 mg, Daily  famotidine, 20 mg, BID  heparin (porcine), 5,000 Units, Q8H  senna-docusate 8.6-50 mg, 1 tablet, Daily    PRNcalcium gluconate IVPB, 1 g, PRN  calcium gluconate IVPB, 2 g, PRN  calcium gluconate IVPB, 3 g, PRN  dextrose 10%, 12.5 g, PRN  dextrose 10%, 25 g, PRN  diazePAM, 5 mg, Q8H PRN  glucagon (human recombinant), 1 mg, PRN  hydrALAZINE, 10 mg, Q4H PRN  insulin aspart U-100, 0-10 Units, Q6H PRN  labetalol, 10 mg, Q4H PRN  magnesium sulfate IVPB, 2 g, PRN  magnesium sulfate IVPB, 4 g, PRN  mupirocin, , On Call Procedure  ondansetron, 4 mg, Q6H PRN  oxyCODONE, 5 mg, Q6H PRN  potassium chloride, 40 mEq, PRN   And  potassium chloride, 60 mEq, PRN   And  potassium chloride, 80 mEq, PRN  prochlorperazine, 5 mg, Q6H PRN  sodium phosphate 15 mmol in dextrose 5 % (D5W) 250 mL IVPB, 15 mmol, PRN      Today I personally reviewed pertinent laboratory results, notably: CBC w/ downtrending leukocytosis to 23.47, Hb/platelets stable. CMP unremarkable     Diet  Diet Adult Regular (IDDSI Level 7) Thin; Standard Tray  Diet Adult Regular (IDDSI Level 7) Thin; Standard Tray  Poor tolerance of PO diet 2/2 severe nausea

## 2024-01-06 NOTE — PROGRESS NOTES
Jerome Oliva - Neurosurgery ([facility])  Neurocritical Care  Progress Note    Admit Date: 1/4/2024  Service Date: 01/06/2024  Length of Stay: 2    Subjective:     Chief Complaint: Cerebellar mass    History of Present Illness: Mr. Sukumar Wiseman this is a 38-year-old M with no PMHx admitted to [facility] s/p  Minimally invasive right suboccipital craniotomy for tumor. Per chart review, patient reports he began having episodes of imbalance more than a month ago.  He reports that he has not had any falls but feels as if he might.  The patient reports that he has not had any other symptoms.  He does report he has had intermittent headaches but believe that those are secondary to stress.  The patient was seen with his wife who also added to the history.  They both report that he has been otherwise healthy and has no significant medical problems.  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 Pending post op imaging. Patient admitted to [facility] for close monitoring and higher level of care.     Hospital Course: 01/05/2024 Persistent nausea overnight, minimal response to zofran/compazine, improved w/ valium, put in valium PRN q8hrs. MRI brain w/ good surgical result, BP controlled overnight. Stable for transfer to floor.   01/06/2024 Continues to have significant nausea, poor tolerance of PO diet. Cleared for home with home PT/OT, will step down to floor pending pt able to tolerate diet     Interval History:  Please see hospital course above for full details     Review of Systems   Respiratory: Negative.     Cardiovascular: Negative.    Gastrointestinal:  Positive for nausea. Negative for abdominal distention, abdominal pain, constipation and diarrhea.   Musculoskeletal:  Positive for gait problem and neck pain.        Pain improved from prior   Neurological:  Positive for headaches. Negative for seizures, speech difficulty, weakness, light-headedness  and numbness.   Psychiatric/Behavioral:  Positive for sleep disturbance.        Objective:     Vitals:  Temp: 98 °F (36.7 °C)  Pulse: 72  Rhythm: normal sinus rhythm  BP: (!) 140/93  MAP (mmHg): 112  Resp: 18  SpO2: 95 %    Temp  Min: 97.8 °F (36.6 °C)  Max: 98.5 °F (36.9 °C)  Pulse  Min: 62  Max: 92  BP  Min: 125/68  Max: 153/100  MAP (mmHg)  Min: 91  Max: 122  Resp  Min: 11  Max: 26  SpO2  Min: 92 %  Max: 100 %    01/05 0701 - 01/06 0700  In: 407.3 [P.O.:175; I.V.:47.1]  Out: -    Unmeasured Output  Urine Occurrence: 1  Stool Occurrence: 0  Pad Count: 1        Physical Exam  earance: Young gentleman resting in bed, appears tired but not in acute hemodynamic distress; less uncomfortable appearing compared to prior   Mental Status Exam: awake, alert, aware, oriented, no aphasia, no dysarthria  Cranial Nerves: Gaze midline, nystagmus w/ R gaze, EOM intact, visual fields intact to finger counting, no facial asymmetry, no dysarthria   Motor: no drift in the UE/LE. Power is 5/5 in both UE/LE. Normal tone.  Sensory: Symmetric to LT in all 4 limbs without extinction  Coordination: FTN without dysmetria, fine motor regular and fast  Vascular: S1/S2 of normal intensity, no S3/S4 appreciated, no murmurs appreciated  Lungs: CTA bilaterally without wheezing  Abdomen: Soft, non-distended, non-tender, BS +       Medications:  Continuous Scheduledacetaminophen, 1,000 mg, TID  dexAMETHasone, 4 mg, Q8H   Followed by  [START ON 1/9/2024] dexAMETHasone, 4 mg, Q12H   Followed by  [START ON 1/12/2024] dexAMETHasone, 2 mg, Q12H   Followed by  [START ON 1/16/2024] dexAMETHasone, 2 mg, Daily  famotidine, 20 mg, BID  heparin (porcine), 5,000 Units, Q8H  senna-docusate 8.6-50 mg, 1 tablet, Daily    PRNcalcium gluconate IVPB, 1 g, PRN  calcium gluconate IVPB, 2 g, PRN  calcium gluconate IVPB, 3 g, PRN  dextrose 10%, 12.5 g, PRN  dextrose 10%, 25 g, PRN  diazePAM, 5 mg, Q8H PRN  glucagon (human recombinant), 1 mg, PRN  hydrALAZINE, 10 mg, Q4H  PRN  insulin aspart U-100, 0-10 Units, Q6H PRN  labetalol, 10 mg, Q4H PRN  magnesium sulfate IVPB, 2 g, PRN  magnesium sulfate IVPB, 4 g, PRN  mupirocin, , On Call Procedure  ondansetron, 4 mg, Q6H PRN  oxyCODONE, 5 mg, Q6H PRN  potassium chloride, 40 mEq, PRN   And  potassium chloride, 60 mEq, PRN   And  potassium chloride, 80 mEq, PRN  prochlorperazine, 5 mg, Q6H PRN  sodium phosphate 15 mmol in dextrose 5 % (D5W) 250 mL IVPB, 15 mmol, PRN      Today I personally reviewed pertinent laboratory results, notably: CBC w/ downtrending leukocytosis to 23.47, Hb/platelets stable. CMP unremarkable     Diet  Diet Adult Regular (IDDSI Level 7) Thin; Standard Tray  Diet Adult Regular (IDDSI Level 7) Thin; Standard Tray  Poor tolerance of PO diet 2/2 severe nausea    Assessment/Plan:     Neuro  * Cerebellar mass  Mr. Sukumar Wiseman this is a 37 y/o M w/ no significant PMHx admitted to [facility] s/p R suboccipital crani for tumor resection     - Post op MRI w/ unchanged mass effect, pathology pending  - SBP<160, well controlled overnight off cardene  - Dex 4q6hrs, taper per NSGY  - PT/OT -> recommending home PT/OT    Transfer to floor. Discharge home pending pt ability to tolerate diet     Cardiac/Vascular  Elevated BP without diagnosis of hypertension  PRN hydralazine and labetalol  No issues overnight, mildly elevated BP post-operatively likely pain related      Oncology  Leukocytosis  Reactive 2/2 OR and high dose steroids     - Afebrile overnight, leukocytosis downtrending  - Monitor off abx     GI  Nausea  2/2 cerebellar mass resection     - C/w valium 5 mg q8hrs PRN for nausea  - Advance diet as tolerated -> poor tolerance to date, slightly improved from prior but remains w/ minimal PO intake 2/2 severe nausea    Discharge home pending pt ability to tolerate PO          The patient is being Prophylaxed for:  Venous Thromboembolism with: Mechanical or Chemical  Stress Ulcer with: H2B  Ventilator Pneumonia with: not  applicable    Activity Orders            Diet Adult Regular (IDDSI Level 7) Thin; Standard Tray: Regular starting at 01/05 1250          Full Code    Stable for transfer to floor. Discharge home pending ability to tolerate PO diet.     Level III visit    Kiya Duffy MD  Neurocritical Care  [facility] - Neurocritical Care Physician

## 2024-01-06 NOTE — ASSESSMENT & PLAN NOTE
Reactive 2/2 OR and high dose steroids     - Afebrile overnight, leukocytosis downtrending  - Monitor off abx

## 2024-01-06 NOTE — ASSESSMENT & PLAN NOTE
PRN hydralazine and labetalol  No issues overnight, mildly elevated BP post-operatively likely pain related

## 2024-01-06 NOTE — PLAN OF CARE
"[facility] Care Plan    POC reviewed with Sukumar Wiseman and family at 1200. Pt verbalized understanding. Questions and concerns addressed. No acute events today. Pt progressing toward goals. Will continue to monitor. See below and flowsheets for full assessment and VS info.             Is this a stroke patient? no    Neuro:  [place] Coma Scale  Best Eye Response: 4-->(E4) spontaneous  Best Motor Response: 6-->(M6) obeys commands  Best Verbal Response: 5-->(V5) oriented  [place] Coma Scale Score: 15  Assessment Qualifiers: patient not sedated/intubated  Pupil PERRLA: yes     24 hr Temp:  [97.8 °F (36.6 °C)-98.5 °F (36.9 °C)]     CV:   Rhythm: normal sinus rhythm  BP goals:   SBP < 160  MAP > 65    Resp:           Plan: N/A    GI/:     Diet/Nutrition Received: regular  Last Bowel Movement: 01/04/24  Voiding Characteristics: voids spontaneously without difficulty    Intake/Output Summary (Last 24 hours) at 1/6/2024 1400  Last data filed at 1/6/2024 1201  Gross per 24 hour   Intake 600 ml   Output --   Net 600 ml     Unmeasured Output  Urine Occurrence: 1  Stool Occurrence: 0  Pad Count: 1    Labs/Accuchecks:  Recent Labs   Lab 01/06/24  0619   WBC 23.47*   RBC 4.86   HGB 14.4   HCT 42.0         Recent Labs   Lab 01/06/24  0619      K 4.4   CO2 24      BUN 13   CREATININE 0.8   ALKPHOS 67   ALT 12   AST 11   BILITOT 1.0      Recent Labs   Lab 01/04/24  1050   INR 1.0   APTT 27.5    No results for input(s): "CPK", "CPKMB", "TROPONINI", "MB" in the last 168 hours.    Electrolytes: N/A - electrolytes WDL  Accuchecks: Q6H    Gtts:      LDA/Wounds:  Lines/Drains/Airways       Peripheral Intravenous Line  Duration                  Peripheral IV - Single Lumen 01/04/24 1052 18 G Left Forearm 2 days         Peripheral IV - Single Lumen 01/04/24 1255 18 G Right Forearm 2 days                  Wounds: Yes; R suboccipital crani  Wound care consulted: No   "

## 2024-01-06 NOTE — ASSESSMENT & PLAN NOTE
Mr. Sukumar Wiseman this is a 37 y/o M w/ no significant PMHx admitted to [facility] s/p R suboccipital crani for tumor resection     - Post op MRI w/ unchanged mass effect, pathology pending  - SBP<160, well controlled overnight off cardene  - Dex 4q6hrs, taper per NSGY  - PT/OT -> recommending home PT/OT    Transfer to floor. Discharge home pending pt ability to tolerate diet

## 2024-01-06 NOTE — ASSESSMENT & PLAN NOTE
2/2 cerebellar mass resection     - C/w valium 5 mg q8hrs PRN for nausea  - Advance diet as tolerated -> poor tolerance to date, slightly improved from prior but remains w/ minimal PO intake 2/2 severe nausea    Discharge home pending pt ability to tolerate PO

## 2024-01-06 NOTE — PLAN OF CARE
"[facility] Care Plan    POC reviewed with Sukumar Wiseman and family at 0300. Pt verbalized understanding. Questions and concerns addressed. No acute events overnight. Pt progressing toward goals. Will continue to monitor. See below and flowsheets for full assessment and VS info.   - tylenol x2 for HA  - oxy x1  - zofran x1  - valium x1  - compazine x1   - pt ambulated to bathroom x2 o/n with walker and nurse on standby   - TTF          Is this a stroke patient? no    Neuro:  [place] Coma Scale  Best Eye Response: 4-->(E4) spontaneous  Best Motor Response: 6-->(M6) obeys commands  Best Verbal Response: 5-->(V5) oriented  Yaya Coma Scale Score: 15  Assessment Qualifiers: patient not sedated/intubated  Pupil PERRLA: yes     24hr Temp:  [97.8 °F (36.6 °C)-98.5 °F (36.9 °C)]     CV:   Rhythm: normal sinus rhythm  BP goals:   SBP < 160  MAP > 65    Resp:           Plan: N/A    GI/:     Diet/Nutrition Received: regular  Last Bowel Movement: (P) 01/04/24  Voiding Characteristics: (P) voids spontaneously without difficulty    Intake/Output Summary (Last 24 hours) at 1/6/2024 0739  Last data filed at 1/5/2024 1611  Gross per 24 hour   Intake 359.65 ml   Output --   Net 359.65 ml     Unmeasured Output  Urine Occurrence: 1  Stool Occurrence: 0    Labs/Accuchecks:  Recent Labs   Lab 01/06/24  0619   WBC 23.47*   RBC 4.86   HGB 14.4   HCT 42.0         Recent Labs   Lab 01/06/24  0619      K 4.4   CO2 24      BUN 13   CREATININE 0.8   ALKPHOS 67   ALT 12   AST 11   BILITOT 1.0      Recent Labs   Lab 01/04/24  1050   INR 1.0   APTT 27.5    No results for input(s): "CPK", "CPKMB", "TROPONINI", "MB" in the last 168 hours.    Electrolytes: N/A - electrolytes WDL  Accuchecks: Q6H    Gtts:      LDA/Wounds:  Lines/Drains/Airways       Peripheral Intravenous Line  Duration                  Peripheral IV - Single Lumen 01/04/24 1052 18 G Left Forearm 1 day         Peripheral IV - Single Lumen 01/04/24 1255 18 G Right " Forearm 1 day                  Wounds: Yes R crani incision  Wound care consulted: No

## 2024-01-06 NOTE — NURSING
Nursing Transfer Note       Transfer To # [id number]    Transfer via wheelchair    Transfer with cardiac monitoring    Transported by ALEXANDRA Potter    Medicines sent:Yes    Chart sent with patient: Yes    Belongings sent with patient: Spouse has all belongings    Notified: spouse    Bedside Neuro assessment performed: Yes    Bedside Handoff given to: ALEXANDRA Alvarenga    Upon arrival to floor: cardiac monitor applied, patient oriented to room, call bell in reach, and bed in lowest position

## 2024-01-07 VITALS
OXYGEN SATURATION: 95 % | SYSTOLIC BLOOD PRESSURE: 134 MMHG | BODY MASS INDEX: 27.96 KG/M2 | DIASTOLIC BLOOD PRESSURE: 90 MMHG | TEMPERATURE: 98 F | HEART RATE: 72 BPM | RESPIRATION RATE: 20 BRPM | WEIGHT: 195.31 LBS | HEIGHT: 70 IN

## 2024-01-07 LAB
ALBUMIN SERPL BCP-MCNC: 3.6 G/DL (ref 3.5–5.2)
ALP SERPL-CCNC: 73 U/L (ref 55–135)
ALT SERPL W/O P-5'-P-CCNC: 17 U/L (ref 10–44)
ANION GAP SERPL CALC-SCNC: 12 MMOL/L (ref 8–16)
AST SERPL-CCNC: 11 U/L (ref 10–40)
BASOPHILS # BLD AUTO: 0.03 K/UL (ref 0–0.2)
BASOPHILS NFR BLD: 0.2 % (ref 0–1.9)
BILIRUB SERPL-MCNC: 1.2 MG/DL (ref 0.1–1)
BUN SERPL-MCNC: 19 MG/DL (ref 6–20)
CALCIUM SERPL-MCNC: 9.7 MG/DL (ref 8.7–10.5)
CHLORIDE SERPL-SCNC: 100 MMOL/L (ref 95–110)
CO2 SERPL-SCNC: 25 MMOL/L (ref 23–29)
CREAT SERPL-MCNC: 0.8 MG/DL (ref 0.5–1.4)
DIFFERENTIAL METHOD BLD: ABNORMAL
EOSINOPHIL # BLD AUTO: 0 K/UL (ref 0–0.5)
EOSINOPHIL NFR BLD: 0 % (ref 0–8)
ERYTHROCYTE [DISTWIDTH] IN BLOOD BY AUTOMATED COUNT: 11.9 % (ref 11.5–14.5)
EST. GFR  (NO RACE VARIABLE): >60 ML/MIN/1.73 M^2
GLUCOSE SERPL-MCNC: 119 MG/DL (ref 70–110)
HCT VFR BLD AUTO: 43.3 % (ref 40–54)
HGB BLD-MCNC: 14.7 G/DL (ref 14–18)
IMM GRANULOCYTES # BLD AUTO: 0.1 K/UL (ref 0–0.04)
IMM GRANULOCYTES NFR BLD AUTO: 0.5 % (ref 0–0.5)
LYMPHOCYTES # BLD AUTO: 0.7 K/UL (ref 1–4.8)
LYMPHOCYTES NFR BLD: 3.6 % (ref 18–48)
MAGNESIUM SERPL-MCNC: 2 MG/DL (ref 1.6–2.6)
MCH RBC QN AUTO: 29.8 PG (ref 27–31)
MCHC RBC AUTO-ENTMCNC: 33.9 G/DL (ref 32–36)
MCV RBC AUTO: 88 FL (ref 82–98)
MONOCYTES # BLD AUTO: 1 K/UL (ref 0.3–1)
MONOCYTES NFR BLD: 5.2 % (ref 4–15)
NEUTROPHILS # BLD AUTO: 17.5 K/UL (ref 1.8–7.7)
NEUTROPHILS NFR BLD: 90.5 % (ref 38–73)
NRBC BLD-RTO: 0 /100 WBC
PHOSPHATE SERPL-MCNC: 3.4 MG/DL (ref 2.7–4.5)
PLATELET # BLD AUTO: 312 K/UL (ref 150–450)
PMV BLD AUTO: 9.5 FL (ref 9.2–12.9)
POCT GLUCOSE: 112 MG/DL (ref 70–110)
POTASSIUM SERPL-SCNC: 4.2 MMOL/L (ref 3.5–5.1)
PROT SERPL-MCNC: 7.3 G/DL (ref 6–8.4)
RBC # BLD AUTO: 4.94 M/UL (ref 4.6–6.2)
SODIUM SERPL-SCNC: 137 MMOL/L (ref 136–145)
WBC # BLD AUTO: 19.35 K/UL (ref 3.9–12.7)

## 2024-01-07 PROCEDURE — 36415 COLL VENOUS BLD VENIPUNCTURE: CPT

## 2024-01-07 PROCEDURE — 25000003 PHARM REV CODE 250

## 2024-01-07 PROCEDURE — 80053 COMPREHEN METABOLIC PANEL: CPT

## 2024-01-07 PROCEDURE — 63600175 PHARM REV CODE 636 W HCPCS

## 2024-01-07 PROCEDURE — 84100 ASSAY OF PHOSPHORUS: CPT

## 2024-01-07 PROCEDURE — 85025 COMPLETE CBC W/AUTO DIFF WBC: CPT

## 2024-01-07 PROCEDURE — 25000003 PHARM REV CODE 250: Performed by: NURSE PRACTITIONER

## 2024-01-07 PROCEDURE — 83735 ASSAY OF MAGNESIUM: CPT

## 2024-01-07 RX ORDER — OXYCODONE HYDROCHLORIDE 5 MG/1
5 TABLET ORAL EVERY 6 HOURS PRN
Qty: 12 TABLET | Refills: 0 | Status: SHIPPED | OUTPATIENT
Start: 2024-01-07 | End: 2024-01-10

## 2024-01-07 RX ORDER — ACETAMINOPHEN 500 MG
500 TABLET ORAL EVERY 6 HOURS PRN
Qty: 56 TABLET | Refills: 0 | Status: SHIPPED | OUTPATIENT
Start: 2024-01-07 | End: 2024-01-21

## 2024-01-07 RX ORDER — DEXAMETHASONE 4 MG/1
TABLET ORAL
Qty: 33 TABLET | Refills: 0 | Status: SHIPPED | OUTPATIENT
Start: 2024-01-07

## 2024-01-07 RX ORDER — DEXAMETHASONE 4 MG/1
TABLET ORAL
Qty: 33 TABLET | Refills: 0 | Status: SHIPPED | OUTPATIENT
Start: 2024-01-07 | End: 2024-01-07

## 2024-01-07 RX ORDER — PROMETHAZINE HYDROCHLORIDE 25 MG/1
25 TABLET ORAL EVERY 6 HOURS PRN
Qty: 56 TABLET | Refills: 0 | Status: SHIPPED | OUTPATIENT
Start: 2024-01-07 | End: 2024-01-21

## 2024-01-07 RX ORDER — ONDANSETRON 4 MG/1
8 TABLET, FILM COATED ORAL EVERY 6 HOURS PRN
Qty: 84 TABLET | Refills: 0 | Status: SHIPPED | OUTPATIENT
Start: 2024-01-07 | End: 2024-01-07

## 2024-01-07 RX ORDER — OXYCODONE HYDROCHLORIDE 5 MG/1
5 TABLET ORAL EVERY 6 HOURS PRN
Qty: 12 TABLET | Refills: 0 | Status: SHIPPED | OUTPATIENT
Start: 2024-01-07 | End: 2024-01-07

## 2024-01-07 RX ORDER — ONDANSETRON 4 MG/1
8 TABLET, FILM COATED ORAL EVERY 6 HOURS PRN
Qty: 84 TABLET | Refills: 0 | Status: SHIPPED | OUTPATIENT
Start: 2024-01-07 | End: 2024-01-28

## 2024-01-07 RX ADMIN — ONDANSETRON 4 MG: 2 INJECTION INTRAMUSCULAR; INTRAVENOUS at 11:01

## 2024-01-07 RX ADMIN — DIAZEPAM 5 MG: 5 TABLET ORAL at 05:01

## 2024-01-07 RX ADMIN — HEPARIN SODIUM 5000 UNITS: 5000 INJECTION INTRAVENOUS; SUBCUTANEOUS at 05:01

## 2024-01-07 RX ADMIN — SENNOSIDES AND DOCUSATE SODIUM 1 TABLET: 8.6; 5 TABLET ORAL at 08:01

## 2024-01-07 RX ADMIN — DEXAMETHASONE 4 MG: 4 TABLET ORAL at 05:01

## 2024-01-07 RX ADMIN — FAMOTIDINE 20 MG: 20 TABLET, FILM COATED ORAL at 08:01

## 2024-01-07 RX ADMIN — ACETAMINOPHEN 1000 MG: 500 TABLET ORAL at 08:01

## 2024-01-07 NOTE — DISCHARGE INSTRUCTIONS
--Patient stable for discharge to home    --Please take prescriptions as detailed in medication list    --All questions/concerns addressed and answered    --Please followup with neurosurgery clinic in 2 weeks    --Please call immediately for any new onset nausea/vomiting/fever/chills, wound breakdown, numbness/tingling/weakness        Wound Care Instructions:  -If you have any dressings at discharge, please remove 48 hours after the surgery.  -If you have dissolvable sutures and glue over your incisions; do not pick at them; they will dissolve over a couple of weeks.  -If you have steri strips, do not remove, as they will fall off.  -If you have staples, do not remove, as they will be removed at clinic follow up.  -You may shower daily but do not soak or submerge wound in water.  -Scalp/head incisions, wash hair daily with baby shampoo and do not use hair products. Pat incision dry, do not rub.  -For head incisions, do not wear scarfs or hats.  -Keep all wounds clean, dry, and open to air.  -Do not apply creams or ointments to the wound.  -No driving while on narcotic pain medications  -Call Neurosurgery if the wound opens, drains, or becomes red

## 2024-01-07 NOTE — PLAN OF CARE
Problem: Adult Inpatient Plan of Care  Goal: Plan of Care Review  Outcome: Ongoing, Progressing  Goal: Patient-Specific Goal (Individualized)  Outcome: Ongoing, Progressing  Goal: Absence of Hospital-Acquired Illness or Injury  Outcome: Ongoing, Progressing  Goal: Optimal Comfort and Wellbeing  Outcome: Ongoing, Progressing  Goal: Readiness for Transition of Care  Outcome: Ongoing, Progressing     Problem: Infection  Goal: Absence of Infection Signs and Symptoms  Outcome: Ongoing, Progressing  POC and meds reviewed with patient and spouse.All needs addressed.Will continue to monitor.

## 2024-01-07 NOTE — NURSING
Patient complains of mild nausea zofran given  Noted with discharge to back of dressing outlined for eval of progression  Denies any headache but continues with blurred / double vision   Wctm

## 2024-01-07 NOTE — PROGRESS NOTES
Jerome Oliva - Neurosurgery ([facility])  Neurosurgery  Progress Note    Subjective:     History of Present Illness: 37yo with hx of occasional balance disturbance presented for removal of right cerebellar mass.    Post-Op Info:  Procedure(s) (LRB):  CRANIOTOMY, SUBOCCIPITAL MIS (Right)   2 Days Post-Op   Interval History: 1/6 naeon, exam stable and post op imaging completed with expected changes. Pt tolerating diet, continued HA and nausea. TTF today, likely discharge tomororw if continued improvement.    Medications:  Continuous Infusions:      Scheduled Meds:   acetaminophen  1,000 mg Oral TID    dexAMETHasone  4 mg Oral Q8H    Followed by    [START ON 1/9/2024] dexAMETHasone  4 mg Oral Q12H    Followed by    [START ON 1/12/2024] dexAMETHasone  2 mg Oral Q12H    Followed by    [START ON 1/16/2024] dexAMETHasone  2 mg Oral Daily    famotidine  20 mg Oral BID    heparin (porcine)  5,000 Units Subcutaneous Q8H    senna-docusate 8.6-50 mg  1 tablet Oral Daily     PRN Meds:calcium gluconate IVPB, calcium gluconate IVPB, calcium gluconate IVPB, dextrose 10%, dextrose 10%, diazePAM, glucagon (human recombinant), hydrALAZINE, insulin aspart U-100, labetalol, magnesium sulfate IVPB, magnesium sulfate IVPB, mupirocin, ondansetron, oxyCODONE, potassium chloride **AND** potassium chloride **AND** potassium chloride, prochlorperazine, sodium phosphate 15 mmol in dextrose 5 % (D5W) 250 mL IVPB     Review of Systems  Objective:     Weight: 88.6 kg (195 lb 5.2 oz)  Body mass index is 28.03 kg/m².  Vital Signs (Most Recent):  Temp: 97.8 °F (36.6 °C) (01/06/24 1713)  Pulse: 65 (01/06/24 1713)  Resp: 18 (01/06/24 1713)  BP: (!) 161/94 (01/06/24 1713)  SpO2: 96 % (01/06/24 1713) Vital Signs (24h Range):  Temp:  [97.8 °F (36.6 °C)-98.5 °F (36.9 °C)] 97.8 °F (36.6 °C)  Pulse:  [62-83] 65  Resp:  [13-26] 18  SpO2:  [92 %-96 %] 96 %  BP: (125-161)/() 161/94     Date 01/06/24 0700 - 01/07/24 0659   Shift 8773-1032 9436-9563 6076-6599 24  "Hour Total   INTAKE   P.O. 480   480   I.V.(mL/kg) 20(0.2)   20(0.2)   Shift Total(mL/kg) 500(5.6)   500(5.6)   OUTPUT   Shift Total(mL/kg)       Weight (kg) 88.6 88.6 88.6 88.6                                Physical Exam         Neurosurgery Physical Exam  Neurosurgery Physical Exam    General: well developed, well nourished, no distress.   HEENT: normocephalic, atraumatic  CV: regular rate   Pulmonary: normal respirations, no signs of respiratory distress  Abdomen: soft, non-distended, not tender to palpation  Skin: Skin is warm, dry and intact  Heme: no bruising    Neuro:   Mental Status: AO x4, no aphasia, no dysarthria   CN: PERRL, EOMI, VF intact to confrontation, sensation intact bilaterally, eyebrow raise and grimace symmetric, tongue midline  Motor: moves all extremities spontaneously, full strength throughout, no pronator drift   Sensory: intact to light touch throughout  Cerebellar: finger to nose intact bilaterally       Incision: Clean, dry, intact. Skin edges well approximated. No surrounding erythema or edema. No drainage or TTP.      Significant Labs:  Recent Labs   Lab 01/05/24  0213 01/06/24  0619   * 137*    138   K 4.0 4.4    106   CO2 17* 24   BUN 12 13   CREATININE 0.8 0.8   CALCIUM 8.7 9.5   MG 1.7 2.3       Recent Labs   Lab 01/05/24  0213 01/06/24  0619   WBC 29.53* 23.47*   HGB 15.1 14.4   HCT 44.5 42.0    307       No results for input(s): "LABPT", "INR", "APTT" in the last 48 hours.    Microbiology Results (last 7 days)       ** No results found for the last 168 hours. **          All pertinent labs from the last 24 hours have been reviewed.    Significant Diagnostics:  MRI: MRI Brain W WO Contrast    Result Date: 1/5/2024  Postsurgery change in the posterior fossa from recent biopsy/partial resection of enhancing lesion centered about the right cerebellum.  Persistent mass effect in the posterior fossa with some crowding of 4th ventricle.  No overt " hydrocephalus at this time but close follow-up is advised. Electronically signed by: Ivan Munroe MD Date:    01/05/2024 Time:    08:35   Assessment/Plan:     * Cerebellar mass  39yo with hx of occasional balance disturbance presented for removal of right cerebellar mass.    Recommendations:  TTF to NSGY  Q4h neuro checks  -continue dex 4q6  -PT/OT,OOB  -SBP<160  - ok for ppx SQH  N/V and HA control, likely home tomorrow    Discussed with Dr. Madison Mireles MD  Neurosurgery  [facility] - Neurosurgery ([facility])

## 2024-01-07 NOTE — PLAN OF CARE
[facility] - Neurosurgery (Hospital)  Discharge Final Note    Primary Care Provider: Larry Nicholas Jr., MD    Expected Discharge Date: 1/7/2024    Final Discharge Note (most recent)       Final Note - 01/07/24 1716          Final Note    Assessment Type Final Discharge Note     Anticipated Discharge Disposition Home or Self Care        Post-Acute Status    Post-Acute Authorization Other     Coverage [employer] - [employer] [place] [place] [place] -     Other Status No Post-Acute Service Needs                     Important Message from Medicare             Contact Info       Luis Wallace MD   Specialty: Neurosurgery, Spine Surgery    [address]  [place] [address]   Phone: [phone]       Next Steps: Go on 1/23/2024    Instructions: Follow up 1/23 at 145p          Future Appointments   Date Time Provider Department Center   1/23/2024  1:45 PM Luis Wallace MD [facility] NEUROSC Dao Mcallister

## 2024-01-07 NOTE — DISCHARGE SUMMARY
[facility] Oliva - Neurosurgery ([facility])  Neurosurgery  Discharge Summary      Patient Name: Sukumar Wiseman  MRN: [id number]  Admission Date: 1/4/2024  Hospital Length of Stay: 3 days  Discharge Date and Time:  01/07/2024 8:57 AM  Attending Physician: Luis Wallace MD   Discharging Provider: Cesar Mayberry MD  Primary Care Provider: Larry Nicholas Jr., MD    HPI:   37yo with hx of occasional balance disturbance presented for removal of right cerebellar mass.    Procedure(s) (LRB):  CRANIOTOMY, SUBOCCIPITAL MIS (Right)     Hospital Course: Patient was admitted for Cerebellar mass removal on 1/4/24 and underwent Suboccipital crani on 1/4/24 without perioperative complications. The patient remained on abx until all drains were discontinued and was kept on appropriate DVT prophylaxis during the course of admission. At the time of discharge, the patient was tolerating PO intake without N/V, dysphagia, denied bowel or bladder dysfunction, denied new neurological symptoms, and reported pain controlled with current regimen. The surgical site was without evidence of drainage, breakdown or infection and all staples were intact. Therapy recommendations of Home have been arranged and the patient will follow up in clinic as indicated in discharge instructions. All questions were answered and continued treatment/wound care instructions were discussed in detail prior to discharge.      Goals of Care Treatment Preferences:  Code Status: Full Code      Consults:     Significant Diagnostic Studies: N/A    Pending Diagnostic Studies:       Procedure Component Value Units Date/Time    Specimen to Pathology, Surgery Neurosurgery [[id number]] Collected: 01/04/24 1435    Order Status: Sent Lab Status: In process Updated: 01/05/24 0933    Specimen: Tissue           Final Active Diagnoses:    Diagnosis Date Noted POA    PRINCIPAL PROBLEM:  Cerebellar mass [G93.89] 01/04/2024 Yes    Nausea [R11.0] 01/05/2024 Yes    Leukocytosis [D72.829]  01/05/2024 No    Elevated BP without diagnosis of hypertension [R03.0] 03/18/2021 Yes      Problems Resolved During this Admission:      Discharged Condition: good     Disposition: Home or Self Care    Follow Up:    Patient Instructions:      Diet Adult Regular     No dressing needed     Activity as tolerated     --Patient stable for discharge to home    --Please take prescriptions as detailed in medication list    --All questions/concerns addressed and answered    --Please followup with neurosurgery clinic in 2 weeks    --Please call immediately for any new onset nausea/vomiting/fever/chills, wound breakdown, numbness/tingling/weakness        Wound Care Instructions:  -If you have any dressings at discharge, please remove 48 hours after the surgery.  -If you have dissolvable sutures and glue over your incisions; do not pick at them; they will dissolve over a couple of weeks.  -If you have steri strips, do not remove, as they will fall off.  -If you have staples, do not remove, as they will be removed at clinic follow up.  -You may shower daily but do not soak or submerge wound in water.  -Scalp/head incisions, wash hair daily with baby shampoo and do not use hair products. Pat incision dry, do not rub.  -For head incisions, do not wear scarfs or hats.  -Keep all wounds clean, dry, and open to air.  -Do not apply creams or ointments to the wound.  -No driving while on narcotic pain medications  -Call Neurosurgery if the wound opens, drains, or becomes red       Medications:  Reconciled Home Medications:      Medication List        START taking these medications      acetaminophen 500 MG tablet  Commonly known as: TYLENOL  Take 1 tablet (500 mg total) by mouth every 6 (six) hours as needed for Pain.     dexAMETHasone 4 MG Tab  Commonly known as: DECADRON  --a single 2mg tablet every 6 hours for 3 days --a single 2mg tablet every 8 hours for 3 days --a single 2mg tablet every 12 hours for 4 days --a single 2mg tablet  every morning for 4 days     oxyCODONE 5 MG immediate release tablet  Commonly known as: ROXICODONE  Take 1 tablet (5 mg total) by mouth every 6 (six) hours as needed for Pain.            CHANGE how you take these medications      ondansetron 4 MG tablet  Commonly known as: ZOFRAN  Take 2 tablets (8 mg total) by mouth every 6 (six) hours as needed for Nausea.  What changed:   when to take this  reasons to take this              Cesar Mayberry MD  Neurosurgery  [facility] - Neurosurgery ([facility])

## 2024-01-07 NOTE — ASSESSMENT & PLAN NOTE
37yo with hx of occasional balance disturbance presented for removal of right cerebellar mass.    Recommendations:  TTF to NSGY  Q4h neuro checks  -continue dex 4q6  -PT/OT,OOB  -SBP<160  - ok for ppx SQH  N/V and HA control, likely home tomorrow    Discussed with Dr. Wallace

## 2024-01-07 NOTE — NURSING
Patient is discharged to home.Iv access had been removed, discharge instructions given at bedside and patient verbalized understanding.

## 2024-01-07 NOTE — SUBJECTIVE & OBJECTIVE
Interval History: 1/6 naeon, exam stable and post op imaging completed with expected changes. Pt tolerating diet, continued HA and nausea. TTF today, likely discharge tomororw if continued improvement.    Medications:  Continuous Infusions:      Scheduled Meds:   acetaminophen  1,000 mg Oral TID    dexAMETHasone  4 mg Oral Q8H    Followed by    [START ON 1/9/2024] dexAMETHasone  4 mg Oral Q12H    Followed by    [START ON 1/12/2024] dexAMETHasone  2 mg Oral Q12H    Followed by    [START ON 1/16/2024] dexAMETHasone  2 mg Oral Daily    famotidine  20 mg Oral BID    heparin (porcine)  5,000 Units Subcutaneous Q8H    senna-docusate 8.6-50 mg  1 tablet Oral Daily     PRN Meds:calcium gluconate IVPB, calcium gluconate IVPB, calcium gluconate IVPB, dextrose 10%, dextrose 10%, diazePAM, glucagon (human recombinant), hydrALAZINE, insulin aspart U-100, labetalol, magnesium sulfate IVPB, magnesium sulfate IVPB, mupirocin, ondansetron, oxyCODONE, potassium chloride **AND** potassium chloride **AND** potassium chloride, prochlorperazine, sodium phosphate 15 mmol in dextrose 5 % (D5W) 250 mL IVPB     Review of Systems  Objective:     Weight: 88.6 kg (195 lb 5.2 oz)  Body mass index is 28.03 kg/m².  Vital Signs (Most Recent):  Temp: 97.8 °F (36.6 °C) (01/06/24 1713)  Pulse: 65 (01/06/24 1713)  Resp: 18 (01/06/24 1713)  BP: (!) 161/94 (01/06/24 1713)  SpO2: 96 % (01/06/24 1713) Vital Signs (24h Range):  Temp:  [97.8 °F (36.6 °C)-98.5 °F (36.9 °C)] 97.8 °F (36.6 °C)  Pulse:  [62-83] 65  Resp:  [13-26] 18  SpO2:  [92 %-96 %] 96 %  BP: (125-161)/() 161/94     Date 01/06/24 0700 - 01/07/24 0659   Shift 9608-7665 6595-7567 5844-2303 24 Hour Total   INTAKE   P.O. 480   480   I.V.(mL/kg) 20(0.2)   20(0.2)   Shift Total(mL/kg) 500(5.6)   500(5.6)   OUTPUT   Shift Total(mL/kg)       Weight (kg) 88.6 88.6 88.6 88.6                                 Physical Exam         Neurosurgery Physical Exam  Neurosurgery Physical Exam    General:  "well developed, well nourished, no distress.   HEENT: normocephalic, atraumatic  CV: regular rate   Pulmonary: normal respirations, no signs of respiratory distress  Abdomen: soft, non-distended, not tender to palpation  Skin: Skin is warm, dry and intact  Heme: no bruising    Neuro:   Mental Status: AO x4, no aphasia, no dysarthria   CN: PERRL, EOMI, VF intact to confrontation, sensation intact bilaterally, eyebrow raise and grimace symmetric, tongue midline  Motor: moves all extremities spontaneously, full strength throughout, no pronator drift   Sensory: intact to light touch throughout  Cerebellar: finger to nose intact bilaterally       Incision: Clean, dry, intact. Skin edges well approximated. No surrounding erythema or edema. No drainage or TTP.      Significant Labs:  Recent Labs   Lab 01/05/24  0213 01/06/24  0619   * 137*    138   K 4.0 4.4    106   CO2 17* 24   BUN 12 13   CREATININE 0.8 0.8   CALCIUM 8.7 9.5   MG 1.7 2.3       Recent Labs   Lab 01/05/24  0213 01/06/24  0619   WBC 29.53* 23.47*   HGB 15.1 14.4   HCT 44.5 42.0    307       No results for input(s): "LABPT", "INR", "APTT" in the last 48 hours.    Microbiology Results (last 7 days)       ** No results found for the last 168 hours. **          All pertinent labs from the last 24 hours have been reviewed.    Significant Diagnostics:  MRI: MRI Brain W WO Contrast    Result Date: 1/5/2024  Postsurgery change in the posterior fossa from recent biopsy/partial resection of enhancing lesion centered about the right cerebellum.  Persistent mass effect in the posterior fossa with some crowding of 4th ventricle.  No overt hydrocephalus at this time but close follow-up is advised. Electronically signed by: Ivan Munroe MD Date:    01/05/2024 Time:    08:35   "

## 2024-01-07 NOTE — HOSPITAL COURSE
Patient was admitted for Cerebellar mass removal on 1/4/24 and underwent Suboccipital crani on 1/4/24 without perioperative complications. The patient remained on abx until all drains were discontinued and was kept on appropriate DVT prophylaxis during the course of admission. At the time of discharge, the patient was tolerating PO intake without N/V, dysphagia, denied bowel or bladder dysfunction, denied new neurological symptoms, and reported pain controlled with current regimen. The surgical site was without evidence of drainage, breakdown or infection and all staples were intact. Therapy recommendations of Home have been arranged and the patient will follow up in clinic as indicated in discharge instructions. All questions were answered and continued treatment/wound care instructions were discussed in detail prior to discharge.

## 2024-01-08 ENCOUNTER — TELEPHONE (OUTPATIENT)
Dept: NEUROSURGERY | Facility: CLINIC | Age: 39
End: 2024-01-08
Payer: COMMERCIAL

## 2024-01-08 LAB
BLD PROD TYP BPU: NORMAL
BLD PROD TYP BPU: NORMAL
BLOOD UNIT EXPIRATION DATE: NORMAL
BLOOD UNIT EXPIRATION DATE: NORMAL
BLOOD UNIT TYPE CODE: 6200
BLOOD UNIT TYPE CODE: 6200
BLOOD UNIT TYPE: NORMAL
BLOOD UNIT TYPE: NORMAL
CODING SYSTEM: NORMAL
CODING SYSTEM: NORMAL
CROSSMATCH INTERPRETATION: NORMAL
CROSSMATCH INTERPRETATION: NORMAL
DISPENSE STATUS: NORMAL
DISPENSE STATUS: NORMAL
NUM UNITS TRANS PACKED RBC: NORMAL
NUM UNITS TRANS PACKED RBC: NORMAL

## 2024-01-08 NOTE — TELEPHONE ENCOUNTER
----- Message from Onelia Mcfarland sent at 1/8/2024 11:21 AM CST -----  Regarding: Callback  Contact: Mariaa/wife [phone]  Patient's wife calling requesting a call back from nurse or provider. Please call as soon as possible.

## 2024-01-10 ENCOUNTER — TELEPHONE (OUTPATIENT)
Dept: NEUROSURGERY | Facility: CLINIC | Age: 39
End: 2024-01-10
Payer: COMMERCIAL

## 2024-01-10 ENCOUNTER — PATIENT MESSAGE (OUTPATIENT)
Dept: ADMINISTRATIVE | Facility: CLINIC | Age: 39
End: 2024-01-10
Payer: COMMERCIAL

## 2024-01-10 ENCOUNTER — PATIENT OUTREACH (OUTPATIENT)
Dept: ADMINISTRATIVE | Facility: CLINIC | Age: 39
End: 2024-01-10
Payer: COMMERCIAL

## 2024-01-10 NOTE — TELEPHONE ENCOUNTER
S/w wife. Informed her fax has been sent to both [employer] [facility] and [facility]. Had questions about home PT/OT. Will verify visits are needed. V/u and thanks.

## 2024-01-10 NOTE — PROGRESS NOTES
C3 nurse spoke with Sukumar Wiseman's spouse Ryan for a TCC post hospital discharge follow up call. Pts spouse states the pt is still having nausea, dizziness, and vision problems but the pt states they have improved since discharge. She verbalizes understanding to notify the surgical team for any worsening symptoms. She also requests clarification with the pts decadron order. The pills given by the pharmacy are decadron 4mg, though the order states to administer 2mg tablets. She was questioning whether she was supposed to break the tablets in two or administer the whole 4mg, and what exact mg tablet was administered. She states she has been administering the whole 4mg tablet Q6 hours for 3 days. C3 nurse called [facility] and the staff member stated 4mg tablets were given and they were to be broken in half to administer a 2mg dose. She stated she would contact the ordering provider Cesar Mayberry MD and have his team call the pts spouse for clarification. The pts spouse verbalized understanding that she would be receiving a phone call for clarification and to notify Dr. Wallace's team via SecureChat if she has not heard from anyone within a few hours. She also has the [facility] number and verbalized understanding to call if needed.    The patient does not have a scheduled HOSFU appointment with his PCP, Larry Nicholas Jr., MD within 5-7 days post hospital discharge date of 1/7/24. C3 nurse was unable to schedule HOSFU appointment in [facility] or with [facility] and the pts spouse requested a callback from Larry Nicholas Jr., MD to schedule a HOSFU.    Message sent to PCP staff requesting they contact patient and schedule follow up appointment.

## 2024-01-10 NOTE — PROGRESS NOTES
C3 nurse attempted to contact Sukumar Wiseman for a TCC post hospital discharge follow up call. No answer. Left voicemail with callback information. The patient does not have a scheduled HOSFU appointment with his PCP, Larry Nicholas Jr., MD within the first 5-7 days of his discharge date of 1/7/24. No messages routed at this time.

## 2024-01-10 NOTE — TELEPHONE ENCOUNTER
----- Message from Savanna Martín Grimaldo sent at 1/10/2024  9:38 AM CST -----  Regarding: Sign FMLA Papers  Contact: Pt's Wife Ryan @[phone]  Pt is calling to speak to someone in the office to discuss FMLA forms that were submitted to the office. Pt states that they have not heard back from anyone in the office and a signature is needed from provider. Pt is asking for a return call back to advise. Thanks.     Please fax to : [facility] [phone]

## 2024-01-11 ENCOUNTER — TELEPHONE (OUTPATIENT)
Dept: NEUROSURGERY | Facility: CLINIC | Age: 39
End: 2024-01-11
Payer: COMMERCIAL

## 2024-01-11 NOTE — TELEPHONE ENCOUNTER
Called wife to check on pt. He is doing better. Did great past 2 days. Today, some nausea, relieved by meds. Ambulatory without AD. Some dizziness, dbl vision left eye persists.    Encouraged patient to call with any further questions or concerns.

## 2024-01-16 ENCOUNTER — TELEPHONE (OUTPATIENT)
Dept: NEUROSURGERY | Facility: CLINIC | Age: 39
End: 2024-01-16
Payer: COMMERCIAL

## 2024-01-16 NOTE — TELEPHONE ENCOUNTER
Pts wife requested PT order to be sent [facility] [place]  Order to be sent.  [facility] out of office due to weather and unable to obtain fax number today.     ----- Message from Marysol Keene sent at 1/16/2024  8:47 AM CST -----  Regarding: Pt Advice  Contact: almita/Wife [phone]  Almita/wife requesting a call back to discuss pt plan of care please call pt @almita/Wife [phone]

## 2024-01-17 DIAGNOSIS — G93.89 BRAIN MASS: Primary | ICD-10-CM

## 2024-01-17 DIAGNOSIS — Z98.890 S/P CRANIOTOMY: ICD-10-CM

## 2024-01-19 ENCOUNTER — TELEPHONE (OUTPATIENT)
Dept: NEUROSURGERY | Facility: CLINIC | Age: 39
End: 2024-01-19
Payer: COMMERCIAL

## 2024-01-19 DIAGNOSIS — G93.89 CEREBELLAR MASS: Primary | ICD-10-CM

## 2024-01-19 NOTE — TELEPHONE ENCOUNTER
S/W Pt & Pt spouse regarding multi-D scheduling.     Pt spouse states they saw pathology on myChart and wanted to discuss plan of care. Explained pathology would be discussed in depth at f/u appt with Dr. Wallace and plan of care with each discipline would be discussed at each appt.     Pt agreeable to scheduling on 1/24 at 12 pm and 1:30 pm with Ning Og (neuro-onc) and Levon (rad onc), respectively.     Directions to clinics and direct phone number provided for any further questions/concerns.     Pt spouse inquiring about order for PT with focus on vestibular therapy with approval from Dr. Wallace. Will route to Dr. Wallace staff.

## 2024-01-23 ENCOUNTER — TUMOR BOARD CONFERENCE (OUTPATIENT)
Dept: NEUROSURGERY | Facility: CLINIC | Age: 39
End: 2024-01-23

## 2024-01-23 ENCOUNTER — OFFICE VISIT (OUTPATIENT)
Dept: NEUROSURGERY | Facility: CLINIC | Age: 39
End: 2024-01-23
Payer: COMMERCIAL

## 2024-01-23 VITALS
BODY MASS INDEX: 28.03 KG/M2 | SYSTOLIC BLOOD PRESSURE: 145 MMHG | HEART RATE: 102 BPM | DIASTOLIC BLOOD PRESSURE: 94 MMHG | HEIGHT: 70 IN

## 2024-01-23 DIAGNOSIS — C71.9 GLIOMA: Primary | ICD-10-CM

## 2024-01-23 DIAGNOSIS — Z98.890 S/P CRANIOTOMY: ICD-10-CM

## 2024-01-23 DIAGNOSIS — Z98.890 S/P CRANIOTOMY: Primary | ICD-10-CM

## 2024-01-23 DIAGNOSIS — G93.89 BRAIN MASS: Primary | ICD-10-CM

## 2024-01-23 DIAGNOSIS — G93.89 BRAIN MASS: ICD-10-CM

## 2024-01-23 DIAGNOSIS — C71.6 MALIGNANT GLIOMA OF CEREBELLUM: Primary | ICD-10-CM

## 2024-01-23 PROCEDURE — 1160F RVW MEDS BY RX/DR IN RCRD: CPT | Mod: CPTII,S$GLB,, | Performed by: NEUROLOGICAL SURGERY

## 2024-01-23 PROCEDURE — 3044F HG A1C LEVEL LT 7.0%: CPT | Mod: CPTII,S$GLB,, | Performed by: NEUROLOGICAL SURGERY

## 2024-01-23 PROCEDURE — 99024 POSTOP FOLLOW-UP VISIT: CPT | Mod: S$GLB,,, | Performed by: NEUROLOGICAL SURGERY

## 2024-01-23 PROCEDURE — 3077F SYST BP >= 140 MM HG: CPT | Mod: CPTII,S$GLB,, | Performed by: NEUROLOGICAL SURGERY

## 2024-01-23 PROCEDURE — 1159F MED LIST DOCD IN RCRD: CPT | Mod: CPTII,S$GLB,, | Performed by: NEUROLOGICAL SURGERY

## 2024-01-23 PROCEDURE — 99999 PR PBB SHADOW E&M-EST. PATIENT-LVL III: CPT | Mod: PBBFAC,,, | Performed by: NEUROLOGICAL SURGERY

## 2024-01-23 PROCEDURE — 3080F DIAST BP >= 90 MM HG: CPT | Mod: CPTII,S$GLB,, | Performed by: NEUROLOGICAL SURGERY

## 2024-01-23 NOTE — PROGRESS NOTES
Subjective:   I, Vanessa Cuevas, attest that this documentation has been prepared under the direction and in the presence of Luis Wallace MD.     Patient ID: Sukumar Wiseman is a 38 y.o. male     Chief Complaint: No chief complaint on file.      HPI  MrSky Wiseman is a 38 y.o. gentleman who presents for 2 week PO follow up of MIS Right suboccipital craniotomy for tumor done on 1/4/2024.  Patient reports he began having episodes of imbalance more than a month ago.  He reports that he has not had any falls but feels as if he might.  The patient reports that he has not had any other symptoms.  He does report he has had intermittent headaches but believe that those are secondary to stress.  The patient was seen with his wife who also added to the history.  They both report that he has been otherwise healthy and has no significant medical problems.  The patient was seen by his primary care physician who ordered an MRI that showed a tumor involving his right cerebellar peduncle.MRI Brain (12/22)  Intra-axial mass centered about the right middle cerebellar peduncle with involvement of the right lateral aspect of the 4th ventricle and extending into the right cerebellopontine angle cistern.The mass measures approximately 2.4 x 2.4 cm in the axial plane and 2.3 cm in craniocaudal dimension.  No surrounding vasogenic edema.  No other masses.  No ventricular dilatation.  I have recommended performing a minimally invasive transcerebellar approach to this tumor and they wished to proceed.     Today the pt reports he is doing well overall in recovery. He endorses occasional nausea and dizziness. Denies headaches or pain to his incision site. Pt is visibly anxious throughout our visit today.    Review of Systems   Constitutional:  Negative for activity change, appetite change, fatigue, fever and unexpected weight change.   HENT:  Negative for facial swelling.    Eyes: Negative.    Respiratory: Negative.     Cardiovascular:  Negative.    Gastrointestinal:  Positive for nausea. Negative for diarrhea and vomiting.   Endocrine: Negative.    Genitourinary: Negative.    Musculoskeletal:  Negative for back pain, joint swelling, myalgias and neck pain.   Neurological:  Positive for dizziness. Negative for seizures, weakness, numbness and headaches.   Psychiatric/Behavioral:  The patient is nervous/anxious.         History reviewed. No pertinent past medical history.    Objective:      Vitals:    01/23/24 1400   BP: (!) 145/94   Pulse: 102      Physical Exam  Constitutional:       General: He is not in acute distress.     Appearance: Normal appearance.   HENT:      Head: Normocephalic and atraumatic.   Pulmonary:      Effort: Pulmonary effort is normal.   Musculoskeletal:      Cervical back: Neck supple.   Neurological:      Mental Status: He is alert and oriented to person, place, and time.      GCS: GCS eye subscore is 4. GCS verbal subscore is 5. GCS motor subscore is 6.      Cranial Nerves: No cranial nerve deficit.      Comments: Incision site is dry, clean, and intact.           IMAGING:  MRI Brain W WO Contrast (1/5/2024):  Postsurgery change in the posterior fossa from recent biopsy/partial resection of enhancing lesion centered about the right cerebellum.  Persistent mass effect in the posterior fossa with some crowding of 4th ventricle.  No overt hydrocephalus at this time but close follow-up is advised.     PATHOLOGY:  01-  1 and 2. 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      I, Dr. Luis Wallace, personally performed the services described in this documentation. All medical record entries made by the scribe, Vanessa Cuevas, were at my direction and in my presence.  I have reviewed the chart and agree that the record reflects my personal performance  and is accurate and complete. Luis Wallace MD. 01/23/2024    Assessment:       High grade glioma.     Plan:   I have personally reviewed the MRI brain with the pt which shows postsurgery change in the posterior fossa from recent biopsy/partial resection of enhancing lesion centered about the right cerebellum.  Persistent mass effect in the posterior fossa with some crowding of 4th ventricle.  No overt hydrocephalus at this time but close follow-up is advised.    I have reviewed the pathology report with the patient which is positive for high grade glioma. Recommended treatment includes radiation therapy with concomitant chemotherapy. Pt will be referred to Dr. Dos Santos, Radiation Oncology, and Dr. Og, Neuro Oncology.    I will schedule the patient for 3 month follow up with MRI brain.    A referral to Psychiatry will also be placed.

## 2024-01-23 NOTE — PATIENT INSTRUCTIONS
I have personally reviewed the MRI brain with the pt which shows postsurgery change in the posterior fossa from recent biopsy/partial resection of enhancing lesion centered about the right cerebellum.  Persistent mass effect in the posterior fossa with some crowding of 4th ventricle.  No overt hydrocephalus at this time but close follow-up is advised.    I have reviewed the pathology report with the patient which is positive for high grade glioma. Recommended treatment includes radiation therapy with concomitant chemotherapy. Pt will be referred to Dr. Dos Santos, Radiation Oncology, and Dr. Og, Neuro Oncology.    I will schedule the patient for 3 month follow up with MRI brain.    A referral to Psychiatry will also be placed.

## 2024-01-24 ENCOUNTER — PATIENT MESSAGE (OUTPATIENT)
Dept: NEUROSURGERY | Facility: CLINIC | Age: 39
End: 2024-01-24
Payer: COMMERCIAL

## 2024-01-24 ENCOUNTER — OFFICE VISIT (OUTPATIENT)
Dept: RADIATION ONCOLOGY | Facility: CLINIC | Age: 39
End: 2024-01-24
Payer: COMMERCIAL

## 2024-01-24 ENCOUNTER — OFFICE VISIT (OUTPATIENT)
Dept: NEUROLOGY | Facility: CLINIC | Age: 39
End: 2024-01-24
Payer: COMMERCIAL

## 2024-01-24 ENCOUNTER — TELEPHONE (OUTPATIENT)
Dept: NEUROLOGY | Facility: CLINIC | Age: 39
End: 2024-01-24

## 2024-01-24 ENCOUNTER — TELEPHONE (OUTPATIENT)
Dept: NEUROSURGERY | Facility: CLINIC | Age: 39
End: 2024-01-24
Payer: COMMERCIAL

## 2024-01-24 VITALS
WEIGHT: 193.69 LBS | DIASTOLIC BLOOD PRESSURE: 88 MMHG | SYSTOLIC BLOOD PRESSURE: 142 MMHG | BODY MASS INDEX: 27.73 KG/M2 | HEIGHT: 70 IN | HEART RATE: 108 BPM

## 2024-01-24 VITALS
HEIGHT: 70 IN | SYSTOLIC BLOOD PRESSURE: 138 MMHG | TEMPERATURE: 99 F | BODY MASS INDEX: 27.71 KG/M2 | HEART RATE: 112 BPM | OXYGEN SATURATION: 96 % | DIASTOLIC BLOOD PRESSURE: 88 MMHG | WEIGHT: 193.56 LBS

## 2024-01-24 DIAGNOSIS — C71.6 MALIGNANT GLIOMA OF CEREBELLUM: Primary | ICD-10-CM

## 2024-01-24 DIAGNOSIS — G93.6 CEREBRAL EDEMA: ICD-10-CM

## 2024-01-24 DIAGNOSIS — R26.89 IMBALANCE: ICD-10-CM

## 2024-01-24 DIAGNOSIS — F41.9 ANXIETY: ICD-10-CM

## 2024-01-24 PROCEDURE — 3075F SYST BP GE 130 - 139MM HG: CPT | Mod: CPTII,S$GLB,, | Performed by: RADIOLOGY

## 2024-01-24 PROCEDURE — 3079F DIAST BP 80-89 MM HG: CPT | Mod: CPTII,S$GLB,, | Performed by: RADIOLOGY

## 2024-01-24 PROCEDURE — 1160F RVW MEDS BY RX/DR IN RCRD: CPT | Mod: CPTII,S$GLB,, | Performed by: PSYCHIATRY & NEUROLOGY

## 2024-01-24 PROCEDURE — 3044F HG A1C LEVEL LT 7.0%: CPT | Mod: CPTII,S$GLB,, | Performed by: RADIOLOGY

## 2024-01-24 PROCEDURE — 3008F BODY MASS INDEX DOCD: CPT | Mod: CPTII,S$GLB,, | Performed by: RADIOLOGY

## 2024-01-24 PROCEDURE — 1159F MED LIST DOCD IN RCRD: CPT | Mod: CPTII,S$GLB,, | Performed by: RADIOLOGY

## 2024-01-24 PROCEDURE — 3008F BODY MASS INDEX DOCD: CPT | Mod: CPTII,S$GLB,, | Performed by: PSYCHIATRY & NEUROLOGY

## 2024-01-24 PROCEDURE — 3077F SYST BP >= 140 MM HG: CPT | Mod: CPTII,S$GLB,, | Performed by: PSYCHIATRY & NEUROLOGY

## 2024-01-24 PROCEDURE — 3079F DIAST BP 80-89 MM HG: CPT | Mod: CPTII,S$GLB,, | Performed by: PSYCHIATRY & NEUROLOGY

## 2024-01-24 PROCEDURE — 1111F DSCHRG MED/CURRENT MED MERGE: CPT | Mod: CPTII,S$GLB,, | Performed by: PSYCHIATRY & NEUROLOGY

## 2024-01-24 PROCEDURE — 99999 PR PBB SHADOW E&M-EST. PATIENT-LVL III: CPT | Mod: PBBFAC,,, | Performed by: PSYCHIATRY & NEUROLOGY

## 2024-01-24 PROCEDURE — 99999 PR PBB SHADOW E&M-EST. PATIENT-LVL III: CPT | Mod: PBBFAC,,, | Performed by: RADIOLOGY

## 2024-01-24 PROCEDURE — 3044F HG A1C LEVEL LT 7.0%: CPT | Mod: CPTII,S$GLB,, | Performed by: PSYCHIATRY & NEUROLOGY

## 2024-01-24 PROCEDURE — 99215 OFFICE O/P EST HI 40 MIN: CPT | Mod: S$GLB,,, | Performed by: PSYCHIATRY & NEUROLOGY

## 2024-01-24 PROCEDURE — 99205 OFFICE O/P NEW HI 60 MIN: CPT | Mod: S$GLB,,, | Performed by: RADIOLOGY

## 2024-01-24 PROCEDURE — 1111F DSCHRG MED/CURRENT MED MERGE: CPT | Mod: CPTII,S$GLB,, | Performed by: RADIOLOGY

## 2024-01-24 PROCEDURE — 1159F MED LIST DOCD IN RCRD: CPT | Mod: CPTII,S$GLB,, | Performed by: PSYCHIATRY & NEUROLOGY

## 2024-01-24 PROCEDURE — 99417 PROLNG OP E/M EACH 15 MIN: CPT | Mod: S$GLB,,, | Performed by: PSYCHIATRY & NEUROLOGY

## 2024-01-24 RX ORDER — ESCITALOPRAM OXALATE 10 MG/1
10 TABLET ORAL DAILY
Qty: 30 TABLET | Refills: 11 | Status: SHIPPED | OUTPATIENT
Start: 2024-01-24 | End: 2025-01-23

## 2024-01-24 NOTE — TELEPHONE ENCOUNTER
Informed pts wife that per  the MRIs have been sent through [facility] to [facility].      ----- Message from Barbara Figueroa RN sent at 1/23/2024  4:43 PM CST -----  Regarding: FW: pt wife    ----- Message -----  From: Nany Lipscomb  Sent: 1/23/2024   4:37 PM CST  To: Madison GARCIA Staff  Subject: pt wife                                          Name of Who is Calling:    Pt wife       What is the request in detail:  The pt needs his MRI from 1/5 power shared with [facility] gonz[facility]. They have been calling for 2 days in regards to this and need it expedited if possible. Please advise         Can the clinic reply by [facility]:    no      [facility]: [phone] (home) Telephone Information:  Mobile          [phone]

## 2024-01-24 NOTE — PROGRESS NOTES
Subjective:       Patient ID: Sukumar Wiseman is a 38 y.o. male.    Chief Complaint: brain tumor    HPI  Oncologic History  8/2023: began noticing worsening double vision  11/2023: began having imbalance, intermittent headaches  12/2023: MRI brain ordered by PCP for symptoms revealed a right cerebellar mass  1/4/2024: subtotal resection (Luis Wallace) of the mass with pathology consistent with high grade glioma (molecular studies pending)    Sukumar is recovering well since surgery. He has occasional nausea that is well controlled on his current medication regimen. He continues to have imbalance and is planning to start vestibular physical therapy soon. He has intermittent headaches treated with ibuprofen. He developed word finding difficulty after surgery; increased dexamethasone to 4mg daily helped with these symptoms. He initially had slurred speech after surgery but feels this is improving. He notices mild difficulty with fine motor tasks with his right hand such as writing and navigating his phone. He is understandably anxious about his diagnosis. He presents with his wife, who helps provide the history.    Past Medical History:   Diagnosis Date    Cancer 12/21/2023    Headache     Hypertension     145/93,  not medicated      Current Outpatient Medications   Medication Sig Dispense Refill    dexAMETHasone (DECADRON) 4 MG Tab --a single 2mg tablet every 6 hours for 3 days --a single 2mg tablet every 8 hours for 3 days --a single 2mg tablet every 12 hours for 4 days --a single 2mg tablet every morning for 4 days (Patient taking differently: --a single 2mg tablet every 6 hours for 3 days --a single 2mg tablet every 8 hours for 3 days --a single 2mg tablet every 12 hours for 4 days --a single 2mg tablet every morning for 4 days    Pt taking once daily 4mg as of 1.24.24) 33 tablet 0    ondansetron (ZOFRAN) 4 MG tablet Take 2 tablets (8 mg total) by mouth every 6 (six) hours as needed for Nausea. 84 tablet 0     EScitalopram oxalate (LEXAPRO) 10 MG tablet Take 1 tablet (10 mg total) by mouth once daily. 30 tablet 11     No current facility-administered medications for this visit.     Facility-Administered Medications Ordered in Other Visits   Medication Dose Route Frequency Provider Last Rate Last Admin    mupirocin 2 % ointment   Nasal On Call Procedure Cesar Mayberry MD   Given at 01/04/24 1058      Past Surgical History:   Procedure Laterality Date    SUBOCCIPITAL CRANIOTOMY Right 01/04/2024    Procedure: CRANIOTOMY, SUBOCCIPITAL MIS;  Surgeon: Luis Wallace MD;  Location: [facility] OR [address] [place] [address];  Service: Neurosurgery;  Laterality: Right;      Family History   Problem Relation Age of Onset    Hypertension Mother     Hyperlipidemia Mother     Hypertension Father     Colon cancer Maternal Grandfather     Cancer Maternal Grandfather       Social History     Tobacco Use    Smoking status: Never   Substance Use Topics    Alcohol use: Not Currently    Drug use: Never   ; has two children ages 12 and 8       Review of Systems  KPS: 90      Objective:      Vitals:    01/24/24 1154   BP: (!) 142/88   Pulse: 108        NEUROLOGICAL EXAMINATION:     MENTAL STATUS   Attention: normal. Concentration: normal.   Speech: (Mild slurring; no detectable aphasia)  Level of consciousness: alertPraxis: normal     CRANIAL NERVES   Cranial nerves II through XII intact.        With exception: end-gaze nystagmus in the direction of gaze worse with rightward gaze. No nystagmus with vertical gaze or neutral gaze.     MOTOR EXAM   Muscle bulk: normal  Overall muscle tone: normal    Strength   Strength 5/5 throughout.     REFLEXES     Reflexes   Right brachioradialis: 2+  Left brachioradialis: 2+  Right biceps: 2+  Left biceps: 2+  Right patellar: 2+  Left patellar: 2+  Right achilles: 2+  Left achilles: 2+       No pathological reflexes noted     SENSORY EXAM   Light touch normal.     GAIT AND COORDINATION     Gait  Gait: normal      Coordination   Finger to nose coordination: normal       No dysmetria with finger to nose, fingertip to thumb tapping.        MRI of the brain from 1/5/2024 was personally visualized and compared to priors.  Postsurgery change in the posterior fossa from recent biopsy/partial resection of enhancing lesion centered about the right cerebellum.  Persistent mass effect in the posterior fossa with some crowding of 4th ventricle.  No overt hydrocephalus at this time but close follow-up is advised.       Assessment:       Problem List Items Addressed This Visit          Oncology    Malignant glioma of cerebellum - Primary     Other Visit Diagnoses       Anxiety        Relevant Medications    EScitalopram oxalate (LEXAPRO) 10 MG tablet    Other Relevant Orders    Ambulatory referral/consult to Hematology/Oncology/Psychology    Cerebral edema        Imbalance                Plan:       Sukumar Wiseman is a 38 y.o. male with a cerebellar high grade glioma (molecular studies pending) s/p subtotal resection 1/4/2024 presenting for treatment recommendations. Tumor diagnosis was heralded by imbalance. Current symptoms include imbalance and dizziness.    High grade glioma  Pending further molecular characterization. Discussed that treatment would include 6 weeks of radiation +/- concomitant chemotherapy, followed by chemotherapy alone for 6-12 months. Discussed the side effects and monitoring schedule of temozolomide. We will follow up in 1 week virtually to discuss final pathology and definitive treatment plan.    Cerebral edema  Currently on dexamethasone 4mg qAM. Decrease dexamethasone to 2mg daily x1 week; if symptoms stable, continue wean to 1mg daily x1 week, then stop.    Imbalance/dizziness  Plans to start vestibular PT    Anxiety  Interested in trying Lexapro; will prescribe. He is also amenable to oncology psychology referral.      75 minutes of total time spent on the encounter, which includes face to face time and  non-face to face time preparing to see the patient (eg, review of tests), Obtaining and/or reviewing separately obtained history, Documenting clinical information in the electronic or other health record, Independently interpreting results (not separately reported) and communicating results to the patient/family/caregiver, or Care coordination (not separately reported).     Karen Og MD  Department of Neurology  Neuro-Oncology, Movement Disorders

## 2024-01-24 NOTE — PROGRESS NOTES
1/24/2024    Radiation Oncology Consultation    Assessment   This is a 38 y.o. male with Right cerebellar peduncle at least WHO grade 3 glioma s/p STR by Dr. Wallace 1/4/24. Neuro-onc gene panel pending for final diagnosis. He is referred for consideration of adjuvant radiation therapy.    I reviewed the natural history and treatment options for high grade glioma with the patient and his family. Final dose/fractionation will depend on results of Neuro-Onc gene panel. I recommend treating the resection cavity + margin to 57-60 Gy in 30 fractions with a wider margin around the cavity going to lower dose of 50 Gy in 30 fractions. I recommend using IMRT to limit dose to uninvolved brain, brainstem, and cochlea. Potential side effects of radiation to the brain were reviewed including short term fatigue, focal hair loss, and inflammation that may cause temporary worsening of neurologic deficits. Potential long-term side effects of radiation including impaired hearing, short-term memory, and attention were reviewed. At the end of our discussion, he was in agreement with proceeding with the recommended treatment.        Plan   1) Follow up results of pathology NGS to finalize diagnosis and treatment plan.   2) Schedule MRI Brain and CT Simulation for radiation treatment planning.   3) Oncology Social Worker consult to discuss options for staying local such at [facility] or [facility].        CHIEF COMPLAINT: Right cerebellar high grade glioma    HPI/Focused ROS: Mr. Wiseman is a 39 y/o male who presented to his PCP noting ~1 month history of gait imbalance. MRI Brain 12/21/23 demonstrated a 2.4 cm heterogeneously enhancing mass involving the Right middle cerebellar peduncle. He was referred to Dr. Wallace who performed resection on 1/4/24 with pathology revealing at least WHO grade 3 high grade glioma pending Neuro-Onc gene panel for final diagnosis. Post-op MRI Brain 1/5/24 demonstrated subtotal resection. He is referred for  consideration of adjuvant therapy.     In clinic today, he is accompanied by his wife. His gait imbalance has improved since surgery. He has developed some word-finding difficulty and stuttered speech since surgery; they think possibly was getting a little worse with steroid taper and when he is anxious. He is currently down to 2 mg bid of dex with taper instructions provided earlier by Dr. Og. Denies focal numbness or weakness.       Is the patient female between ages 15-55:  no    Does the patient have a CIED:  no    Prior Radiation History: None      Past Medical History:   Diagnosis Date    Cancer 12/21/2023    Headache     Hypertension     145/93,  not medicated       Past Surgical History:   Procedure Laterality Date    SUBOCCIPITAL CRANIOTOMY Right 01/04/2024    Procedure: CRANIOTOMY, SUBOCCIPITAL MIS;  Surgeon: Luis Wallace MD;  Location: [facility] OR [address] [place] [address];  Service: Neurosurgery;  Laterality: Right;       Social History     Tobacco Use    Smoking status: Never   Substance Use Topics    Alcohol use: Not Currently    Drug use: Never       Cancer-related family history includes Cancer in his maternal grandfather.    Current Outpatient Medications on File Prior to Visit   Medication Sig Dispense Refill    dexAMETHasone (DECADRON) 4 MG Tab --a single 2mg tablet every 6 hours for 3 days --a single 2mg tablet every 8 hours for 3 days --a single 2mg tablet every 12 hours for 4 days --a single 2mg tablet every morning for 4 days (Patient taking differently: --a single 2mg tablet every 6 hours for 3 days --a single 2mg tablet every 8 hours for 3 days --a single 2mg tablet every 12 hours for 4 days --a single 2mg tablet every morning for 4 days    Pt taking once daily 4mg as of 1.24.24) 33 tablet 0    ondansetron (ZOFRAN) 4 MG tablet Take 2 tablets (8 mg total) by mouth every 6 (six) hours as needed for Nausea. 84 tablet 0     Current Facility-Administered Medications on File Prior to Visit   Medication Dose  "Route Frequency Provider Last Rate Last Admin    mupirocin 2 % ointment   Nasal On Call Procedure Cesar Mayberry MD   Given at 01/04/24 1058       Review of patient's allergies indicates:  No Known Allergies      Vital Signs: /88 (BP Location: Right arm, Patient Position: Sitting, BP Method: Large (Automatic))   Pulse (!) 112   Temp 98.7 °F (37.1 °C)   Ht 5' 10" (1.778 m)   Wt 87.8 kg (193 lb 9 oz)   SpO2 96%   BMI 27.77 kg/m²     ECOG Performance Status: (0) Fully active, able to carry on all predisease performance without restriction    Physical Exam  Constitutional:       Appearance: Normal appearance.   HENT:      Head: Normocephalic and atraumatic.      Comments: Right suboccipital scar without erythema or drainage  Eyes:      General: No scleral icterus.     Extraocular Movements: Extraocular movements intact.   Pulmonary:      Effort: No accessory muscle usage or respiratory distress.   Musculoskeletal:      Cervical back: Normal range of motion.   Neurological:      Mental Status: He is alert and oriented to person, place, and time.      Comments: Mild word-finding difficulty and slightly stuttered speech   Psychiatric:         Mood and Affect: Mood and affect normal.         Judgment: Judgment normal.          Labs:    Imaging: I have personally reviewed the patient's available images and reports and summarized pertinent findings above in HPI.     Pathology: I have personally reviewed the patient's available pathology and summarized pertinent findings above in HPI.       - Thank you for allowing me to participate in the care of your patient.    Pasha Dos Santos MD, PhD    "

## 2024-01-25 ENCOUNTER — PATIENT MESSAGE (OUTPATIENT)
Dept: NEUROLOGY | Facility: CLINIC | Age: 39
End: 2024-01-25
Payer: COMMERCIAL

## 2024-01-25 ENCOUNTER — TELEPHONE (OUTPATIENT)
Dept: NEUROLOGY | Facility: CLINIC | Age: 39
End: 2024-01-25
Payer: COMMERCIAL

## 2024-01-25 ENCOUNTER — PATIENT MESSAGE (OUTPATIENT)
Dept: NEUROSURGERY | Facility: CLINIC | Age: 39
End: 2024-01-25
Payer: COMMERCIAL

## 2024-01-25 NOTE — PROGRESS NOTES
[facility]   NEURO-ONCOLOGICAL MULTIDISCIPLINARY TUMOR BOARD  PATIENT REVIEW FORM  ____________________________________________________________  Vanessa MACEDO, attest that this documentation has been prepared under the direction and in the presence of Mandi Moreno PA-C.     PATIENT ID: Sukumar Wiseman is a 38 y.o. male  DATE: 01/25/2024    This patient's relevant clinical data was reviewed before the multidisciplinary tumor board in order to establish an optimum treatment plan in this patient. The patient's clinical data were presented at our Multi-Disciplinary Tumor Board which included representatives of Neurosurgery, Neuro-Radiology, Neuro-Pathology, Radiation Oncology, Medical Oncology, Palliative Medicine.     PRESENTER:   Dr. Wallace    PATIENT SUMMARY:   The patient is a 38 y.o.  male presented to clinic following episodes of imbalance & intermittent headaches. Imaging obtained by PCP revealed cerebellar mass.       12/22/23 - MRI brain shows intra-axial mass in R middle cerebellar peduncle   1/4/24 - crani/Dr Wallace    Additionally, we reviewed previous medical and familial history of present illness, and recent lab results along with all available histopathologic and imaging studies.    IMAGING:   MRI Brain W WO Contrast (1/5/2024):  Postsurgery change in the posterior fossa from recent biopsy/partial resection of enhancing lesion centered about the right cerebellum.  Persistent mass effect in the posterior fossa with some crowding of 4th ventricle.  No overt hydrocephalus at this time but close follow-up is advised.     PATHOLOGY:  1-4-24 Brain, R cerebellar peduncle, crani w/ resection:   - High-grade glioma, IDH-Wildtype    At least CNS WHO Grade III  - PENDING MOLECULAR ANALYSIS    BOARD RECOMMENDATIONS:   The tumor board considered available treatment options and made the following recommendations: Patient is scheduled to follow up with Dr. Wallace. He will also follow up with Ning Og and  Levon on 1/24.    National site-specific guidelines were discussed with respect to the case.     Tumor board is a meeting of clinicians from various specialty areas who evaluate and discuss patients for whom a multidisciplinary approach is being considered. Final determinations in the plan of care are those of the provider(s). The responsibility for follow up recommendations given during tumor board is that of the provider.     CONSULT NEEDED:     [] Neurosurgery    [] Hem/Onc    [] Rad/Onc         [] Endocrinology     [] Neuro-ophthalmology    [] Palliative Medicine      []  Other:       PRESENTATION AT CANCER CONFERENCE:         [] Prospective    [x] Retrospective     [] Follow-Up          [] Eligible for clinical trial/Clinical trial status:

## 2024-01-25 NOTE — TELEPHONE ENCOUNTER
Incoming call from Pt spouse regarding questions about IDH1 negative status and what that means for tumor type. Wanted to know if IDH2 was being tested with further molecular studies, and if there was a chance grade could be lower than originally thought.     Advised molecular studies are still pending for final pathologic diagnosis.    Scheduled for VV to review pathology on 2/1 at 11 am.     Will route to Dr. Og to further advise.

## 2024-01-25 NOTE — TELEPHONE ENCOUNTER
----- Message from Julian Parker MA sent at 1/25/2024  8:09 AM CST -----  Regarding: pt advice  Contact: [phone]  Pt is calling to speak with someone in provider office is asking for a return call please call pt at  [phone]

## 2024-01-29 ENCOUNTER — DOCUMENTATION ONLY (OUTPATIENT)
Dept: HEMATOLOGY/ONCOLOGY | Facility: CLINIC | Age: 39
End: 2024-01-29
Payer: COMMERCIAL

## 2024-01-29 NOTE — PROGRESS NOTES
ZAYRA assisting primary [facility] [place]. Received request for [facility] information from Estrella Patel RN.     SW called patient and spouse and information regarding [facility] accommodations provided. Dates not yet confirmed for treatment aside from pt needing 30 treatments (6 weeks of Radiation). Spouse inquiring about their children (8 and 11 yo) staying at the  and their leaving premises options to visit children back home throughout tx. Reassured her SW would call  to further discuss.     ZAYRA called [facility] and spoke with Brijesh who states children are not able to stay at  due to health safety concerns for other patients. Brijesh also reports patient and spouse may leave overnight to visit children back home but only for 2-3 nights maximum at a time.     Call back made to spouse Ryan to report the above information and she wishes to move forward with referral. Reassured her that SW will notify RN of request and that referral cannot be placed until dates for tx are confirmed. Encouraged spouse to call [facility] their SW once tx dates are confirmed to place referral ASAP. Spouse v/u and thanks SW for assistance. ZAYRA also provides spouse assigned ZAYRA Del Castillo's contact information for any future needs.     Update sent to Estrella Patel RN via in basket.

## 2024-02-01 ENCOUNTER — OFFICE VISIT (OUTPATIENT)
Dept: NEUROLOGY | Facility: CLINIC | Age: 39
End: 2024-02-01
Payer: COMMERCIAL

## 2024-02-01 DIAGNOSIS — G93.6 CEREBRAL EDEMA: ICD-10-CM

## 2024-02-01 DIAGNOSIS — R26.89 IMBALANCE: ICD-10-CM

## 2024-02-01 DIAGNOSIS — C71.9 DIFFUSE MIDLINE GLIOMA, H3 K27M MUTANT: Primary | ICD-10-CM

## 2024-02-01 DIAGNOSIS — F41.9 ANXIETY: ICD-10-CM

## 2024-02-01 DIAGNOSIS — R42 DIZZINESS: ICD-10-CM

## 2024-02-01 PROBLEM — C71.6: Status: RESOLVED | Noted: 2024-01-24 | Resolved: 2024-02-01

## 2024-02-01 PROCEDURE — 1111F DSCHRG MED/CURRENT MED MERGE: CPT | Mod: CPTII,95,, | Performed by: PSYCHIATRY & NEUROLOGY

## 2024-02-01 PROCEDURE — 1159F MED LIST DOCD IN RCRD: CPT | Mod: CPTII,95,, | Performed by: PSYCHIATRY & NEUROLOGY

## 2024-02-01 PROCEDURE — 1160F RVW MEDS BY RX/DR IN RCRD: CPT | Mod: CPTII,95,, | Performed by: PSYCHIATRY & NEUROLOGY

## 2024-02-01 PROCEDURE — 3044F HG A1C LEVEL LT 7.0%: CPT | Mod: CPTII,95,, | Performed by: PSYCHIATRY & NEUROLOGY

## 2024-02-01 PROCEDURE — 99214 OFFICE O/P EST MOD 30 MIN: CPT | Mod: 95,,, | Performed by: PSYCHIATRY & NEUROLOGY

## 2024-02-01 NOTE — PROGRESS NOTES
Subjective:       Patient ID: Sukumar Wiseman is a 38 y.o. male.    Chief Complaint: brain tumor    HPI  Oncologic History  8/2023: began noticing worsening double vision  11/2023: began having imbalance, intermittent headaches  12/2023: MRI brain ordered by PCP for symptoms revealed a right cerebellar mass  1/4/2024: subtotal resection (Luis Wallace) of the mass with pathology consistent with high grade glioma; molecular studies confirmed diagnosis of diffuse midline glioma, [id number], WHO grade 4    Sukumar is on his way home from [place] after a consultation with [facility]. His symptoms are stable. We are meeting today to discuss his final pathology.    Past Medical History:   Diagnosis Date    Cancer 12/21/2023    Headache     Hypertension     145/93,  not medicated      Current Outpatient Medications   Medication Sig Dispense Refill    dexAMETHasone (DECADRON) 4 MG Tab --a single 2mg tablet every 6 hours for 3 days --a single 2mg tablet every 8 hours for 3 days --a single 2mg tablet every 12 hours for 4 days --a single 2mg tablet every morning for 4 days (Patient taking differently: --a single 2mg tablet every 6 hours for 3 days --a single 2mg tablet every 8 hours for 3 days --a single 2mg tablet every 12 hours for 4 days --a single 2mg tablet every morning for 4 days    Pt taking once daily 4mg as of 1.24.24) 33 tablet 0    EScitalopram oxalate (LEXAPRO) 10 MG tablet Take 1 tablet (10 mg total) by mouth once daily. 30 tablet 11     No current facility-administered medications for this visit.     Facility-Administered Medications Ordered in Other Visits   Medication Dose Route Frequency Provider Last Rate Last Admin    mupirocin 2 % ointment   Nasal On Call Procedure Cesar Mayberry MD   Given at 01/04/24 1059      Past Surgical History:   Procedure Laterality Date    SUBOCCIPITAL CRANIOTOMY Right 01/04/2024    Procedure: CRANIOTOMY, SUBOCCIPITAL MIS;  Surgeon: Luis Wallace MD;  Location: [facility] OR [address] [place] [address];   Service: Neurosurgery;  Laterality: Right;      Family History   Problem Relation Age of Onset    Hypertension Mother     Hyperlipidemia Mother     Hypertension Father     Colon cancer Maternal Grandfather     Cancer Maternal Grandfather       Social History     Tobacco Use    Smoking status: Never   Substance Use Topics    Alcohol use: Not Currently    Drug use: Never   ; has two children ages 12 and 8       Review of Systems  KPS: 90      Objective:      There were no vitals filed for this visit.   Telehealth visit    (Prior)  NEUROLOGICAL EXAMINATION:     MENTAL STATUS   Attention: normal. Concentration: normal.   Speech: (Mild slurring; no detectable aphasia)  Level of consciousness: alertPraxis: normal     CRANIAL NERVES   Cranial nerves II through XII intact.        With exception: end-gaze nystagmus in the direction of gaze worse with rightward gaze. No nystagmus with vertical gaze or neutral gaze.     MOTOR EXAM   Muscle bulk: normal  Overall muscle tone: normal    Strength   Strength 5/5 throughout.     REFLEXES     Reflexes   Right brachioradialis: 2+  Left brachioradialis: 2+  Right biceps: 2+  Left biceps: 2+  Right patellar: 2+  Left patellar: 2+  Right achilles: 2+  Left achilles: 2+       No pathological reflexes noted     SENSORY EXAM   Light touch normal.     GAIT AND COORDINATION     Gait  Gait: normal     Coordination   Finger to nose coordination: normal       No dysmetria with finger to nose, fingertip to thumb tapping.        MRI of the brain from 1/5/2024 was personally visualized and compared to priors.  Postsurgery change in the posterior fossa from recent biopsy/partial resection of enhancing lesion centered about the right cerebellum.  Persistent mass effect in the posterior fossa with some crowding of 4th ventricle.  No overt hydrocephalus at this time but close follow-up is advised.       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 male with a cerebellar diffuse midline glioma, [id number], WHO grade 4 s/p subtotal resection 1/4/2024 presenting for treatment recommendations. Tumor diagnosis was heralded by imbalance. Current symptoms include imbalance and dizziness.    Diffuse midline glioma, [id number], WHO grade 4  Discussed this diagnosis in detail. The standard treatment for this is 6 weeks of radiation. They would like to pursue radiation at [facility]. I discussed with him that I currently have a trial for his tumor type, [id number]. They are interested in hearing more about this. My research coordinator will reach out to them to discuss the logistics in more detail.    Cerebral edema  Continue current dexamethasone wean    Imbalance/dizziness  Plans to start vestibular PT    Anxiety  Prescribed lexapro and referred to onc/psych at last visit.          The patient location is: in the car in [place]  The chief complaint leading to consultation is: brain tumor    Visit type: audiovisual    Face to Face time with patient: 12 minutes  30 minutes of total time spent on the encounter, which includes face to face time and non-face to face time preparing to see the patient (eg, review of tests), Obtaining and/or reviewing separately obtained history, Documenting clinical information in the electronic or other health record, Independently interpreting results (not separately reported) and communicating results to the patient/family/caregiver, or Care coordination (not separately reported).         Each patient to whom he or she provides medical services by telemedicine is:  (1) informed of the relationship between the physician and patient and the respective role of any other health care provider with respect to management of the patient; and (2) notified that he or she may  decline to receive medical services by telemedicine and may withdraw from such care at any time.    Notes:       Karen Og MD  Department of Neurology  Neuro-Oncology, Movement Disorders

## 2024-02-02 ENCOUNTER — TELEPHONE (OUTPATIENT)
Dept: HEMATOLOGY/ONCOLOGY | Facility: CLINIC | Age: 39
End: 2024-02-02
Payer: COMMERCIAL

## 2024-02-05 ENCOUNTER — PATIENT MESSAGE (OUTPATIENT)
Dept: RESEARCH | Facility: HOSPITAL | Age: 39
End: 2024-02-05
Payer: COMMERCIAL

## 2024-02-08 LAB
COMMENT: NORMAL
FINAL PATHOLOGIC DIAGNOSIS: NORMAL
FROZEN SECTION DIAGNOSIS: NORMAL
GROSS: NORMAL
Lab: NORMAL
MICROSCOPIC EXAM: NORMAL
SUPPLEMENTAL DIAGNOSIS: NORMAL

## 2024-02-14 ENCOUNTER — TELEPHONE (OUTPATIENT)
Dept: NEUROSURGERY | Facility: CLINIC | Age: 39
End: 2024-02-14
Payer: COMMERCIAL

## 2024-03-22 ENCOUNTER — TELEPHONE (OUTPATIENT)
Dept: NEUROSURGERY | Facility: CLINIC | Age: 39
End: 2024-03-22
Payer: COMMERCIAL

## 2024-04-04 PROBLEM — I47.10 SVT (SUPRAVENTRICULAR TACHYCARDIA): Status: ACTIVE | Noted: 2024-04-04

## 2024-04-04 PROBLEM — K21.9 GASTROESOPHAGEAL REFLUX DISEASE WITHOUT ESOPHAGITIS: Status: ACTIVE | Noted: 2024-04-04

## 2024-04-04 PROBLEM — R13.19 ESOPHAGEAL DYSPHAGIA: Status: ACTIVE | Noted: 2024-04-04

## 2024-04-09 ENCOUNTER — TELEPHONE (OUTPATIENT)
Dept: SURGERY | Facility: CLINIC | Age: 39
End: 2024-04-09
Payer: COMMERCIAL

## 2024-04-18 ENCOUNTER — OFFICE VISIT (OUTPATIENT)
Dept: SURGERY | Facility: CLINIC | Age: 39
End: 2024-04-18
Payer: COMMERCIAL

## 2024-04-18 VITALS
HEIGHT: 70 IN | WEIGHT: 208 LBS | SYSTOLIC BLOOD PRESSURE: 137 MMHG | RESPIRATION RATE: 20 BRPM | BODY MASS INDEX: 29.78 KG/M2 | DIASTOLIC BLOOD PRESSURE: 96 MMHG | HEART RATE: 88 BPM | OXYGEN SATURATION: 95 %

## 2024-04-18 DIAGNOSIS — R13.19 ESOPHAGEAL DYSPHAGIA: Primary | ICD-10-CM

## 2024-04-18 DIAGNOSIS — K21.9 GASTROESOPHAGEAL REFLUX DISEASE WITHOUT ESOPHAGITIS: ICD-10-CM

## 2024-04-18 PROCEDURE — 3080F DIAST BP >= 90 MM HG: CPT | Mod: CPTII,S$GLB,, | Performed by: STUDENT IN AN ORGANIZED HEALTH CARE EDUCATION/TRAINING PROGRAM

## 2024-04-18 PROCEDURE — 1159F MED LIST DOCD IN RCRD: CPT | Mod: CPTII,S$GLB,, | Performed by: STUDENT IN AN ORGANIZED HEALTH CARE EDUCATION/TRAINING PROGRAM

## 2024-04-18 PROCEDURE — 3044F HG A1C LEVEL LT 7.0%: CPT | Mod: CPTII,S$GLB,, | Performed by: STUDENT IN AN ORGANIZED HEALTH CARE EDUCATION/TRAINING PROGRAM

## 2024-04-18 PROCEDURE — 3008F BODY MASS INDEX DOCD: CPT | Mod: CPTII,S$GLB,, | Performed by: STUDENT IN AN ORGANIZED HEALTH CARE EDUCATION/TRAINING PROGRAM

## 2024-04-18 PROCEDURE — 3075F SYST BP GE 130 - 139MM HG: CPT | Mod: CPTII,S$GLB,, | Performed by: STUDENT IN AN ORGANIZED HEALTH CARE EDUCATION/TRAINING PROGRAM

## 2024-04-18 PROCEDURE — 99999 PR PBB SHADOW E&M-EST. PATIENT-LVL IV: CPT | Mod: PBBFAC,,, | Performed by: STUDENT IN AN ORGANIZED HEALTH CARE EDUCATION/TRAINING PROGRAM

## 2024-04-18 PROCEDURE — 99203 OFFICE O/P NEW LOW 30 MIN: CPT | Mod: S$GLB,,, | Performed by: STUDENT IN AN ORGANIZED HEALTH CARE EDUCATION/TRAINING PROGRAM

## 2024-04-18 RX ORDER — LORAZEPAM 0.5 MG/1
0.5 TABLET ORAL DAILY PRN
COMMUNITY
Start: 2024-02-16

## 2024-04-18 RX ORDER — PROMETHAZINE HYDROCHLORIDE 12.5 MG/1
12.5 TABLET ORAL EVERY 4 HOURS PRN
COMMUNITY
Start: 2024-02-16

## 2024-04-18 RX ORDER — ACETAMINOPHEN 500 MG
500 TABLET ORAL EVERY 4 HOURS PRN
COMMUNITY

## 2024-04-18 RX ORDER — TEMOZOLOMIDE 20 MG/1
100 CAPSULE ORAL DAILY
COMMUNITY
Start: 2024-02-15 | End: 2024-04-18 | Stop reason: ALTCHOICE

## 2024-04-18 RX ORDER — ONDANSETRON HYDROCHLORIDE 8 MG/1
8 TABLET, FILM COATED ORAL EVERY 8 HOURS PRN
COMMUNITY
Start: 2024-02-10

## 2024-04-18 RX ORDER — TEMOZOLOMIDE 140 MG/1
100 CAPSULE ORAL DAILY
COMMUNITY
Start: 2024-02-15 | End: 2024-04-18 | Stop reason: ALTCHOICE

## 2024-04-18 RX ORDER — ONDANSETRON 4 MG/1
4 TABLET, ORALLY DISINTEGRATING ORAL EVERY 6 HOURS PRN
COMMUNITY
End: 2024-04-18 | Stop reason: DRUGHIGH

## 2024-04-18 NOTE — H&P
[facility]  General Surgery Clinic H&P      Consult: Dysphagia  Consulting Service: Dr. Nicholas   Chief Complaint: food getting stuck    HPI: Pt is a 38 y.o. male who presents with 6 month history of dysphagia.  Recently dx with cerebellar glioma s/p partial resection and photon radiation, but says symptoms were there before diagnosis.  Says food feels stuck below the breast bone and will sometimes regurgitate food.      PMH:   Past Medical History:   Diagnosis Date    Cancer 12/21/2023    Headache     Hypertension     145/93,  not medicated     PSH:   Past Surgical History:   Procedure Laterality Date    SUBOCCIPITAL CRANIOTOMY Right 01/04/2024    Procedure: CRANIOTOMY, SUBOCCIPITAL MIS;  Surgeon: Luis Wallace MD;  Location: [facility] OR [address] [place] [address];  Service: Neurosurgery;  Laterality: Right;     Meds: See medication list;  No anticoagulation  ALL: Patient has no known allergies.  FHX: non contributory    SOC:   Social History     Socioeconomic History    Marital status:    Tobacco Use    Smoking status: Never     Passive exposure: Never    Smokeless tobacco: Never   Substance and Sexual Activity    Alcohol use: Not Currently    Drug use: Never    Sexual activity: Yes     Partners: Female     Birth control/protection: None     Social Determinants of Health     Financial Resource Strain: Low Risk  (12/25/2023)    Overall Financial Resource Strain (CARDIA)     Difficulty of Paying Living Expenses: Not hard at all   Food Insecurity: No Food Insecurity (12/25/2023)    Hunger Vital Sign     Worried About Running Out of Food in the Last Year: Never true     Ran Out of Food in the Last Year: Never true   Transportation Needs: No Transportation Needs (12/25/2023)    PRAPARE - Transportation     Lack of Transportation (Medical): No     Lack of Transportation (Non-Medical): No   Physical Activity: Insufficiently Active (12/25/2023)    Exercise Vital Sign     Days of Exercise per Week: 3 days     Minutes of Exercise  "per Session: 30 min   Stress: Stress Concern Present (12/25/2023)    [place] [employer] - Occupational Stress Questionnaire     Feeling of Stress : Very much   Social Connections: Unknown (12/25/2023)    Social Connection and Isolation Panel [NHANES]     Frequency of Communication with Friends and Family: More than three times a week     Frequency of Social Gatherings with Friends and Family: Once a week     Active Member of Clubs or Organizations: No     Attends Club or Organization Meetings: Patient declined     Marital Status:    Housing Stability: Low Risk  (12/25/2023)    Housing Stability Vital Sign     Unable to Pay for Housing in the Last Year: No     Number of Places Lived in the Last Year: 1     Unstable Housing in the Last Year: No     ROS: Review of Systems   Constitutional:  Negative for chills, fever, malaise/fatigue and weight loss.   Respiratory:  Negative for cough.    Cardiovascular:  Negative for chest pain.   Gastrointestinal:  Positive for heartburn. Negative for abdominal pain, blood in stool, constipation, diarrhea, melena, nausea and vomiting.   All other systems reviewed and are negative.      Physical Exam:  BP (!) 137/96   Pulse 88   Resp 20   Ht 5' 10" (1.778 m)   Wt 94.3 kg (208 lb 0.1 oz)   SpO2 95%   BMI 29.85 kg/m²   Physical Exam  Constitutional:       General: He is not in acute distress.     Appearance: He is not ill-appearing or toxic-appearing.   Cardiovascular:      Rate and Rhythm: Normal rate and regular rhythm.   Pulmonary:      Effort: Pulmonary effort is normal. No respiratory distress.   Abdominal:      General: There is no distension.      Palpations: Abdomen is soft.      Tenderness: There is no abdominal tenderness. There is no guarding or rebound.   Skin:     General: Skin is warm and dry.      Capillary Refill: Capillary refill takes less than 2 seconds.   Neurological:      Mental Status: He is alert.             A/P: Pt is a 38 " y.o. male who presents with dysphagia and regurgitation  -Esophagram  -Continue protonix   -Attempt to get imaging prior to next oncology appointment.  Will require EGD with dilation if stricture present      Gail Chandler MD  [phone]

## 2024-04-25 ENCOUNTER — PATIENT MESSAGE (OUTPATIENT)
Dept: NEUROSURGERY | Facility: CLINIC | Age: 39
End: 2024-04-25
Payer: COMMERCIAL

## 2024-05-03 ENCOUNTER — PATIENT MESSAGE (OUTPATIENT)
Dept: SURGERY | Facility: CLINIC | Age: 39
End: 2024-05-03
Payer: COMMERCIAL

## 2024-05-03 ENCOUNTER — HOSPITAL ENCOUNTER (OUTPATIENT)
Dept: RADIOLOGY | Facility: HOSPITAL | Age: 39
Discharge: HOME | End: 2024-05-03
Attending: STUDENT IN AN ORGANIZED HEALTH CARE EDUCATION/TRAINING PROGRAM
Payer: COMMERCIAL

## 2024-05-03 DIAGNOSIS — K21.9 GASTROESOPHAGEAL REFLUX DISEASE WITHOUT ESOPHAGITIS: ICD-10-CM

## 2024-05-03 DIAGNOSIS — R13.19 ESOPHAGEAL DYSPHAGIA: ICD-10-CM

## 2024-05-03 PROCEDURE — 74220 X-RAY XM ESOPHAGUS 1CNTRST: CPT | Mod: TC

## 2024-05-03 PROCEDURE — 25500020 PHARM REV CODE 255: Performed by: STUDENT IN AN ORGANIZED HEALTH CARE EDUCATION/TRAINING PROGRAM

## 2024-05-03 PROCEDURE — A9698 NON-RAD CONTRAST MATERIALNOC: HCPCS | Performed by: STUDENT IN AN ORGANIZED HEALTH CARE EDUCATION/TRAINING PROGRAM

## 2024-05-03 RX ADMIN — BARIUM SULFATE 135 ML: 980 POWDER, FOR SUSPENSION ORAL at 09:05

## 2024-05-03 RX ADMIN — BARIUM SULFATE 176 G: 960 POWDER, FOR SUSPENSION ORAL at 09:05

## 2024-05-06 ENCOUNTER — TELEPHONE (OUTPATIENT)
Dept: SURGERY | Facility: CLINIC | Age: 39
End: 2024-05-06
Payer: COMMERCIAL

## 2024-05-06 NOTE — TELEPHONE ENCOUNTER
[facility]  General Surgery Clinic Telephone encounter    Esophagram with suspected stricture vs achalasia at GEJ with proximal dilation.  Patient currently tolerating regular diet with moderate reflux but without regurgitation.      Recommend EGD for further evaluation with possible dilation.      Findings and recommendations discussed with patient and spouse with plans to confer with oncology team        Gail Chandler MD  General Surgery   [phone]        5/6/2024  3:37 PM          Gail Chandler MD  General Surgery   [phone]

## 2024-05-29 ENCOUNTER — PATIENT MESSAGE (OUTPATIENT)
Dept: NEUROLOGY | Facility: CLINIC | Age: 39
End: 2024-05-29
Payer: COMMERCIAL

## 2024-05-29 ENCOUNTER — TELEPHONE (OUTPATIENT)
Dept: NEUROLOGY | Facility: CLINIC | Age: 39
End: 2024-05-29
Payer: COMMERCIAL

## 2024-05-29 NOTE — TELEPHONE ENCOUNTER
----- Message from Alec Humphrey sent at 5/29/2024 10:31 AM CDT -----  Type:  Patient Returning Call    Who Called:pt  Who Left Message for Patient:  Does the patient know what this is regarding?:plan of care  Would the patient rather a call back or a response via [facility]? call  Best Call Back Number: [phone]  Additional Information: pt states they would like a call from office asap to go over pts future care steps. Thank you

## 2024-05-29 NOTE — TELEPHONE ENCOUNTER
Called Patient and left voicemail to schedule a follow up appointment to discuss future care plans.

## 2024-05-31 ENCOUNTER — TELEPHONE (OUTPATIENT)
Dept: NEUROLOGY | Facility: CLINIC | Age: 39
End: 2024-05-31
Payer: COMMERCIAL

## 2024-05-31 NOTE — TELEPHONE ENCOUNTER
Called and spoke to patient spouse to get appointment scheduled for a virtual appointment June 4th,2024 at 11:00am.

## 2024-06-04 ENCOUNTER — TELEPHONE (OUTPATIENT)
Dept: NEUROLOGY | Facility: CLINIC | Age: 39
End: 2024-06-04
Payer: COMMERCIAL

## 2024-06-04 ENCOUNTER — OFFICE VISIT (OUTPATIENT)
Dept: NEUROSURGERY | Facility: CLINIC | Age: 39
End: 2024-06-04
Payer: COMMERCIAL

## 2024-06-04 ENCOUNTER — PATIENT MESSAGE (OUTPATIENT)
Dept: NEUROLOGY | Facility: CLINIC | Age: 39
End: 2024-06-04
Payer: COMMERCIAL

## 2024-06-04 ENCOUNTER — TELEPHONE (OUTPATIENT)
Dept: NEUROLOGY | Facility: CLINIC | Age: 39
End: 2024-06-04

## 2024-06-04 DIAGNOSIS — R26.81 GAIT INSTABILITY: ICD-10-CM

## 2024-06-04 DIAGNOSIS — H53.2 DOUBLE VISION: ICD-10-CM

## 2024-06-04 DIAGNOSIS — C71.9 DIFFUSE MIDLINE GLIOMA, H3 K27M MUTANT: Primary | ICD-10-CM

## 2024-06-04 DIAGNOSIS — G93.6 CEREBRAL EDEMA: ICD-10-CM

## 2024-06-04 NOTE — PROGRESS NOTES
Subjective:       Patient ID: Sukumar Wiseman is a 38 y.o. male.    Chief Complaint: brain tumor    HPI  Oncologic History  8/2023: began noticing worsening double vision  11/2023: began having imbalance, intermittent headaches  12/2023: MRI brain ordered by PCP for symptoms revealed a right cerebellar mass  1/4/2024: subtotal resection (Luis Wallace) of the mass with pathology consistent with high grade glioma; molecular studies confirmed diagnosis of diffuse midline glioma, [id number], WHO grade 4  2/15-3/27/2024: radiation to 60Gy over 30fx ([facility])  4/25/2024: started GOV851/placebo trial with [facility]  5/23/2024: MRI brain with POD    Sukumar presents today with his wife. He had retinopathy affecting his right eye vision during radiation which has resolved. For the past 4 weeks, Sukumar has had worsening double vision and gait instability. Because of these symptoms, he had an off-schedule MRI performed at [facility] late last month, which reportedly showed progression of disease. He was taken off the [id number] study and prescribed lomustine. He was given a dexamethasone 12mg bolus which helped with his symptoms for a few days. He continues on dexamethasone 5mg daily with persistent double vision and gait instability.    Past Medical History:   Diagnosis Date    Cancer 12/21/2023    Headache     Hypertension     145/93,  not medicated      Current Outpatient Medications   Medication Sig Dispense Refill    acetaminophen (TYLENOL) 500 MG tablet Take 500 mg by mouth every 4 (four) hours as needed.      dexAMETHasone (DECADRON) 4 MG Tab --a single 2mg tablet every 6 hours for 3 days --a single 2mg tablet every 8 hours for 3 days --a single 2mg tablet every 12 hours for 4 days --a single 2mg tablet every morning for 4 days (Patient taking differently: --a single 2mg tablet every 6 hours for 3 days --a single 2mg tablet every 8 hours for 3 days --a single 2mg tablet every 12 hours for 4 days --a single 2mg tablet every  morning for 4 days    Pt taking once daily 4mg as of 1.24.24) 33 tablet 0    EScitalopram oxalate (LEXAPRO) 10 MG tablet Take 1 tablet (10 mg total) by mouth once daily. 30 tablet 11    lomustine (GLEOSTINE) 100 MG capsule Take 200 mg (2 capsules) by mouth at bedtime on day 1 of each 42-day cycle. 2 capsule 0    LORazepam (ATIVAN) 0.5 MG tablet Take 0.5 mg by mouth daily as needed.      metoprolol succinate (TOPROL-XL) 25 MG 24 hr tablet Take 1 tablet (25 mg total) by mouth once daily. (Patient not taking: Reported on 4/18/2024) 30 tablet 5    ondansetron (ZOFRAN) 8 MG tablet Take 8 mg by mouth every 8 (eight) hours as needed.      pantoprazole (PROTONIX) 40 MG tablet Take 40 mg by mouth once daily.      promethazine (PHENERGAN) 12.5 MG Tab Take 12.5 mg by mouth every 4 (four) hours as needed.       No current facility-administered medications for this visit.     Facility-Administered Medications Ordered in Other Visits   Medication Dose Route Frequency Provider Last Rate Last Admin    mupirocin 2 % ointment   Nasal On Call Procedure Cesar Mayberry MD   Given at 01/04/24 1058      Past Surgical History:   Procedure Laterality Date    SUBOCCIPITAL CRANIOTOMY Right 01/04/2024    Procedure: CRANIOTOMY, SUBOCCIPITAL MIS;  Surgeon: Luis Wallace MD;  Location: [address];  Service: Neurosurgery;  Laterality: Right;      Family History   Problem Relation Name Age of Onset    Hypertension Mother Clonia     Hyperlipidemia Mother Clonia     Hypertension Father      Colon cancer Maternal Grandfather Osman     Cancer Maternal Grandfather Osman       Social History     Tobacco Use    Smoking status: Never     Passive exposure: Never    Smokeless tobacco: Never   Substance Use Topics    Alcohol use: Not Currently    Drug use: Never   ; has two children ages 12 and 8       Review of Systems  KPS: 80      Objective:      There were no vitals filed for this visit.   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   Per my read, there is unchanged residual enhancing mass in the right cerebellar hemisphere.    Per patient report, MRI from last month showed POD. These images not available for my personal review.      Assessment:       Problem List Items Addressed This Visit          Oncology    Diffuse midline glioma, H3 K27M mutant - Primary    Relevant Orders    Comprehensive Metabolic Panel    CBC Auto Differential    MRI Brain (Tumor with Perfusion) W W/O Contrast (XPD)     Other Visit Diagnoses       Cerebral edema        Double vision        Gait instability                    Plan:       Sukumar Wiseman is a 38 y.o. male with a cerebellar diffuse midline glioma, [id number], WHO grade 4 s/p subtotal resection 1/4/2024, radiation to 60Gy completed 3/2024, [id number] vs placebo trial 4-5/2024, with POD on MRI from 5/2024 presenting for follow up and treatment recommendations. Tumor diagnosis was heralded by imbalance and headache. Current symptoms include imbalance, double vision, and scanning speech.    Diffuse midline glioma, [id number], WHO grade 4  Sukumar has lomustine in hand from [facility] that he plans to start today for recurrent diffuse midline glioma. I agree with this choice for second line treatment.    We discussed monitoring for lomustine, which includes weekly CBC weeks 4-6 and CMP at week 6. He will obtain these locally weeks 4-5, and follow up with me week 6 for CBC/CMP and surveillance MRI brain w/wo contrast w/ perfusion. Discussed that imaging capabilities at [facility] are comparable to [facility]. Due to proximity of [facility] compared to [facility], they would like to consider continuation of care here. I will support them in whichever institution they choose for his care, and I am happy to manage or coordinate care to  the extent that they desire.    I have reached out to my [id number] contact to request information on receiving [id number] for compassionate use if he was getting placebo. Sukumar and his wife are aware that due to study limitations it may not be possible to find out if he was receiving placebo vs medication.    Cerebral edema/double vision/gait instability  Currently on dexamethasone 5mg daily. He had more improvement in symptoms temporarily with a dexamethasone 12mg bolus. Continue to monitor. If symptoms are steroid dependent, consider addition of bevacizumab x4-6 infusions. I am not aware of a clinical trial for recurrent DMG, and with his symptoms affecting his quality of life/performance status/potentially requiring increasing doses of steroids, I think the benefits of COLIN in the future may outweigh his disqualification from subsequent clinical trials.              The patient location is: in the car in [place]  The chief complaint leading to consultation is: brain tumor    Visit type: audiovisual    Face to Face time with patient: 12 minutes  40 minutes of total time spent on the encounter, which includes face to face time and non-face to face time preparing to see the patient (eg, review of tests), Obtaining and/or reviewing separately obtained history, Documenting clinical information in the electronic or other health record, Independently interpreting results (not separately reported) and communicating results to the patient/family/caregiver, or Care coordination (not separately reported).         Each patient to whom he or she provides medical services by telemedicine is:  (1) informed of the relationship between the physician and patient and the respective role of any other health care provider with respect to management of the patient; and (2) notified that he or she may decline to receive medical services by telemedicine and may withdraw from such care at any time.    Notes:       Karen Og,  MD  Department of Neurology  Neuro-Oncology, Movement Disorders

## 2024-06-04 NOTE — TELEPHONE ENCOUNTER
Called patient's spouse to set up future appointments. CBC's scheduled for 7/2/24 and 7/9/24 at 12:00pm. Patients labs, Mri and 6 week follow up scheduled 7/19/24 in person.

## 2024-06-04 NOTE — TELEPHONE ENCOUNTER
Called patient and left voicemail to find out where patient would like labs scheduled and which [facility] was his last MRI done to request that they be sent over.

## 2024-06-06 ENCOUNTER — PATIENT MESSAGE (OUTPATIENT)
Dept: NEUROSURGERY | Facility: CLINIC | Age: 39
End: 2024-06-06
Payer: COMMERCIAL

## 2024-06-10 ENCOUNTER — TELEPHONE (OUTPATIENT)
Dept: NEUROLOGY | Facility: CLINIC | Age: 39
End: 2024-06-10
Payer: COMMERCIAL

## 2024-06-10 ENCOUNTER — PATIENT MESSAGE (OUTPATIENT)
Dept: NEUROSURGERY | Facility: CLINIC | Age: 39
End: 2024-06-10
Payer: COMMERCIAL

## 2024-06-10 NOTE — TELEPHONE ENCOUNTER
Patient spouse called stating that patient symptoms have worsen and that they would like to be seen the next day.

## 2024-06-12 ENCOUNTER — OFFICE VISIT (OUTPATIENT)
Dept: NEUROLOGY | Facility: CLINIC | Age: 39
End: 2024-06-12
Payer: COMMERCIAL

## 2024-06-12 ENCOUNTER — TELEPHONE (OUTPATIENT)
Dept: OPHTHALMOLOGY | Facility: CLINIC | Age: 39
End: 2024-06-12
Payer: COMMERCIAL

## 2024-06-12 ENCOUNTER — PATIENT MESSAGE (OUTPATIENT)
Dept: NEUROLOGY | Facility: CLINIC | Age: 39
End: 2024-06-12

## 2024-06-12 VITALS
HEIGHT: 70 IN | BODY MASS INDEX: 30.77 KG/M2 | DIASTOLIC BLOOD PRESSURE: 92 MMHG | HEART RATE: 78 BPM | SYSTOLIC BLOOD PRESSURE: 124 MMHG | WEIGHT: 214.94 LBS

## 2024-06-12 DIAGNOSIS — R26.89 IMBALANCE: Primary | ICD-10-CM

## 2024-06-12 DIAGNOSIS — C71.9 DIFFUSE MIDLINE GLIOMA, H3 K27M MUTANT: Primary | ICD-10-CM

## 2024-06-12 DIAGNOSIS — H55.09 NYSTAGMUS ASSOCIATED WITH CENTRAL VESTIBULAR DISORDER: ICD-10-CM

## 2024-06-12 DIAGNOSIS — H53.2 DOUBLE VISION: ICD-10-CM

## 2024-06-12 DIAGNOSIS — R27.0 ATAXIA: ICD-10-CM

## 2024-06-12 PROCEDURE — 99999 PR PBB SHADOW E&M-EST. PATIENT-LVL IV: CPT | Mod: PBBFAC,,, | Performed by: PSYCHIATRY & NEUROLOGY

## 2024-06-12 PROCEDURE — 1159F MED LIST DOCD IN RCRD: CPT | Mod: CPTII,S$GLB,, | Performed by: PSYCHIATRY & NEUROLOGY

## 2024-06-12 PROCEDURE — 3074F SYST BP LT 130 MM HG: CPT | Mod: CPTII,S$GLB,, | Performed by: PSYCHIATRY & NEUROLOGY

## 2024-06-12 PROCEDURE — 99215 OFFICE O/P EST HI 40 MIN: CPT | Mod: S$GLB,,, | Performed by: PSYCHIATRY & NEUROLOGY

## 2024-06-12 PROCEDURE — 3080F DIAST BP >= 90 MM HG: CPT | Mod: CPTII,S$GLB,, | Performed by: PSYCHIATRY & NEUROLOGY

## 2024-06-12 PROCEDURE — 3044F HG A1C LEVEL LT 7.0%: CPT | Mod: CPTII,S$GLB,, | Performed by: PSYCHIATRY & NEUROLOGY

## 2024-06-12 PROCEDURE — 3008F BODY MASS INDEX DOCD: CPT | Mod: CPTII,S$GLB,, | Performed by: PSYCHIATRY & NEUROLOGY

## 2024-06-12 RX ORDER — PROCHLORPERAZINE MALEATE 10 MG
10 TABLET ORAL ONCE
COMMUNITY

## 2024-06-12 NOTE — TELEPHONE ENCOUNTER
----- Message from Katharina Warren MA sent at 6/12/2024 11:40 AM CDT -----  Pt referred to Dr Izquierdo

## 2024-06-12 NOTE — PROGRESS NOTES
Subjective:       Patient ID: Sukumar Wiseman is a 38 y.o. male.    Chief Complaint: brain tumor    Follow-up      Oncologic History  8/2023: began noticing worsening double vision  11/2023: began having imbalance, intermittent headaches  12/2023: MRI brain ordered by PCP for symptoms revealed a right cerebellar mass  1/4/2024: subtotal resection (Luis Wallace) of the mass with pathology consistent with high grade glioma; molecular studies confirmed diagnosis of diffuse midline glioma, [id number], WHO grade 4  2/15-3/27/2024: radiation to 60Gy over 30fx ([facility])  4/25/2024: started [id number]/placebo trial with [facility]  5/23/2024: MRI brain with POD    Sukumar presents today with his wife. He has had worsening imbalance, nausea and dizziness with lying down, and fatigue with standing. He re-started metoprolol for tachycardia a couple of weeks ago, prior to symptom resurgence.     Past Medical History:   Diagnosis Date    Cancer 12/21/2023    Headache     Hypertension     145/93,  not medicated      Current Outpatient Medications   Medication Sig Dispense Refill    acetaminophen (TYLENOL) 500 MG tablet Take 500 mg by mouth every 4 (four) hours as needed.      dexAMETHasone (DECADRON) 4 MG Tab --a single 2mg tablet every 6 hours for 3 days --a single 2mg tablet every 8 hours for 3 days --a single 2mg tablet every 12 hours for 4 days --a single 2mg tablet every morning for 4 days (Patient taking differently: --a single 2mg tablet every 6 hours for 3 days --a single 2mg tablet every 8 hours for 3 days --a single 2mg tablet every 12 hours for 4 days --a single 2mg tablet every morning for 4 days    Pt taking once daily 4mg as of 1.24.24) 33 tablet 0    EScitalopram oxalate (LEXAPRO) 10 MG tablet Take 1 tablet (10 mg total) by mouth once daily. 30 tablet 11    lomustine (GLEOSTINE) 100 MG capsule Take 200 mg (2 capsules) by mouth at bedtime on day 1 of each 42-day cycle. 2 capsule 0    LORazepam (ATIVAN) 0.5 MG tablet Take 0.5  mg by mouth daily as needed.      metoprolol succinate (TOPROL-XL) 25 MG 24 hr tablet Take 1 tablet (25 mg total) by mouth once daily. 30 tablet 5    ondansetron (ZOFRAN) 8 MG tablet Take 8 mg by mouth every 8 (eight) hours as needed.      pantoprazole (PROTONIX) 40 MG tablet Take 40 mg by mouth once daily.      promethazine (PHENERGAN) 12.5 MG Tab Take 12.5 mg by mouth every 4 (four) hours as needed.      prochlorperazine (COMPAZINE) 10 MG tablet Take 10 mg by mouth once.       No current facility-administered medications for this visit.     Facility-Administered Medications Ordered in Other Visits   Medication Dose Route Frequency Provider Last Rate Last Admin    mupirocin 2 % ointment   Nasal On Call Procedure Cesar Mayberry MD   Given at 01/04/24 1058      Past Surgical History:   Procedure Laterality Date    SUBOCCIPITAL CRANIOTOMY Right 01/04/2024    Procedure: CRANIOTOMY, SUBOCCIPITAL MIS;  Surgeon: Luis Wallace MD;  Location: [address];  Service: Neurosurgery;  Laterality: Right;      Family History   Problem Relation Name Age of Onset    Hypertension Mother Clonia     Hyperlipidemia Mother Clonia     Hypertension Father      Colon cancer Maternal Grandfather Osman     Cancer Maternal Grandfather Osman       Social History     Tobacco Use    Smoking status: Never     Passive exposure: Never    Smokeless tobacco: Never   Substance Use Topics    Alcohol use: Not Currently    Drug use: Never   ; has two children ages 12 and 8       Review of Systems  KPS: 80      Objective:      Vitals:    06/12/24 1029   BP: (!) 124/92   Pulse: 78       NEUROLOGICAL EXAMINATION:     MENTAL STATUS   Attention: normal. Concentration: normal.   Speech: (scanning)  Level of consciousness: alert    CRANIAL NERVES     CN III, IV, VI   CN III: right CN III palsy    CN V   Facial sensation intact.     CN VII   Facial expression full, symmetric.     CN VIII   CN VIII normal.     CN IX, X   CN IX normal.   CN X  normal.     CN XI   CN XI normal.     CN XII   CN XII normal.     MOTOR EXAM   Muscle bulk: normal    Strength   Strength 5/5 throughout.     REFLEXES     Reflexes   Right brachioradialis: 2+  Left brachioradialis: 2+  Right biceps: 2+  Left biceps: 2+  Right patellar: 3+  Left patellar: 2+  Right achilles: 3+  Left achilles: 2+    SENSORY EXAM   Light touch normal.     GAIT AND COORDINATION     Gait  Gait: wide-based     Coordination   Finger to nose coordination: abnormal (dysmetria on the right)         MRI of the brain from 4/23/2024 was personally visualized and compared to priors.   Per my read, there is unchanged residual enhancing mass in the right cerebellar hemisphere.    Per patient report, MRI from last month showed POD. These images not available for my personal review.      Assessment:       Problem List Items Addressed This Visit          Neuro    Ataxia    Relevant Orders    Ambulatory referral/consult to Physical/Occupational Therapy       Oncology    Diffuse midline glioma, H3 K27M mutant - Primary    Relevant Orders    Ambulatory referral/consult to Ophthalmology     Other Visit Diagnoses       Nystagmus associated with central vestibular disorder        Relevant Orders    Ambulatory referral/consult to Ophthalmology    Double vision        Relevant Orders    Ambulatory referral/consult to Ophthalmology                  Plan:       Sukumar Wiseman is a 38 y.o. male with a cerebellar diffuse midline glioma, [id number], WHO grade 4 s/p subtotal resection 1/4/2024, radiation to 60Gy completed 3/2024, [id number] vs placebo trial 4-5/2024, with POD on MRI from 5/2024 presenting for follow up and treatment recommendations. Tumor diagnosis was heralded by imbalance and headache. Current symptoms include imbalance, double vision, and scanning speech.    Diffuse midline glioma, [id number], WHO grade 4  Sukumar has lomustine in hand from [facility] that he plans to start today for recurrent diffuse midline  glioma. I agree with this choice for second line treatment.    We discussed monitoring for lomustine, which includes weekly CBC weeks 4-6 and CMP at week 6. He will obtain these locally weeks 4-5, and follow up with me week 6 for CBC/CMP and surveillance MRI brain w/wo contrast w/ perfusion. Discussed that imaging capabilities at [facility] are comparable to [facility]. Due to proximity of [facility] compared to [facility], they would like to consider continuation of care here. I will support them in whichever institution they choose for his care, and I am happy to manage or coordinate care to the extent that they desire.    Cerebral edema/double vision/gait instability  Currently on dexamethasone 5mg daily. He had more improvement in symptoms temporarily with a dexamethasone 12mg bolus. Due to resurgence of symptoms, his outside physician has prescribed an oral bolus of dexamethasone 12mg; I agree with this.    If symptoms are steroid dependent, consider addition of bevacizumab x4-6 infusions. I am not aware of a clinical trial for recurrent DMG, and with his symptoms affecting his quality of life/performance status/potentially requiring increasing doses of steroids, I think the benefits of COLIN in the future may outweigh his disqualification from subsequent clinical trials.    Ataxia  Referral to PT    Double vision  Referral to neuro-ophthalmology            Karen Og MD  Department of Neurology  Neuro-Oncology, Movement Disorders

## 2024-06-24 ENCOUNTER — PATIENT MESSAGE (OUTPATIENT)
Dept: NEUROLOGY | Facility: CLINIC | Age: 39
End: 2024-06-24
Payer: COMMERCIAL

## 2024-07-02 ENCOUNTER — LAB VISIT (OUTPATIENT)
Dept: LAB | Facility: HOSPITAL | Age: 39
End: 2024-07-02
Attending: PSYCHIATRY & NEUROLOGY
Payer: COMMERCIAL

## 2024-07-02 DIAGNOSIS — C71.9 DIFFUSE MIDLINE GLIOMA, H3 K27M MUTANT: ICD-10-CM

## 2024-07-02 LAB
BASOPHILS # BLD AUTO: 0.02 K/UL (ref 0–0.2)
BASOPHILS NFR BLD: 0.1 % (ref 0–1.9)
DIFFERENTIAL METHOD BLD: ABNORMAL
EOSINOPHIL # BLD AUTO: 0 K/UL (ref 0–0.5)
EOSINOPHIL NFR BLD: 0.1 % (ref 0–8)
ERYTHROCYTE [DISTWIDTH] IN BLOOD BY AUTOMATED COUNT: 13.2 % (ref 11.5–14.5)
HCT VFR BLD AUTO: 45.1 % (ref 40–54)
HGB BLD-MCNC: 15.3 G/DL (ref 14–18)
IMM GRANULOCYTES # BLD AUTO: 0.08 K/UL (ref 0–0.04)
IMM GRANULOCYTES NFR BLD AUTO: 0.6 % (ref 0–0.5)
LYMPHOCYTES # BLD AUTO: 1.4 K/UL (ref 1–4.8)
LYMPHOCYTES NFR BLD: 9.5 % (ref 18–48)
MCH RBC QN AUTO: 31.4 PG (ref 27–31)
MCHC RBC AUTO-ENTMCNC: 33.9 G/DL (ref 32–36)
MCV RBC AUTO: 93 FL (ref 82–98)
MONOCYTES # BLD AUTO: 0.9 K/UL (ref 0.3–1)
MONOCYTES NFR BLD: 6.3 % (ref 4–15)
NEUTROPHILS # BLD AUTO: 11.9 K/UL (ref 1.8–7.7)
NEUTROPHILS NFR BLD: 83.4 % (ref 38–73)
NRBC BLD-RTO: 0 /100 WBC
PLATELET # BLD AUTO: 212 K/UL (ref 150–450)
PMV BLD AUTO: 8.3 FL (ref 9.2–12.9)
RBC # BLD AUTO: 4.87 M/UL (ref 4.6–6.2)
WBC # BLD AUTO: 14.31 K/UL (ref 3.9–12.7)

## 2024-07-02 PROCEDURE — 36415 COLL VENOUS BLD VENIPUNCTURE: CPT | Performed by: PSYCHIATRY & NEUROLOGY

## 2024-07-02 PROCEDURE — 85025 COMPLETE CBC W/AUTO DIFF WBC: CPT | Performed by: PSYCHIATRY & NEUROLOGY

## 2024-07-12 ENCOUNTER — PATIENT MESSAGE (OUTPATIENT)
Dept: NEUROLOGY | Facility: CLINIC | Age: 39
End: 2024-07-12
Payer: COMMERCIAL

## 2024-07-18 ENCOUNTER — LAB VISIT (OUTPATIENT)
Dept: LAB | Facility: HOSPITAL | Age: 39
End: 2024-07-18
Attending: PSYCHIATRY & NEUROLOGY
Payer: COMMERCIAL

## 2024-07-18 DIAGNOSIS — C71.0 MALIGNANT NEOPLASM OF CEREBRUM, EXCEPT LOBES AND VENTRICLES: Primary | ICD-10-CM

## 2024-07-18 LAB
ALBUMIN SERPL BCP-MCNC: 3.4 G/DL (ref 3.5–5.2)
ALP SERPL-CCNC: 50 U/L (ref 55–135)
ALT SERPL W/O P-5'-P-CCNC: 41 U/L (ref 10–44)
ANION GAP SERPL CALC-SCNC: 6 MMOL/L (ref 3–11)
AST SERPL-CCNC: 13 U/L (ref 10–40)
BASOPHILS # BLD AUTO: 0.02 K/UL (ref 0–0.2)
BASOPHILS NFR BLD: 0.1 % (ref 0–1.9)
BILIRUB SERPL-MCNC: 1 MG/DL (ref 0.1–1)
BUN SERPL-MCNC: 15 MG/DL (ref 6–20)
CALCIUM SERPL-MCNC: 8.7 MG/DL (ref 8.7–10.5)
CHLORIDE SERPL-SCNC: 104 MMOL/L (ref 95–110)
CO2 SERPL-SCNC: 29 MMOL/L (ref 23–29)
CREAT SERPL-MCNC: 1 MG/DL (ref 0.5–1.4)
DIFFERENTIAL METHOD BLD: ABNORMAL
EOSINOPHIL # BLD AUTO: 0 K/UL (ref 0–0.5)
EOSINOPHIL NFR BLD: 0.1 % (ref 0–8)
ERYTHROCYTE [DISTWIDTH] IN BLOOD BY AUTOMATED COUNT: 13.4 % (ref 11.5–14.5)
EST. GFR  (NO RACE VARIABLE): >60 ML/MIN/1.73 M^2
GLUCOSE SERPL-MCNC: 108 MG/DL (ref 70–110)
HCT VFR BLD AUTO: 40.7 % (ref 40–54)
HGB BLD-MCNC: 14 G/DL (ref 14–18)
IMM GRANULOCYTES # BLD AUTO: 0.08 K/UL (ref 0–0.04)
IMM GRANULOCYTES NFR BLD AUTO: 0.5 % (ref 0–0.5)
LYMPHOCYTES # BLD AUTO: 0.7 K/UL (ref 1–4.8)
LYMPHOCYTES NFR BLD: 4.6 % (ref 18–48)
MCH RBC QN AUTO: 31.5 PG (ref 27–31)
MCHC RBC AUTO-ENTMCNC: 34.4 G/DL (ref 32–36)
MCV RBC AUTO: 92 FL (ref 82–98)
MONOCYTES # BLD AUTO: 0.8 K/UL (ref 0.3–1)
MONOCYTES NFR BLD: 5.4 % (ref 4–15)
NEUTROPHILS # BLD AUTO: 14 K/UL (ref 1.8–7.7)
NEUTROPHILS NFR BLD: 89.3 % (ref 38–73)
NRBC BLD-RTO: 0 /100 WBC
PLATELET # BLD AUTO: 225 K/UL (ref 150–450)
PMV BLD AUTO: 7.6 FL (ref 9.2–12.9)
POTASSIUM SERPL-SCNC: 4.1 MMOL/L (ref 3.5–5.1)
PROT SERPL-MCNC: 6.9 G/DL (ref 6–8.4)
RBC # BLD AUTO: 4.44 M/UL (ref 4.6–6.2)
SODIUM SERPL-SCNC: 139 MMOL/L (ref 136–145)
WBC # BLD AUTO: 15.65 K/UL (ref 3.9–12.7)

## 2024-07-18 PROCEDURE — 80053 COMPREHEN METABOLIC PANEL: CPT | Performed by: PSYCHIATRY & NEUROLOGY

## 2024-07-18 PROCEDURE — 85025 COMPLETE CBC W/AUTO DIFF WBC: CPT | Performed by: PSYCHIATRY & NEUROLOGY

## 2024-07-18 PROCEDURE — 36415 COLL VENOUS BLD VENIPUNCTURE: CPT | Performed by: PSYCHIATRY & NEUROLOGY

## 2024-07-19 ENCOUNTER — OFFICE VISIT (OUTPATIENT)
Dept: NEUROSURGERY | Facility: CLINIC | Age: 39
End: 2024-07-19
Payer: COMMERCIAL

## 2024-07-19 VITALS
SYSTOLIC BLOOD PRESSURE: 113 MMHG | HEIGHT: 70 IN | HEART RATE: 77 BPM | DIASTOLIC BLOOD PRESSURE: 67 MMHG | WEIGHT: 208.13 LBS | BODY MASS INDEX: 29.8 KG/M2

## 2024-07-19 DIAGNOSIS — H53.2 DOUBLE VISION: ICD-10-CM

## 2024-07-19 DIAGNOSIS — G93.6 CEREBRAL EDEMA: ICD-10-CM

## 2024-07-19 DIAGNOSIS — R26.81 GAIT INSTABILITY: ICD-10-CM

## 2024-07-19 DIAGNOSIS — C71.9 DIFFUSE MIDLINE GLIOMA, H3 K27M MUTANT: Primary | ICD-10-CM

## 2024-07-19 PROCEDURE — 3044F HG A1C LEVEL LT 7.0%: CPT | Mod: CPTII,S$GLB,, | Performed by: PSYCHIATRY & NEUROLOGY

## 2024-07-19 PROCEDURE — 99999 PR PBB SHADOW E&M-EST. PATIENT-LVL III: CPT | Mod: PBBFAC,,, | Performed by: PSYCHIATRY & NEUROLOGY

## 2024-07-19 PROCEDURE — 3078F DIAST BP <80 MM HG: CPT | Mod: CPTII,S$GLB,, | Performed by: PSYCHIATRY & NEUROLOGY

## 2024-07-19 PROCEDURE — 3008F BODY MASS INDEX DOCD: CPT | Mod: CPTII,S$GLB,, | Performed by: PSYCHIATRY & NEUROLOGY

## 2024-07-19 PROCEDURE — 3074F SYST BP LT 130 MM HG: CPT | Mod: CPTII,S$GLB,, | Performed by: PSYCHIATRY & NEUROLOGY

## 2024-07-19 PROCEDURE — 1159F MED LIST DOCD IN RCRD: CPT | Mod: CPTII,S$GLB,, | Performed by: PSYCHIATRY & NEUROLOGY

## 2024-07-19 PROCEDURE — G2211 COMPLEX E/M VISIT ADD ON: HCPCS | Mod: S$GLB,,, | Performed by: PSYCHIATRY & NEUROLOGY

## 2024-07-19 PROCEDURE — 99215 OFFICE O/P EST HI 40 MIN: CPT | Mod: S$GLB,,, | Performed by: PSYCHIATRY & NEUROLOGY

## 2024-07-19 NOTE — PROGRESS NOTES
Subjective:       Patient ID: Sukumar Wiseman is a 38 y.o. male.    Chief Complaint: brain tumor    Follow-up      Oncologic History  8/2023: began noticing worsening double vision  11/2023: began having imbalance, intermittent headaches  12/2023: MRI brain ordered by PCP for symptoms revealed a right cerebellar mass  1/4/2024: subtotal resection (Luis Wallace) of the mass with pathology consistent with high grade glioma; molecular studies confirmed diagnosis of diffuse midline glioma, [id number], WHO grade 4  2/15-3/27/2024: radiation to 60Gy over 30fx ([facility])  4/25/2024: started QJD065/placebo trial with [facility]  5/23/2024: MRI brain with POD  6/2024: C1 lomustine  7/16/2024: C2 lomustine    Sukumar presents today with his wife and 9 year old daughter. He is overall doing well. He had a couple of days of worse symptoms and nausea/vomiting after having a busy weekend and taking lomustine. He had an episode of vasovagal syncope.    Past Medical History:   Diagnosis Date    Cancer 12/21/2023    Headache     Hypertension     145/93,  not medicated      Current Outpatient Medications   Medication Sig Dispense Refill    acetaminophen (TYLENOL) 500 MG tablet Take 500 mg by mouth every 4 (four) hours as needed.      dexAMETHasone (DECADRON) 4 MG Tab --a single 2mg tablet every 6 hours for 3 days --a single 2mg tablet every 8 hours for 3 days --a single 2mg tablet every 12 hours for 4 days --a single 2mg tablet every morning for 4 days (Patient taking differently: --a single 2mg tablet every 6 hours for 3 days --a single 2mg tablet every 8 hours for 3 days --a single 2mg tablet every 12 hours for 4 days --a single 2mg tablet every morning for 4 days    Pt taking once daily 4mg as of 1.24.24) 33 tablet 0    EScitalopram oxalate (LEXAPRO) 10 MG tablet Take 1 tablet (10 mg total) by mouth once daily. 30 tablet 11    lomustine (GLEOSTINE) 100 MG capsule Take 200 mg (2 capsules) by mouth at bedtime on day 1 of each 42 day cycle.  2 capsule 0    LORazepam (ATIVAN) 0.5 MG tablet Take 0.5 mg by mouth daily as needed.      metoprolol succinate (TOPROL-XL) 25 MG 24 hr tablet Take 1 tablet (25 mg total) by mouth once daily. 30 tablet 5    ondansetron (ZOFRAN) 8 MG tablet Take 8 mg by mouth every 8 (eight) hours as needed.      pantoprazole (PROTONIX) 40 MG tablet Take 40 mg by mouth once daily.      prochlorperazine (COMPAZINE) 10 MG tablet Take 10 mg by mouth once.      promethazine (PHENERGAN) 12.5 MG Tab Take 12.5 mg by mouth every 4 (four) hours as needed.       No current facility-administered medications for this visit.     Facility-Administered Medications Ordered in Other Visits   Medication Dose Route Frequency Provider Last Rate Last Admin    mupirocin 2 % ointment   Nasal On Call Procedure Cesar Mayberry MD   Given at 01/04/24 1058      Past Surgical History:   Procedure Laterality Date    SUBOCCIPITAL CRANIOTOMY Right 01/04/2024    Procedure: CRANIOTOMY, SUBOCCIPITAL MIS;  Surgeon: Luis Wallace MD;  Location: [address];  Service: Neurosurgery;  Laterality: Right;      Family History   Problem Relation Name Age of Onset    Hypertension Mother Clonia     Hyperlipidemia Mother Clonia     Hypertension Father      Colon cancer Maternal Grandfather Osman     Cancer Maternal Grandfather Osman       Social History     Tobacco Use    Smoking status: Never     Passive exposure: Never    Smokeless tobacco: Never   Substance Use Topics    Alcohol use: Not Currently    Drug use: Never   ; has two children ages 12 and 9       Review of Systems  KPS: 80      Objective:      Vitals:    07/19/24 1052   BP: 113/67   Pulse: 77       NEUROLOGICAL EXAMINATION:     MENTAL STATUS   Attention: normal. Concentration: normal.   Speech: (scanning)  Level of consciousness: alert    CRANIAL NERVES     CN V   Facial sensation intact.     CN VII   Facial expression full, symmetric.     CN VIII   CN VIII normal.     CN IX, X   CN IX normal.   CN X  normal.     CN XI   CN XI normal.     CN XII   CN XII normal.        Disconjugate gaze  Saccadic intrusions with finger pursuit  Left beating nystagmus at rest     MOTOR EXAM   Muscle bulk: normal    Strength   Strength 5/5 throughout.     REFLEXES     Reflexes   Right brachioradialis: 2+  Left brachioradialis: 2+  Right biceps: 2+  Left biceps: 2+  Right patellar: 3+  Left patellar: 2+  Right achilles: 3+  Left achilles: 2+    SENSORY EXAM   Light touch normal.     GAIT AND COORDINATION     Gait  Gait: wide-based     Coordination   Finger to nose coordination: abnormal (dysmetria on the right)       Per report, MRI of the brain from 7/10/2024 is stable to improved. I have requested these images for my personal review.      Assessment:       Problem List Items Addressed This Visit          Oncology    Diffuse midline glioma, H3 K27M mutant - Primary     Other Visit Diagnoses       Cerebral edema        Double vision        Gait instability                        Plan:       Sukumar Wiseman is a 38 y.o. male with a cerebellar diffuse midline glioma, [id number], WHO grade 4 s/p subtotal resection 1/4/2024, radiation to 60Gy completed 3/2024, [id number] vs placebo trial 4-5/2024, with POD on MRI from 5/2024 presenting for follow up and treatment recommendations. Tumor diagnosis was heralded by imbalance and headache. Current symptoms include imbalance, double vision, and scanning speech.    Diffuse midline glioma, [id number], WHO grade 4  Sukumar has started C2 lomustine, prescribed by his outside provider.    We discussed monitoring for lomustine, which includes weekly CBC weeks 4-6 and CMP at week 6. He will obtain these locally weeks 4-5, and follow up with me week 6 for CBC/CMP and surveillance MRI brain w/wo contrast w/ perfusion. He would like to obtain imaging at [facility].    Cerebral edema/double vision/gait instability  Currently on dexamethasone 5mg daily. Plans to try to slowly taper.  If symptoms are steroid  dependent, consider addition of bevacizumab x4-6 infusions. I am not aware of a clinical trial for recurrent DMG, and with his symptoms affecting his quality of life/performance status/potentially requiring increasing doses of steroids, I think the benefits of COLIN in the future may outweigh his disqualification from subsequent clinical trials.              Karen Og MD  Department of Neurology  Neuro-Oncology, Movement Disorders

## 2024-07-25 ENCOUNTER — LAB VISIT (OUTPATIENT)
Dept: LAB | Facility: HOSPITAL | Age: 39
End: 2024-07-25
Attending: PHYSICIAN ASSISTANT
Payer: COMMERCIAL

## 2024-07-25 DIAGNOSIS — C71.9 DIFFUSE MIDLINE GLIOMA, H3 K27M MUTANT: ICD-10-CM

## 2024-07-25 LAB
ALBUMIN SERPL BCP-MCNC: 3.5 G/DL (ref 3.5–5.2)
ALP SERPL-CCNC: 50 U/L (ref 55–135)
ALT SERPL W/O P-5'-P-CCNC: 32 U/L (ref 10–44)
ANION GAP SERPL CALC-SCNC: 10 MMOL/L (ref 3–11)
AST SERPL-CCNC: 8 U/L (ref 10–40)
BASOPHILS # BLD AUTO: 0.02 K/UL (ref 0–0.2)
BASOPHILS NFR BLD: 0.2 % (ref 0–1.9)
BILIRUB SERPL-MCNC: 0.6 MG/DL (ref 0.1–1)
BUN SERPL-MCNC: 14 MG/DL (ref 6–20)
CALCIUM SERPL-MCNC: 9 MG/DL (ref 8.7–10.5)
CHLORIDE SERPL-SCNC: 100 MMOL/L (ref 95–110)
CO2 SERPL-SCNC: 29 MMOL/L (ref 23–29)
CREAT SERPL-MCNC: 0.9 MG/DL (ref 0.5–1.4)
DIFFERENTIAL METHOD BLD: ABNORMAL
EOSINOPHIL # BLD AUTO: 0 K/UL (ref 0–0.5)
EOSINOPHIL NFR BLD: 0.2 % (ref 0–8)
ERYTHROCYTE [DISTWIDTH] IN BLOOD BY AUTOMATED COUNT: 13.6 % (ref 11.5–14.5)
EST. GFR  (NO RACE VARIABLE): >60 ML/MIN/1.73 M^2
GLUCOSE SERPL-MCNC: 91 MG/DL (ref 70–110)
HCT VFR BLD AUTO: 42.1 % (ref 40–54)
HGB BLD-MCNC: 14.1 G/DL (ref 14–18)
IMM GRANULOCYTES # BLD AUTO: 0.1 K/UL (ref 0–0.04)
IMM GRANULOCYTES NFR BLD AUTO: 1.1 % (ref 0–0.5)
LYMPHOCYTES # BLD AUTO: 1.4 K/UL (ref 1–4.8)
LYMPHOCYTES NFR BLD: 15.4 % (ref 18–48)
MCH RBC QN AUTO: 31.3 PG (ref 27–31)
MCHC RBC AUTO-ENTMCNC: 33.5 G/DL (ref 32–36)
MCV RBC AUTO: 94 FL (ref 82–98)
MONOCYTES # BLD AUTO: 0.7 K/UL (ref 0.3–1)
MONOCYTES NFR BLD: 8.2 % (ref 4–15)
NEUTROPHILS # BLD AUTO: 6.6 K/UL (ref 1.8–7.7)
NEUTROPHILS NFR BLD: 74.9 % (ref 38–73)
NRBC BLD-RTO: 0 /100 WBC
PLATELET # BLD AUTO: 265 K/UL (ref 150–450)
PMV BLD AUTO: 8.3 FL (ref 9.2–12.9)
POTASSIUM SERPL-SCNC: 3.4 MMOL/L (ref 3.5–5.1)
PROT SERPL-MCNC: 7.2 G/DL (ref 6–8.4)
RBC # BLD AUTO: 4.5 M/UL (ref 4.6–6.2)
SODIUM SERPL-SCNC: 139 MMOL/L (ref 136–145)
WBC # BLD AUTO: 8.77 K/UL (ref 3.9–12.7)

## 2024-07-25 PROCEDURE — 36415 COLL VENOUS BLD VENIPUNCTURE: CPT | Performed by: PSYCHIATRY & NEUROLOGY

## 2024-07-25 PROCEDURE — 85025 COMPLETE CBC W/AUTO DIFF WBC: CPT | Performed by: PSYCHIATRY & NEUROLOGY

## 2024-07-25 PROCEDURE — 80053 COMPREHEN METABOLIC PANEL: CPT | Performed by: PSYCHIATRY & NEUROLOGY

## 2024-07-31 ENCOUNTER — PATIENT MESSAGE (OUTPATIENT)
Dept: NEUROSURGERY | Facility: CLINIC | Age: 39
End: 2024-07-31
Payer: COMMERCIAL

## 2024-07-31 ENCOUNTER — LAB VISIT (OUTPATIENT)
Dept: LAB | Facility: HOSPITAL | Age: 39
End: 2024-07-31
Attending: PHYSICIAN ASSISTANT
Payer: COMMERCIAL

## 2024-07-31 DIAGNOSIS — C71.0 MALIGNANT NEOPLASM OF CEREBRUM, EXCEPT LOBES AND VENTRICLES: Primary | ICD-10-CM

## 2024-07-31 LAB
ALBUMIN SERPL BCP-MCNC: 3.3 G/DL (ref 3.5–5.2)
ALP SERPL-CCNC: 47 U/L (ref 55–135)
ALT SERPL W/O P-5'-P-CCNC: 29 U/L (ref 10–44)
ANION GAP SERPL CALC-SCNC: 8 MMOL/L (ref 3–11)
AST SERPL-CCNC: 8 U/L (ref 10–40)
BASOPHILS # BLD AUTO: 0.01 K/UL (ref 0–0.2)
BASOPHILS NFR BLD: 0.1 % (ref 0–1.9)
BILIRUB SERPL-MCNC: 0.6 MG/DL (ref 0.1–1)
BUN SERPL-MCNC: 10 MG/DL (ref 6–20)
CALCIUM SERPL-MCNC: 8.5 MG/DL (ref 8.7–10.5)
CHLORIDE SERPL-SCNC: 104 MMOL/L (ref 95–110)
CO2 SERPL-SCNC: 30 MMOL/L (ref 23–29)
CREAT SERPL-MCNC: 0.9 MG/DL (ref 0.5–1.4)
DIFFERENTIAL METHOD BLD: ABNORMAL
EOSINOPHIL # BLD AUTO: 0 K/UL (ref 0–0.5)
EOSINOPHIL NFR BLD: 0.2 % (ref 0–8)
ERYTHROCYTE [DISTWIDTH] IN BLOOD BY AUTOMATED COUNT: 14.3 % (ref 11.5–14.5)
EST. GFR  (NO RACE VARIABLE): >60 ML/MIN/1.73 M^2
GLUCOSE SERPL-MCNC: 88 MG/DL (ref 70–110)
HCT VFR BLD AUTO: 40.2 % (ref 40–54)
HGB BLD-MCNC: 13.4 G/DL (ref 14–18)
IMM GRANULOCYTES # BLD AUTO: 0.08 K/UL (ref 0–0.04)
IMM GRANULOCYTES NFR BLD AUTO: 0.8 % (ref 0–0.5)
LYMPHOCYTES # BLD AUTO: 1.1 K/UL (ref 1–4.8)
LYMPHOCYTES NFR BLD: 11.3 % (ref 18–48)
MCH RBC QN AUTO: 32 PG (ref 27–31)
MCHC RBC AUTO-ENTMCNC: 33.3 G/DL (ref 32–36)
MCV RBC AUTO: 96 FL (ref 82–98)
MONOCYTES # BLD AUTO: 0.9 K/UL (ref 0.3–1)
MONOCYTES NFR BLD: 8.7 % (ref 4–15)
NEUTROPHILS # BLD AUTO: 7.7 K/UL (ref 1.8–7.7)
NEUTROPHILS NFR BLD: 78.9 % (ref 38–73)
NRBC BLD-RTO: 0 /100 WBC
PLATELET # BLD AUTO: 209 K/UL (ref 150–450)
PMV BLD AUTO: 7.9 FL (ref 9.2–12.9)
POTASSIUM SERPL-SCNC: 3.6 MMOL/L (ref 3.5–5.1)
PROT SERPL-MCNC: 6.7 G/DL (ref 6–8.4)
RBC # BLD AUTO: 4.19 M/UL (ref 4.6–6.2)
SODIUM SERPL-SCNC: 142 MMOL/L (ref 136–145)
WBC # BLD AUTO: 9.78 K/UL (ref 3.9–12.7)

## 2024-07-31 PROCEDURE — 85025 COMPLETE CBC W/AUTO DIFF WBC: CPT | Performed by: PHYSICIAN ASSISTANT

## 2024-07-31 PROCEDURE — 80053 COMPREHEN METABOLIC PANEL: CPT | Performed by: PHYSICIAN ASSISTANT

## 2024-07-31 PROCEDURE — 36415 COLL VENOUS BLD VENIPUNCTURE: CPT | Performed by: PHYSICIAN ASSISTANT

## 2024-08-01 ENCOUNTER — OFFICE VISIT (OUTPATIENT)
Dept: SURGERY | Facility: CLINIC | Age: 39
End: 2024-08-01
Payer: COMMERCIAL

## 2024-08-01 VITALS
HEART RATE: 66 BPM | SYSTOLIC BLOOD PRESSURE: 136 MMHG | BODY MASS INDEX: 29.92 KG/M2 | HEIGHT: 70 IN | OXYGEN SATURATION: 96 % | DIASTOLIC BLOOD PRESSURE: 86 MMHG | WEIGHT: 209 LBS

## 2024-08-01 DIAGNOSIS — K22.2 ESOPHAGEAL STRICTURE: Primary | ICD-10-CM

## 2024-08-01 DIAGNOSIS — K21.9 GASTROESOPHAGEAL REFLUX DISEASE, UNSPECIFIED WHETHER ESOPHAGITIS PRESENT: ICD-10-CM

## 2024-08-01 DIAGNOSIS — Z01.818 PRE-OP TESTING: ICD-10-CM

## 2024-08-01 PROCEDURE — 99999 PR PBB SHADOW E&M-EST. PATIENT-LVL V: CPT | Mod: PBBFAC,,, | Performed by: STUDENT IN AN ORGANIZED HEALTH CARE EDUCATION/TRAINING PROGRAM

## 2024-08-01 RX ORDER — TRAMADOL HYDROCHLORIDE 50 MG/1
50 TABLET ORAL
COMMUNITY
Start: 2024-05-23

## 2024-08-01 RX ORDER — SODIUM CHLORIDE 0.9 % (FLUSH) 0.9 %
10 SYRINGE (ML) INJECTION
OUTPATIENT
Start: 2024-08-01

## 2024-08-01 RX ORDER — SODIUM CHLORIDE 9 MG/ML
INJECTION, SOLUTION INTRAVENOUS CONTINUOUS
OUTPATIENT
Start: 2024-08-01

## 2024-08-01 RX ORDER — METOPROLOL TARTRATE 25 MG/1
25 TABLET, FILM COATED ORAL
COMMUNITY

## 2024-08-02 ENCOUNTER — PATIENT MESSAGE (OUTPATIENT)
Dept: SURGERY | Facility: CLINIC | Age: 39
End: 2024-08-02
Payer: COMMERCIAL

## 2024-08-02 NOTE — H&P (VIEW-ONLY)
"[facility] Progress Note    HPI:  Sukumar Wiseman is a 38 y.o. male with history of progressive dysphagia secondary to esophageal stricture who presents for follow-up.  Currently being treated for cerebellar glioma - recently completed cycle 2 of lomustine on 7/16.  Cyst dysphagia is worsening, waking him up from a sound sleep with subsequent vomiting.  Started sleeping with head of bed 30° to nights ago with some relief.      ROS:  Negative except above    PE:  Vitals:    08/01/24 0902   BP: 136/86   Pulse: 66   SpO2: 96%   Weight: 94.8 kg (208 lb 15.9 oz)   Height: 5' 10" (1.778 m)        Wt Readings from Last 2 Encounters:   08/01/24 94.8 kg (208 lb 15.9 oz)   07/19/24 94.4 kg (208 lb 1.8 oz)           Gen: Alert and oriented x3, judgement intact, NAD  CV: RRR  Resp: CTAB; breaths non-labored and symmetrical  Abd: Soft, NT, ND, BS+  Extremities: No c/c/e    Imaging:  FL Esophagram:  Impression:     Moderate dilatation of the distal aspect of the esophagus as above which may indicate the presence of achalasia and/or longstanding stricture.  There are retained food contents within the distal esophagus.       A/P:  38 y.o. male with stricture of the distal esophagus who presents for follow-up  -EGD with dilation 8/7  -Will reach out to treatment team for clearance and recommendations   -pre-op  -risks including perforation which may lead to a life-threatening infection, cardiac arrest, stroke, death explained to patient who voiced understanding  -recommend soft diet    Gail Chandler MD  General Surgery   [phone]        8/2/2024  10:28 AM  "

## 2024-08-02 NOTE — PROGRESS NOTES
"[facility] Progress Note    HPI:  Sukumar Wiseman is a 38 y.o. male with history of progressive dysphagia secondary to esophageal stricture who presents for follow-up.  Currently being treated for cerebellar glioma - recently completed cycle 2 of lomustine on 7/16.  Cyst dysphagia is worsening, waking him up from a sound sleep with subsequent vomiting.  Started sleeping with head of bed 30° to nights ago with some relief.      ROS:  Negative except above    PE:  Vitals:    08/01/24 0902   BP: 136/86   Pulse: 66   SpO2: 96%   Weight: 94.8 kg (208 lb 15.9 oz)   Height: 5' 10" (1.778 m)        Wt Readings from Last 2 Encounters:   08/01/24 94.8 kg (208 lb 15.9 oz)   07/19/24 94.4 kg (208 lb 1.8 oz)           Gen: Alert and oriented x3, judgement intact, NAD  CV: RRR  Resp: CTAB; breaths non-labored and symmetrical  Abd: Soft, NT, ND, BS+  Extremities: No c/c/e    Imaging:  FL Esophagram:  Impression:     Moderate dilatation of the distal aspect of the esophagus as above which may indicate the presence of achalasia and/or longstanding stricture.  There are retained food contents within the distal esophagus.       A/P:  38 y.o. male with stricture of the distal esophagus who presents for follow-up  -EGD with dilation 8/7  -Will reach out to treatment team for clearance and recommendations   -pre-op  -risks including perforation which may lead to a life-threatening infection, cardiac arrest, stroke, death explained to patient who voiced understanding  -recommend soft diet    Gail Chandler MD  General Surgery   [phone]        8/2/2024  10:28 AM  "

## 2024-08-05 ENCOUNTER — HOSPITAL ENCOUNTER (OUTPATIENT)
Dept: PULMONOLOGY | Facility: HOSPITAL | Age: 39
Discharge: HOME | End: 2024-08-05
Attending: STUDENT IN AN ORGANIZED HEALTH CARE EDUCATION/TRAINING PROGRAM
Payer: COMMERCIAL

## 2024-08-05 ENCOUNTER — HOSPITAL ENCOUNTER (OUTPATIENT)
Dept: PREADMISSION TESTING | Facility: HOSPITAL | Age: 39
Discharge: HOME | End: 2024-08-05
Attending: STUDENT IN AN ORGANIZED HEALTH CARE EDUCATION/TRAINING PROGRAM
Payer: COMMERCIAL

## 2024-08-05 VITALS — WEIGHT: 208 LBS | HEIGHT: 70 IN | BODY MASS INDEX: 29.78 KG/M2

## 2024-08-05 DIAGNOSIS — Z01.818 PRE-OP TESTING: ICD-10-CM

## 2024-08-05 DIAGNOSIS — K22.2 ESOPHAGEAL STRICTURE: ICD-10-CM

## 2024-08-05 LAB
OHS QRS DURATION: 92 MS
OHS QTC CALCULATION: 430 MS

## 2024-08-05 PROCEDURE — 93005 ELECTROCARDIOGRAM TRACING: CPT

## 2024-08-05 PROCEDURE — 93010 ELECTROCARDIOGRAM REPORT: CPT | Mod: ,,, | Performed by: INTERNAL MEDICINE

## 2024-08-06 ENCOUNTER — ANESTHESIA EVENT (OUTPATIENT)
Dept: ENDOSCOPY | Facility: HOSPITAL | Age: 39
End: 2024-08-06
Payer: COMMERCIAL

## 2024-08-06 ENCOUNTER — PATIENT MESSAGE (OUTPATIENT)
Dept: NEUROSURGERY | Facility: CLINIC | Age: 39
End: 2024-08-06
Payer: COMMERCIAL

## 2024-08-07 ENCOUNTER — ANESTHESIA (OUTPATIENT)
Dept: ENDOSCOPY | Facility: HOSPITAL | Age: 39
End: 2024-08-07
Payer: COMMERCIAL

## 2024-08-07 ENCOUNTER — PATIENT MESSAGE (OUTPATIENT)
Dept: NEUROSURGERY | Facility: CLINIC | Age: 39
End: 2024-08-07
Payer: COMMERCIAL

## 2024-08-07 ENCOUNTER — HOSPITAL ENCOUNTER (INPATIENT)
Facility: HOSPITAL | Age: 39
LOS: 2 days | Discharge: HOME | DRG: 178 | End: 2024-08-10
Attending: STUDENT IN AN ORGANIZED HEALTH CARE EDUCATION/TRAINING PROGRAM | Admitting: STUDENT IN AN ORGANIZED HEALTH CARE EDUCATION/TRAINING PROGRAM
Payer: COMMERCIAL

## 2024-08-07 DIAGNOSIS — J69.0 ASPIRATION PNEUMONIA, UNSPECIFIED ASPIRATION PNEUMONIA TYPE, UNSPECIFIED LATERALITY, UNSPECIFIED PART OF LUNG: Primary | ICD-10-CM

## 2024-08-07 DIAGNOSIS — K22.2 ESOPHAGEAL STRICTURE: ICD-10-CM

## 2024-08-07 DIAGNOSIS — J69.0 ASPIRATION PNEUMONIA: ICD-10-CM

## 2024-08-07 DIAGNOSIS — J18.9 PNEUMONIA: ICD-10-CM

## 2024-08-07 DIAGNOSIS — K21.9 GERD (GASTROESOPHAGEAL REFLUX DISEASE): ICD-10-CM

## 2024-08-07 DIAGNOSIS — R13.19 ESOPHAGEAL DYSPHAGIA: ICD-10-CM

## 2024-08-07 DIAGNOSIS — I48.91 ATRIAL FIBRILLATION WITH TACHYCARDIC VENTRICULAR RATE: ICD-10-CM

## 2024-08-07 LAB
ALBUMIN SERPL BCP-MCNC: 3.5 G/DL (ref 3.5–5.2)
ALP SERPL-CCNC: 48 U/L (ref 55–135)
ALT SERPL W/O P-5'-P-CCNC: 34 U/L (ref 10–44)
ANION GAP SERPL CALC-SCNC: 7 MMOL/L (ref 3–11)
AST SERPL-CCNC: 9 U/L (ref 10–40)
BASOPHILS # BLD AUTO: 0.01 K/UL (ref 0–0.2)
BASOPHILS NFR BLD: 0.2 % (ref 0–1.9)
BILIRUB SERPL-MCNC: 1 MG/DL (ref 0.1–1)
BUN SERPL-MCNC: 10 MG/DL (ref 6–20)
CALCIUM SERPL-MCNC: 8.9 MG/DL (ref 8.7–10.5)
CHLORIDE SERPL-SCNC: 102 MMOL/L (ref 95–110)
CO2 SERPL-SCNC: 31 MMOL/L (ref 23–29)
CREAT SERPL-MCNC: 0.9 MG/DL (ref 0.5–1.4)
DIFFERENTIAL METHOD BLD: ABNORMAL
EOSINOPHIL # BLD AUTO: 0 K/UL (ref 0–0.5)
EOSINOPHIL NFR BLD: 0.4 % (ref 0–8)
ERYTHROCYTE [DISTWIDTH] IN BLOOD BY AUTOMATED COUNT: 14.2 % (ref 11.5–14.5)
EST. GFR  (NO RACE VARIABLE): >60 ML/MIN/1.73 M^2
GLUCOSE SERPL-MCNC: 88 MG/DL (ref 70–110)
HCT VFR BLD AUTO: 39.6 % (ref 40–54)
HGB BLD-MCNC: 13.4 G/DL (ref 14–18)
IMM GRANULOCYTES # BLD AUTO: 0.03 K/UL (ref 0–0.04)
IMM GRANULOCYTES NFR BLD AUTO: 0.6 % (ref 0–0.5)
LYMPHOCYTES # BLD AUTO: 1.2 K/UL (ref 1–4.8)
LYMPHOCYTES NFR BLD: 23.7 % (ref 18–48)
MCH RBC QN AUTO: 31.8 PG (ref 27–31)
MCHC RBC AUTO-ENTMCNC: 33.8 G/DL (ref 32–36)
MCV RBC AUTO: 94 FL (ref 82–98)
MONOCYTES # BLD AUTO: 0.8 K/UL (ref 0.3–1)
MONOCYTES NFR BLD: 14.5 % (ref 4–15)
NEUTROPHILS # BLD AUTO: 3.1 K/UL (ref 1.8–7.7)
NEUTROPHILS NFR BLD: 60.6 % (ref 38–73)
NRBC BLD-RTO: 0 /100 WBC
PLATELET # BLD AUTO: 145 K/UL (ref 150–450)
PMV BLD AUTO: 8.1 FL (ref 9.2–12.9)
POTASSIUM SERPL-SCNC: 3.4 MMOL/L (ref 3.5–5.1)
PROT SERPL-MCNC: 6.9 G/DL (ref 6–8.4)
RBC # BLD AUTO: 4.21 M/UL (ref 4.6–6.2)
SODIUM SERPL-SCNC: 140 MMOL/L (ref 136–145)
WBC # BLD AUTO: 5.18 K/UL (ref 3.9–12.7)

## 2024-08-07 PROCEDURE — 37000008 HC ANESTHESIA 1ST 15 MINUTES: Performed by: STUDENT IN AN ORGANIZED HEALTH CARE EDUCATION/TRAINING PROGRAM

## 2024-08-07 PROCEDURE — 63600175 PHARM REV CODE 636 W HCPCS: Performed by: STUDENT IN AN ORGANIZED HEALTH CARE EDUCATION/TRAINING PROGRAM

## 2024-08-07 PROCEDURE — 63600175 PHARM REV CODE 636 W HCPCS: Performed by: ANESTHESIOLOGY

## 2024-08-07 PROCEDURE — 94761 N-INVAS EAR/PLS OXIMETRY MLT: CPT

## 2024-08-07 PROCEDURE — 36415 COLL VENOUS BLD VENIPUNCTURE: CPT | Performed by: STUDENT IN AN ORGANIZED HEALTH CARE EDUCATION/TRAINING PROGRAM

## 2024-08-07 PROCEDURE — 80053 COMPREHEN METABOLIC PANEL: CPT | Performed by: STUDENT IN AN ORGANIZED HEALTH CARE EDUCATION/TRAINING PROGRAM

## 2024-08-07 PROCEDURE — 94640 AIRWAY INHALATION TREATMENT: CPT | Mod: XB

## 2024-08-07 PROCEDURE — 99900031 HC PATIENT EDUCATION (STAT)

## 2024-08-07 PROCEDURE — 43235 EGD DIAGNOSTIC BRUSH WASH: CPT | Mod: 74 | Performed by: STUDENT IN AN ORGANIZED HEALTH CARE EDUCATION/TRAINING PROGRAM

## 2024-08-07 PROCEDURE — 37000009 HC ANESTHESIA EA ADD 15 MINS: Performed by: STUDENT IN AN ORGANIZED HEALTH CARE EDUCATION/TRAINING PROGRAM

## 2024-08-07 PROCEDURE — 99900035 HC TECH TIME PER 15 MIN (STAT)

## 2024-08-07 PROCEDURE — 25000003 PHARM REV CODE 250: Performed by: STUDENT IN AN ORGANIZED HEALTH CARE EDUCATION/TRAINING PROGRAM

## 2024-08-07 PROCEDURE — 0DJ08ZZ INSPECTION OF UPPER INTESTINAL TRACT, VIA NATURAL OR ARTIFICIAL OPENING ENDOSCOPIC: ICD-10-PCS | Performed by: STUDENT IN AN ORGANIZED HEALTH CARE EDUCATION/TRAINING PROGRAM

## 2024-08-07 PROCEDURE — 63600175 PHARM REV CODE 636 W HCPCS: Performed by: NURSE ANESTHETIST, CERTIFIED REGISTERED

## 2024-08-07 PROCEDURE — 27000221 HC OXYGEN, UP TO 24 HOURS

## 2024-08-07 PROCEDURE — 85025 COMPLETE CBC W/AUTO DIFF WBC: CPT | Performed by: STUDENT IN AN ORGANIZED HEALTH CARE EDUCATION/TRAINING PROGRAM

## 2024-08-07 PROCEDURE — 43235 EGD DIAGNOSTIC BRUSH WASH: CPT | Mod: 53,,, | Performed by: STUDENT IN AN ORGANIZED HEALTH CARE EDUCATION/TRAINING PROGRAM

## 2024-08-07 PROCEDURE — 25000242 PHARM REV CODE 250 ALT 637 W/ HCPCS: Performed by: STUDENT IN AN ORGANIZED HEALTH CARE EDUCATION/TRAINING PROGRAM

## 2024-08-07 PROCEDURE — 63600175 PHARM REV CODE 636 W HCPCS

## 2024-08-07 RX ORDER — SODIUM CHLORIDE 0.9 % (FLUSH) 0.9 %
10 SYRINGE (ML) INJECTION
Status: DISCONTINUED | OUTPATIENT
Start: 2024-08-07 | End: 2024-08-07 | Stop reason: HOSPADM

## 2024-08-07 RX ORDER — PANTOPRAZOLE SODIUM 40 MG/10ML
40 INJECTION, POWDER, LYOPHILIZED, FOR SOLUTION INTRAVENOUS DAILY
Status: DISCONTINUED | OUTPATIENT
Start: 2024-08-08 | End: 2024-08-10 | Stop reason: HOSPADM

## 2024-08-07 RX ORDER — SUMATRIPTAN 6 MG/.5ML
6 INJECTION, SOLUTION SUBCUTANEOUS ONCE
Status: DISCONTINUED | OUTPATIENT
Start: 2024-08-07 | End: 2024-08-10 | Stop reason: HOSPADM

## 2024-08-07 RX ORDER — ESCITALOPRAM OXALATE 10 MG/1
10 TABLET ORAL DAILY
Status: DISCONTINUED | OUTPATIENT
Start: 2024-08-08 | End: 2024-08-10 | Stop reason: HOSPADM

## 2024-08-07 RX ORDER — DIPHENHYDRAMINE HYDROCHLORIDE 50 MG/ML
25 INJECTION INTRAMUSCULAR; INTRAVENOUS EVERY 6 HOURS PRN
Status: DISCONTINUED | OUTPATIENT
Start: 2024-08-07 | End: 2024-08-07 | Stop reason: HOSPADM

## 2024-08-07 RX ORDER — SODIUM CHLORIDE 9 MG/ML
INJECTION, SOLUTION INTRAVENOUS CONTINUOUS
Status: DISCONTINUED | OUTPATIENT
Start: 2024-08-07 | End: 2024-08-07

## 2024-08-07 RX ORDER — TOPICAL ANESTHETIC 200 MG/ML
SPRAY DENTAL; PERIODONTAL
Status: DISCONTINUED | OUTPATIENT
Start: 2024-08-07 | End: 2024-08-07 | Stop reason: HOSPADM

## 2024-08-07 RX ORDER — LIDOCAINE HYDROCHLORIDE 10 MG/ML
1 INJECTION, SOLUTION EPIDURAL; INFILTRATION; INTRACAUDAL; PERINEURAL ONCE AS NEEDED
Status: DISCONTINUED | OUTPATIENT
Start: 2024-08-07 | End: 2024-08-10 | Stop reason: HOSPADM

## 2024-08-07 RX ORDER — MORPHINE SULFATE 2 MG/ML
1 INJECTION, SOLUTION INTRAMUSCULAR; INTRAVENOUS EVERY 6 HOURS PRN
Status: DISCONTINUED | OUTPATIENT
Start: 2024-08-07 | End: 2024-08-10 | Stop reason: HOSPADM

## 2024-08-07 RX ORDER — PROPOFOL 10 MG/ML
VIAL (ML) INTRAVENOUS
Status: DISCONTINUED | OUTPATIENT
Start: 2024-08-07 | End: 2024-08-07

## 2024-08-07 RX ORDER — SODIUM CHLORIDE 0.9 % (FLUSH) 0.9 %
10 SYRINGE (ML) INJECTION
Status: DISCONTINUED | OUTPATIENT
Start: 2024-08-07 | End: 2024-08-10 | Stop reason: HOSPADM

## 2024-08-07 RX ORDER — PROCHLORPERAZINE EDISYLATE 5 MG/ML
5 INJECTION INTRAMUSCULAR; INTRAVENOUS ONCE
Status: COMPLETED | OUTPATIENT
Start: 2024-08-07 | End: 2024-08-07

## 2024-08-07 RX ORDER — ONDANSETRON HYDROCHLORIDE 2 MG/ML
INJECTION, SOLUTION INTRAVENOUS
Status: DISCONTINUED | OUTPATIENT
Start: 2024-08-07 | End: 2024-08-07

## 2024-08-07 RX ORDER — SODIUM CHLORIDE 9 MG/ML
INJECTION, SOLUTION INTRAVENOUS CONTINUOUS
Status: DISCONTINUED | OUTPATIENT
Start: 2024-08-07 | End: 2024-08-08

## 2024-08-07 RX ORDER — GLUCAGON 1 MG
1 KIT INJECTION
Status: DISCONTINUED | OUTPATIENT
Start: 2024-08-07 | End: 2024-08-07 | Stop reason: HOSPADM

## 2024-08-07 RX ORDER — ONDANSETRON HYDROCHLORIDE 2 MG/ML
4 INJECTION, SOLUTION INTRAVENOUS EVERY 12 HOURS PRN
Status: DISCONTINUED | OUTPATIENT
Start: 2024-08-07 | End: 2024-08-10 | Stop reason: HOSPADM

## 2024-08-07 RX ORDER — PROCHLORPERAZINE EDISYLATE 5 MG/ML
5 INJECTION INTRAMUSCULAR; INTRAVENOUS EVERY 6 HOURS PRN
Status: DISCONTINUED | OUTPATIENT
Start: 2024-08-07 | End: 2024-08-10 | Stop reason: HOSPADM

## 2024-08-07 RX ORDER — MORPHINE SULFATE 4 MG/ML
4 INJECTION, SOLUTION INTRAMUSCULAR; INTRAVENOUS EVERY 5 MIN PRN
Status: DISCONTINUED | OUTPATIENT
Start: 2024-08-07 | End: 2024-08-07 | Stop reason: HOSPADM

## 2024-08-07 RX ORDER — ONDANSETRON HYDROCHLORIDE 2 MG/ML
4 INJECTION, SOLUTION INTRAVENOUS DAILY PRN
Status: DISCONTINUED | OUTPATIENT
Start: 2024-08-07 | End: 2024-08-07 | Stop reason: HOSPADM

## 2024-08-07 RX ORDER — TRAMADOL HYDROCHLORIDE 50 MG/1
50 TABLET ORAL EVERY 6 HOURS PRN
Status: DISCONTINUED | OUTPATIENT
Start: 2024-08-07 | End: 2024-08-07

## 2024-08-07 RX ORDER — PROMETHAZINE HYDROCHLORIDE 25 MG/1
25 TABLET ORAL EVERY 6 HOURS PRN
Status: DISCONTINUED | OUTPATIENT
Start: 2024-08-07 | End: 2024-08-07

## 2024-08-07 RX ORDER — METOPROLOL TARTRATE 25 MG/1
25 TABLET, FILM COATED ORAL 2 TIMES DAILY
Status: DISCONTINUED | OUTPATIENT
Start: 2024-08-07 | End: 2024-08-10 | Stop reason: HOSPADM

## 2024-08-07 RX ORDER — HYDROMORPHONE HYDROCHLORIDE 2 MG/ML
0.5 INJECTION, SOLUTION INTRAMUSCULAR; INTRAVENOUS; SUBCUTANEOUS EVERY 5 MIN PRN
Status: DISCONTINUED | OUTPATIENT
Start: 2024-08-07 | End: 2024-08-07 | Stop reason: HOSPADM

## 2024-08-07 RX ORDER — CODEINE PHOSPHATE AND GUAIFENESIN 10; 100 MG/5ML; MG/5ML
5 SOLUTION ORAL EVERY 4 HOURS PRN
Status: DISCONTINUED | OUTPATIENT
Start: 2024-08-07 | End: 2024-08-10 | Stop reason: HOSPADM

## 2024-08-07 RX ORDER — ACETAMINOPHEN 325 MG/1
650 TABLET ORAL EVERY 8 HOURS PRN
Status: DISCONTINUED | OUTPATIENT
Start: 2024-08-07 | End: 2024-08-10 | Stop reason: HOSPADM

## 2024-08-07 RX ORDER — IPRATROPIUM BROMIDE AND ALBUTEROL SULFATE 2.5; .5 MG/3ML; MG/3ML
3 SOLUTION RESPIRATORY (INHALATION)
Status: DISCONTINUED | OUTPATIENT
Start: 2024-08-07 | End: 2024-08-08

## 2024-08-07 RX ORDER — POTASSIUM CHLORIDE 7.45 MG/ML
20 INJECTION INTRAVENOUS ONCE
Status: COMPLETED | OUTPATIENT
Start: 2024-08-07 | End: 2024-08-07

## 2024-08-07 RX ADMIN — PROMETHAZINE HYDROCHLORIDE 25 MG: 25 INJECTION INTRAMUSCULAR; INTRAVENOUS at 09:08

## 2024-08-07 RX ADMIN — Medication 50 MG: at 07:08

## 2024-08-07 RX ADMIN — IPRATROPIUM BROMIDE AND ALBUTEROL SULFATE 3 ML: 2.5; .5 SOLUTION RESPIRATORY (INHALATION) at 10:08

## 2024-08-07 RX ADMIN — IPRATROPIUM BROMIDE AND ALBUTEROL SULFATE 3 ML: 2.5; .5 SOLUTION RESPIRATORY (INHALATION) at 12:08

## 2024-08-07 RX ADMIN — POTASSIUM CHLORIDE 20 MEQ: 7.46 INJECTION, SOLUTION INTRAVENOUS at 05:08

## 2024-08-07 RX ADMIN — PIPERACILLIN SODIUM AND TAZOBACTAM SODIUM 4.5 G: 4; .5 INJECTION, POWDER, FOR SOLUTION INTRAVENOUS at 02:08

## 2024-08-07 RX ADMIN — ONDANSETRON 4 MG: 2 INJECTION INTRAMUSCULAR; INTRAVENOUS at 07:08

## 2024-08-07 RX ADMIN — PROCHLORPERAZINE EDISYLATE 5 MG: 5 INJECTION INTRAMUSCULAR; INTRAVENOUS at 11:08

## 2024-08-07 RX ADMIN — IPRATROPIUM BROMIDE AND ALBUTEROL SULFATE 3 ML: 2.5; .5 SOLUTION RESPIRATORY (INHALATION) at 04:08

## 2024-08-07 RX ADMIN — SODIUM CHLORIDE: 9 INJECTION, SOLUTION INTRAVENOUS at 07:08

## 2024-08-07 RX ADMIN — Medication 100 MG: at 07:08

## 2024-08-07 RX ADMIN — PROMETHAZINE HYDROCHLORIDE 25 MG: 25 INJECTION INTRAMUSCULAR; INTRAVENOUS at 04:08

## 2024-08-07 RX ADMIN — PROCHLORPERAZINE EDISYLATE 5 MG: 5 INJECTION INTRAMUSCULAR; INTRAVENOUS at 03:08

## 2024-08-07 RX ADMIN — SODIUM CHLORIDE: 9 INJECTION, SOLUTION INTRAVENOUS at 02:08

## 2024-08-07 NOTE — PLAN OF CARE
Patient's wife came out of room stating he had vomited again small amount of yellow sputum.  Dr. Nichols notified and Compazine ordered.  Also C/O feeling cold, extra blankets applied and warmer applied @ 38 degrees.  Wife at bedside, instructed to call prn for assistance.

## 2024-08-07 NOTE — PLAN OF CARE
Dr. Chandler visited and discussed plan of care with patient and spouse. Will keep for extended recovery, monitoring. Oxygen titrated to keep saturation > 90%, on 4 LPM now.  Had a breathing treatment, denies any further complaints at this time.  Given some liquids by Dr. Chandler and instructed to remain NPO after that until reevaluated tomorrow.  Discussed with patient and spouse and they verbalized understanding.

## 2024-08-07 NOTE — CONSULTS
[facility]  Adult Nutrition  Consult Note    SUMMARY     Recommendations  1. Rec'd SLP consult.   2. Rec'd Regular diet with consistency rec's as per SLP.   3. Rec'd ONS: Ensure Enlive TID to provide 1050kcal and 60g of protein.   4. RD to follow and make rec's accordingly.  Goals:   1. Pt's diet will be advanced from NPO by next RD follow up.  Nutrition Goal Status: new  Communication of RD Recs: reviewed with RN    Assessment and Plan    Nutrition Problem  Inadequate protein energy intake    Related to (etiology):   Increased energy needs (Protein and Calories)    Signs and Symptoms (as evidenced by):   0% PO intake     Interventions/Recommendations (treatment strategy):  1. Rec'd SLP consult.   2. Rec'd Regular diet with consistency rec's as per SLP.   3. Rec'd ONS: Ensure Enlive TID to provide 1050kcal and 60g of protein.   4. RD to follow and make rec's accordingly.    Nutrition Diagnosis Status:   New       Malnutrition Assessment  NFPE not appropriate at this time. RD to continue to monitor for signs and symptoms of malnutrition.    Reason for Assessment    Reason For Assessment: other (see comments) (Full Liquid diet; request)  Diagnosis: other (see comments) (Esophageal stricture)  Relevant Medical History: cerebellar glioma, dysphagia  Interdisciplinary Rounds: did not attend  General Information Comments: RD triggered by RN for pt requesting a milkshake. Spoke with pt about food preferences and brought pt a milkshake made with Boost Plus (delivered by MD). Pt was on Full Liquid diet at that time, but has since been downgraded to NPO. RD to follow and make rec's accordingly.  Nutrition Discharge Planning: TBD as care progresses    Nutrition Risk Screen    Nutrition Risk Screen: difficulty chewing/swallowing    Nutrition/Diet History    Patient Reported Diet/Restrictions/Preferences: general  Spiritual, Cultural Beliefs, Faith Practices, Values that Affect Care: no  Food Allergies:  "NKFA  Factors Affecting Nutritional Intake: difficulty/impaired swallowing    Anthropometrics    Temp: 98.6 °F (37 °C)  Height: 5' 10" (177.8 cm)  Height (inches): 70 in  Weight Method: Bed Scale  Weight: 94.3 kg (207 lb 14.3 oz)  Weight (lb): 207.9 lb  Ideal Body Weight (IBW), Male: 166 lb  % Ideal Body Weight, Male (lb): 125.24 %  BMI (Calculated): 29.8  BMI Grade: 25 - 29.9 - overweight    Lab/Procedures/Meds    Pertinent Labs Reviewed: reviewed  Pertinent Medications Reviewed: reviewed    Estimated/Assessed Needs    Weight Used For Calorie Calculations: 94.3 kg (207 lb 14.3 oz)  Energy Calorie Requirements (kcal): 2829 (30kcal/kg)  Energy Need Method: Kcal/kg  Protein Requirements: 141 (1.5g/kg)  Weight Used For Protein Calculations: 94.3 kg (207 lb 14.3 oz) (141)  Fluid Requirements (mL): 2829 (1mL/kcal)  Estimated Fluid Requirement Method: RDA Method  RDA Method (mL): 2829    Nutrition Prescription Ordered    Current Diet Order: NPO  Oral Nutrition Supplement: None    Evaluation of Received Nutrient/Fluid Intake    % Kcal Needs: 0%  % Protein Needs: 0%  I/O: +194  Energy Calories Required: not meeting needs  Protein Required: not meeting needs  Tolerance: not tolerating  % Intake of Estimated Energy Needs: 0 - 25 %  % Meal Intake: NPO    Nutrition Risk    Level of Risk/Frequency of Follow-up: moderate     Monitor and Evaluation    Food and Nutrient Intake: energy intake, food and beverage intake  Food and Nutrient Adminstration: diet order  Knowledge/Beliefs/Attitudes: food and nutrition knowledge/skill, beliefs and attitudes  Physical Activity and Function: nutrition-related ADLs and IADLs  Anthropometric Measurements: height/length, weight, weight change, body mass index  Biochemical Data, Medical Tests and Procedures: electrolyte and renal panel, gastrointestinal profile, glucose/endocrine profile, lipid profile, inflammatory profile  Nutrition-Focused Physical Findings: overall appearance     Nutrition " Follow-Up    RD Follow-up?: Yes

## 2024-08-07 NOTE — PLAN OF CARE
Dr Chandler attempted EGD procedure; patient regurgitated a large amount of  solid food. Food was noted in the esophagus when scope was placed. Patient suctioned , EGD aborted,xray ordered.

## 2024-08-07 NOTE — INTERVAL H&P NOTE
Patient seen and examined.  No interval change since the below obtained H&P  Informed consent verified    NPO since midnight  All questions and concerns addressed  To Endo for EGD for dilation    Gail Chandler MD  General Surgery   [phone]

## 2024-08-07 NOTE — PLAN OF CARE
When entering patient's room to transfer him he had vomited a moderate amount of clear liquid.  Patient had drank some water.  Patient warm and a little diaphoretic.  V/S taken and were stable, patient transferred via wheelchair receiving oxygen @ 4 LPM via cannula accompanied by his spouse.  Settled in room   628, report to Giselle MORRIS.

## 2024-08-07 NOTE — PLAN OF CARE
Phoned dr esquivel with lab values plt 145 and wbc 5.18 and ok for procedure given.  Notifies pt of this

## 2024-08-07 NOTE — PLAN OF CARE
Received from PACU awake and alert with C/O mild pain across chest when coughing or taking a deep breath.  Receiving oxygen @ 3 LPM via N/C continued from PACU.  Coughing intermittently, unproductive.  New orders from Dr. Nichols received for repeat portable CXR at noon.  May home sips of water and take small pills.  HOB elevated, family at bedside, instructed to call prn for assistance.  Also ok to titrate N/C to keep sat > 90%.

## 2024-08-07 NOTE — PLAN OF CARE
Plan of care discussed with patient and wife at bedside. Patient came from 5th floor after EGD this afternoon. Patient currently on aspirations precautions. He will remain npo with only sips and ice chips with meds. Patient complains of nausea and severe headache upon arriving to unit. IV fluids started at 100, Compazine given first with little relief therefore phenergan iv given with moderate relief. Headache improved. A one time order for Imitrex available for future use if headache returns. Patient stable. O2 has been decreased to 3L. Wife will remain at patient side.       Problem: Adult Inpatient Plan of Care  Goal: Plan of Care Review  Outcome: Progressing  Goal: Patient-Specific Goal (Individualized)  Outcome: Progressing  Goal: Absence of Hospital-Acquired Illness or Injury  Outcome: Progressing  Goal: Optimal Comfort and Wellbeing  Outcome: Progressing  Goal: Readiness for Transition of Care  Outcome: Progressing     Problem: Pain Acute  Goal: Optimal Pain Control and Function  Outcome: Progressing     Problem: Fall Injury Risk  Goal: Absence of Fall and Fall-Related Injury  Outcome: Progressing     Problem: Swallowing Impairment  Goal: Optimal Eating/Swallowing without Aspiration  Outcome: Progressing

## 2024-08-08 PROBLEM — J69.0 ASPIRATION PNEUMONIA: Status: ACTIVE | Noted: 2024-08-08

## 2024-08-08 LAB
ALBUMIN SERPL BCP-MCNC: 3 G/DL (ref 3.5–5.2)
ALP SERPL-CCNC: 47 U/L (ref 55–135)
ALT SERPL W/O P-5'-P-CCNC: 25 U/L (ref 10–44)
ANION GAP SERPL CALC-SCNC: 13 MMOL/L (ref 3–11)
AST SERPL-CCNC: 15 U/L (ref 10–40)
BASOPHILS # BLD AUTO: ABNORMAL K/UL (ref 0–0.2)
BASOPHILS NFR BLD: 0 % (ref 0–1.9)
BILIRUB SERPL-MCNC: 2.4 MG/DL (ref 0.1–1)
BUN SERPL-MCNC: 15 MG/DL (ref 6–20)
CALCIUM SERPL-MCNC: 8.7 MG/DL (ref 8.7–10.5)
CHLORIDE SERPL-SCNC: 104 MMOL/L (ref 95–110)
CO2 SERPL-SCNC: 24 MMOL/L (ref 23–29)
CREAT SERPL-MCNC: 1.2 MG/DL (ref 0.5–1.4)
DIFFERENTIAL METHOD BLD: ABNORMAL
EOSINOPHIL # BLD AUTO: ABNORMAL K/UL (ref 0–0.5)
EOSINOPHIL NFR BLD: 0 % (ref 0–8)
ERYTHROCYTE [DISTWIDTH] IN BLOOD BY AUTOMATED COUNT: 14.4 % (ref 11.5–14.5)
EST. GFR  (NO RACE VARIABLE): >60 ML/MIN/1.73 M^2
GLUCOSE SERPL-MCNC: 119 MG/DL (ref 70–110)
HCT VFR BLD AUTO: 39.3 % (ref 40–54)
HGB BLD-MCNC: 13.1 G/DL (ref 14–18)
IMM GRANULOCYTES # BLD AUTO: ABNORMAL K/UL (ref 0–0.04)
IMM GRANULOCYTES NFR BLD AUTO: ABNORMAL % (ref 0–0.5)
LYMPHOCYTES # BLD AUTO: ABNORMAL K/UL (ref 1–4.8)
LYMPHOCYTES NFR BLD: 6 % (ref 18–48)
MAGNESIUM SERPL-MCNC: 1.9 MG/DL (ref 1.6–2.6)
MCH RBC QN AUTO: 31.6 PG (ref 27–31)
MCHC RBC AUTO-ENTMCNC: 33.3 G/DL (ref 32–36)
MCV RBC AUTO: 95 FL (ref 82–98)
METAMYELOCYTES NFR BLD MANUAL: 2 %
MONOCYTES # BLD AUTO: ABNORMAL K/UL (ref 0.3–1)
MONOCYTES NFR BLD: 5 % (ref 4–15)
NEUTROPHILS NFR BLD: 70 % (ref 38–73)
NEUTS BAND NFR BLD MANUAL: 17 %
NRBC BLD-RTO: 0 /100 WBC
OHS QRS DURATION: 80 MS
OHS QTC CALCULATION: 431 MS
PHOSPHATE SERPL-MCNC: 4.1 MG/DL (ref 2.7–4.5)
PLATELET # BLD AUTO: 108 K/UL (ref 150–450)
PMV BLD AUTO: 8.6 FL (ref 9.2–12.9)
POTASSIUM SERPL-SCNC: 3.3 MMOL/L (ref 3.5–5.1)
PROT SERPL-MCNC: 6 G/DL (ref 6–8.4)
RBC # BLD AUTO: 4.14 M/UL (ref 4.6–6.2)
SARS-COV-2 RNA RESP QL NAA+PROBE: NOT DETECTED
SODIUM SERPL-SCNC: 141 MMOL/L (ref 136–145)
WBC # BLD AUTO: 12.55 K/UL (ref 3.9–12.7)

## 2024-08-08 PROCEDURE — 93005 ELECTROCARDIOGRAM TRACING: CPT

## 2024-08-08 PROCEDURE — 93010 ELECTROCARDIOGRAM REPORT: CPT | Mod: ,,, | Performed by: INTERNAL MEDICINE

## 2024-08-08 PROCEDURE — 99233 SBSQ HOSP IP/OBS HIGH 50: CPT | Mod: ,,, | Performed by: STUDENT IN AN ORGANIZED HEALTH CARE EDUCATION/TRAINING PROGRAM

## 2024-08-08 PROCEDURE — 25000003 PHARM REV CODE 250: Performed by: STUDENT IN AN ORGANIZED HEALTH CARE EDUCATION/TRAINING PROGRAM

## 2024-08-08 PROCEDURE — 94640 AIRWAY INHALATION TREATMENT: CPT

## 2024-08-08 PROCEDURE — 99900035 HC TECH TIME PER 15 MIN (STAT)

## 2024-08-08 PROCEDURE — 27000221 HC OXYGEN, UP TO 24 HOURS

## 2024-08-08 PROCEDURE — 21400001 HC TELEMETRY ROOM

## 2024-08-08 PROCEDURE — 83735 ASSAY OF MAGNESIUM: CPT | Performed by: INTERNAL MEDICINE

## 2024-08-08 PROCEDURE — 85027 COMPLETE CBC AUTOMATED: CPT | Performed by: INTERNAL MEDICINE

## 2024-08-08 PROCEDURE — 80053 COMPREHEN METABOLIC PANEL: CPT | Performed by: INTERNAL MEDICINE

## 2024-08-08 PROCEDURE — 63600175 PHARM REV CODE 636 W HCPCS: Performed by: INTERNAL MEDICINE

## 2024-08-08 PROCEDURE — 84100 ASSAY OF PHOSPHORUS: CPT | Performed by: STUDENT IN AN ORGANIZED HEALTH CARE EDUCATION/TRAINING PROGRAM

## 2024-08-08 PROCEDURE — 25000242 PHARM REV CODE 250 ALT 637 W/ HCPCS: Performed by: INTERNAL MEDICINE

## 2024-08-08 PROCEDURE — 25000003 PHARM REV CODE 250

## 2024-08-08 PROCEDURE — 63600175 PHARM REV CODE 636 W HCPCS

## 2024-08-08 PROCEDURE — 63600175 PHARM REV CODE 636 W HCPCS: Performed by: STUDENT IN AN ORGANIZED HEALTH CARE EDUCATION/TRAINING PROGRAM

## 2024-08-08 PROCEDURE — 99900031 HC PATIENT EDUCATION (STAT)

## 2024-08-08 PROCEDURE — 87635 SARS-COV-2 COVID-19 AMP PRB: CPT | Performed by: STUDENT IN AN ORGANIZED HEALTH CARE EDUCATION/TRAINING PROGRAM

## 2024-08-08 PROCEDURE — 85007 BL SMEAR W/DIFF WBC COUNT: CPT | Performed by: INTERNAL MEDICINE

## 2024-08-08 PROCEDURE — 94761 N-INVAS EAR/PLS OXIMETRY MLT: CPT

## 2024-08-08 RX ORDER — LEVALBUTEROL 1.25 MG/.5ML
1.25 SOLUTION, CONCENTRATE RESPIRATORY (INHALATION)
Status: DISCONTINUED | OUTPATIENT
Start: 2024-08-08 | End: 2024-08-10 | Stop reason: HOSPADM

## 2024-08-08 RX ORDER — POTASSIUM CHLORIDE 7.45 MG/ML
20 INJECTION INTRAVENOUS ONCE
Status: COMPLETED | OUTPATIENT
Start: 2024-08-08 | End: 2024-08-08

## 2024-08-08 RX ORDER — METOPROLOL TARTRATE 1 MG/ML
INJECTION, SOLUTION INTRAVENOUS
Status: COMPLETED
Start: 2024-08-08 | End: 2024-08-08

## 2024-08-08 RX ORDER — METOPROLOL TARTRATE 1 MG/ML
5 INJECTION, SOLUTION INTRAVENOUS ONCE
Status: DISCONTINUED | OUTPATIENT
Start: 2024-08-08 | End: 2024-08-10 | Stop reason: HOSPADM

## 2024-08-08 RX ORDER — TRAMADOL HYDROCHLORIDE 50 MG/1
50 TABLET ORAL EVERY 6 HOURS PRN
Status: DISCONTINUED | OUTPATIENT
Start: 2024-08-08 | End: 2024-08-10 | Stop reason: HOSPADM

## 2024-08-08 RX ORDER — ADENOSINE 3 MG/ML
6 INJECTION INTRAVENOUS ONCE
Status: COMPLETED | OUTPATIENT
Start: 2024-08-08 | End: 2024-08-08

## 2024-08-08 RX ORDER — METOPROLOL TARTRATE 1 MG/ML
5 INJECTION, SOLUTION INTRAVENOUS ONCE
Status: DISCONTINUED | OUTPATIENT
Start: 2024-08-08 | End: 2024-08-08

## 2024-08-08 RX ORDER — DEXAMETHASONE SODIUM PHOSPHATE 4 MG/ML
3 INJECTION, SOLUTION INTRA-ARTICULAR; INTRALESIONAL; INTRAMUSCULAR; INTRAVENOUS; SOFT TISSUE EVERY 24 HOURS
Status: DISCONTINUED | OUTPATIENT
Start: 2024-08-08 | End: 2024-08-10 | Stop reason: HOSPADM

## 2024-08-08 RX ORDER — ADENOSINE 3 MG/ML
12 INJECTION INTRAVENOUS ONCE
Status: DISCONTINUED | OUTPATIENT
Start: 2024-08-08 | End: 2024-08-10 | Stop reason: HOSPADM

## 2024-08-08 RX ORDER — METOPROLOL TARTRATE 1 MG/ML
2.5 INJECTION, SOLUTION INTRAVENOUS ONCE
Status: COMPLETED | OUTPATIENT
Start: 2024-08-08 | End: 2024-08-08

## 2024-08-08 RX ORDER — SODIUM CHLORIDE 9 MG/ML
INJECTION, SOLUTION INTRAVENOUS ONCE
Status: COMPLETED | OUTPATIENT
Start: 2024-08-08 | End: 2024-08-08

## 2024-08-08 RX ORDER — SODIUM CHLORIDE AND POTASSIUM CHLORIDE 150; 900 MG/100ML; MG/100ML
INJECTION, SOLUTION INTRAVENOUS CONTINUOUS
Status: DISCONTINUED | OUTPATIENT
Start: 2024-08-08 | End: 2024-08-10 | Stop reason: HOSPADM

## 2024-08-08 RX ADMIN — METOROPROLOL TARTRATE 2.5 MG: 5 INJECTION, SOLUTION INTRAVENOUS at 10:08

## 2024-08-08 RX ADMIN — METOROPROLOL TARTRATE 2.5 MG: 5 INJECTION, SOLUTION INTRAVENOUS at 06:08

## 2024-08-08 RX ADMIN — ADENOSINE 6 MG: 3 INJECTION, SOLUTION INTRAVENOUS at 12:08

## 2024-08-08 RX ADMIN — POTASSIUM CHLORIDE 20 MEQ: 7.46 INJECTION, SOLUTION INTRAVENOUS at 01:08

## 2024-08-08 RX ADMIN — LEVALBUTEROL 1.25 MG: 1.25 SOLUTION, CONCENTRATE RESPIRATORY (INHALATION) at 09:08

## 2024-08-08 RX ADMIN — PIPERACILLIN SODIUM AND TAZOBACTAM SODIUM 4.5 G: 4; .5 INJECTION, POWDER, FOR SOLUTION INTRAVENOUS at 11:08

## 2024-08-08 RX ADMIN — METOROPROLOL TARTRATE 2.5 MG: 5 INJECTION, SOLUTION INTRAVENOUS at 05:08

## 2024-08-08 RX ADMIN — SODIUM CHLORIDE: 0.9 INJECTION, SOLUTION INTRAVENOUS at 12:08

## 2024-08-08 RX ADMIN — METOROPROLOL TARTRATE 5 MG: 5 INJECTION, SOLUTION INTRAVENOUS at 08:08

## 2024-08-08 RX ADMIN — DEXAMETHASONE SODIUM PHOSPHATE 3 MG: 4 INJECTION INTRA-ARTICULAR; INTRALESIONAL; INTRAMUSCULAR; INTRAVENOUS; SOFT TISSUE at 11:08

## 2024-08-08 RX ADMIN — PROMETHAZINE HYDROCHLORIDE 25 MG: 25 INJECTION INTRAMUSCULAR; INTRAVENOUS at 09:08

## 2024-08-08 RX ADMIN — ESCITALOPRAM OXALATE 10 MG: 10 TABLET ORAL at 09:08

## 2024-08-08 RX ADMIN — SODIUM CHLORIDE AND POTASSIUM CHLORIDE: .9; .15 SOLUTION INTRAVENOUS at 01:08

## 2024-08-08 RX ADMIN — METOPROLOL TARTRATE 25 MG: 25 TABLET, FILM COATED ORAL at 04:08

## 2024-08-08 RX ADMIN — PANTOPRAZOLE SODIUM 40 MG: 40 INJECTION, POWDER, FOR SOLUTION INTRAVENOUS at 09:08

## 2024-08-08 RX ADMIN — PIPERACILLIN SODIUM AND TAZOBACTAM SODIUM 4.5 G: 4; .5 INJECTION, POWDER, FOR SOLUTION INTRAVENOUS at 07:08

## 2024-08-08 RX ADMIN — METOPROLOL TARTRATE 25 MG: 25 TABLET, FILM COATED ORAL at 09:08

## 2024-08-08 RX ADMIN — LEVALBUTEROL 1.25 MG: 1.25 SOLUTION, CONCENTRATE RESPIRATORY (INHALATION) at 01:08

## 2024-08-08 RX ADMIN — PIPERACILLIN SODIUM AND TAZOBACTAM SODIUM 4.5 G: 4; .5 INJECTION, POWDER, FOR SOLUTION INTRAVENOUS at 12:08

## 2024-08-08 RX ADMIN — PIPERACILLIN SODIUM AND TAZOBACTAM SODIUM 4.5 G: 4; .5 INJECTION, POWDER, FOR SOLUTION INTRAVENOUS at 03:08

## 2024-08-08 NOTE — HPI
Patient presented for EGD for esophageal stricture and dilation. Patient found with food present in esophagus, began vomiting during procedure. Procedure stopped due to risk of aspiration. Patient found to have low oxygen saturation post op. Patient admitted and started on broad spectrum IV antibiotics, requiring supplemental oxygen.

## 2024-08-08 NOTE — PLAN OF CARE
[facility] Surg  Initial Discharge Assessment       Primary Care Provider: Larry Nicholas Jr., MD    Admission Diagnosis: GERD (gastroesophageal reflux disease) [K21.9]  Dysphagia [R13.10]  Esophageal stricture [K22.2]  Pneumonia [J18.9]    Admission Date: 8/7/2024  Expected Discharge Date:     Transition of Care Barriers: None    Payor: [employer] / Plan: [employer] [place] [place] [place] / Product Type: Self Funded /     Extended Emergency Contact Information  Primary Emergency Contact: Ryan Wiseman  Mobile Phone: [phone]  Relation: Spouse    Discharge Plan A: Home with family  Discharge Plan B: Home with family      [facility] [address] [facility] [address] [facility]  [address] [facility] [address]  Phone: [phone] Fax: [phone]      Initial Assessment (most recent)       Adult Discharge Assessment - 08/08/24 1039          Discharge Assessment    Assessment Type Discharge Planning Assessment     Confirmed/corrected address, phone number and insurance Yes     Confirmed Demographics Correct on Facesheet     Source of Information patient;family   Pt's spouse at bedside during assessment interview.    When was your last doctors appointment? 08/01/24   Appointment with General Alysa Chandler.    Communicated BULMARO with patient/caregiver Date not available/Unable to determine     Reason For Admission Aspiration pneumonia     People in Home spouse     Do you expect to return to your current living situation? Yes     Do you have help at home or someone to help you manage your care at home? No     Prior to hospitilization cognitive status: Alert/Oriented     Current cognitive status: Alert/Oriented     Walking or Climbing Stairs Difficulty no   Pt is independent with ADLs.    Dressing/Bathing Difficulty no   Pt is independent with ADLs.    Home Accessibility not wheelchair accessible     Home Layout Able to live on 1st floor     Equipment Currently Used at Home none      Readmission within 30 days? No     Patient currently being followed by outpatient case management? Yes     If yes, name of outpatient case management following: insurance company assigned oupatient case management     Do you currently have service(s) that help you manage your care at home? No     Do you take prescription medications? Yes     Do you have prescription coverage? Yes     Coverage [employer]EFIT - [employer] [place] [place] [place]     Do you have any problems affording any of your prescribed medications? No     Is the patient taking medications as prescribed? yes     Who is going to help you get home at discharge? Spouse     How do you get to doctors appointments? car, drives self     Are you on dialysis? No     Do you take coumadin? No     Discharge Plan A Home with family     Discharge Plan B Home with family     Discharge Plan discussed with: Spouse/sig other;Patient     Name(s) and Number(s) Ryan Wiseman (Spouse)  [phone]     Transition of Care Barriers None        Physical Activity    On average, how many days per week do you engage in moderate to strenuous exercise (like a brisk walk)? Patient declined     On average, how many minutes do you engage in exercise at this level? Patient declined        Financial Resource Strain    How hard is it for you to pay for the very basics like food, housing, medical care, and heating? Patient declined        Housing Stability    In the last 12 months, was there a time when you were not able to pay the mortgage or rent on time? Patient declined     At any time in the past 12 months, were you homeless or living in a shelter (including now)? Patient declined        Transportation Needs    Has the lack of transportation kept you from medical appointments, meetings, work or from getting things needed for daily living? Patient declined        Food Insecurity    Within the past 12 months, you worried that your food would run out before you got the  money to buy more. Patient declined     Within the past 12 months, the food you bought just didn't last and you didn't have money to get more. Patient declined        Stress    Do you feel stress - tense, restless, nervous, or anxious, or unable to sleep at night because your mind is troubled all the time - these days? Patient declined        Social Isolation    How often do you feel lonely or isolated from those around you?  Never        Alcohol Use    Q1: How often do you have a drink containing alcohol? Patient declined     Q2: How many drinks containing alcohol do you have on a typical day when you are drinking? Patient declined     Q3: How often do you have six or more drinks on one occasion? Patient declined        Utilities    In the past 12 months has the electric, gas, oil, or water company threatened to shut off services in your home? Patient declined        Health Literacy    How often do you need to have someone help you when you read instructions, pamphlets, or other written material from your doctor or pharmacy? Patient declines to respond                 Discharge assessment completed with patient.  Patient does not have any  needs at this time.  Discharge planning checklist given to patient/family with instructions to contact  for any needs.  SW will follow as needed.

## 2024-08-08 NOTE — HOSPITAL COURSE
08/08: patient seen and examined this morning. He is resting comfortably on supplemental oxygen. Episode of SVT during the night. Cardiology ultimately consulted for further recommendations. Continue IV antibiotics.    8/9/24:  patient sitting at the side of bed coughing and spitting into trashcan.  Family discussion held regarding plan of care.  Transfer to higher level of care recommended for ongoing pna treatment/platelet transfusion if needed/PT/OT/monitoring of heart rhythm.

## 2024-08-08 NOTE — SUBJECTIVE & OBJECTIVE
Past Medical History:   Diagnosis Date    Cancer 12/21/2023    Headache     Hypertension     145/93,  not medicated    SVT (supraventricular tachycardia)        Past Surgical History:   Procedure Laterality Date    SUBOCCIPITAL CRANIOTOMY Right 01/04/2024    Procedure: CRANIOTOMY, SUBOCCIPITAL MIS;  Surgeon: Luis Wallace MD;  Location: [facility] OR [address] [place] [address];  Service: Neurosurgery;  Laterality: Right;       Review of patient's allergies indicates:  No Known Allergies    Current Facility-Administered Medications on File Prior to Encounter   Medication    mupirocin 2 % ointment     Current Outpatient Medications on File Prior to Encounter   Medication Sig    dexAMETHasone (DECADRON) 4 MG Tab --a single 2mg tablet every 6 hours for 3 days --a single 2mg tablet every 8 hours for 3 days --a single 2mg tablet every 12 hours for 4 days --a single 2mg tablet every morning for 4 days (Patient taking differently: --a single 2mg tablet every 6 hours for 3 days --a single 2mg tablet every 8 hours for 3 days --a single 2mg tablet every 12 hours for 4 days --a single 2mg tablet every morning for 4 days    Pt taking once daily 4mg as of 1.24.24)    EScitalopram oxalate (LEXAPRO) 10 MG tablet Take 1 tablet (10 mg total) by mouth once daily.    lomustine (GLEOSTINE) 100 MG capsule Take 200 mg (2 capsules) by mouth at bedtime on day 1 of each 42 day cycle.    metoprolol tartrate (LOPRESSOR) 25 MG tablet Take 25 mg by mouth 2 (two) times daily.    pantoprazole (PROTONIX) 40 MG tablet Take 40 mg by mouth once daily.    promethazine (PHENERGAN) 12.5 MG Tab Take 12.5 mg by mouth every 4 (four) hours as needed.    traMADoL (ULTRAM) 50 mg tablet Take 50 mg by mouth.    acetaminophen (TYLENOL) 500 MG tablet Take 500 mg by mouth every 4 (four) hours as needed.    LORazepam (ATIVAN) 0.5 MG tablet Take 0.5 mg by mouth daily as needed.    ondansetron (ZOFRAN) 8 MG tablet Take 8 mg by mouth every 8 (eight) hours as needed.    prochlorperazine  (COMPAZINE) 10 MG tablet Take 10 mg by mouth once.     Family History       Problem Relation (Age of Onset)    Cancer Maternal Grandfather    Colon cancer Maternal Grandfather    Hyperlipidemia Mother    Hypertension Mother, Father          Tobacco Use    Smoking status: Never     Passive exposure: Never    Smokeless tobacco: Never   Substance and Sexual Activity    Alcohol use: Not Currently    Drug use: Never    Sexual activity: Yes     Partners: Female     Birth control/protection: None     Review of Systems   Constitutional:  Negative for chills and fever.   HENT:  Negative for rhinorrhea, sneezing and sore throat.    Eyes:  Negative for pain and visual disturbance.   Respiratory:  Negative for cough and shortness of breath.    Cardiovascular:  Negative for chest pain.   Gastrointestinal:  Negative for abdominal pain, constipation and diarrhea.   Endocrine: Negative for cold intolerance and heat intolerance.   Genitourinary:  Negative for difficulty urinating.   Musculoskeletal:  Negative for arthralgias and joint swelling.   Skin:  Negative for rash.   Allergic/Immunologic: Negative for environmental allergies.   Neurological:  Negative for dizziness, syncope and weakness.   Hematological:  Does not bruise/bleed easily.   Psychiatric/Behavioral:  Negative for dysphoric mood. The patient is not nervous/anxious.      Objective:     Vital Signs (Most Recent):  Temp: 98.3 °F (36.8 °C) (08/08/24 0423)  Pulse: (!) 167 (08/08/24 0900)  Resp: 18 (08/08/24 0727)  BP: (!) 112/58 (08/08/24 0900)  SpO2: (!) 92 % (08/08/24 0727) Vital Signs (24h Range):  Temp:  [97.9 °F (36.6 °C)-98.8 °F (37.1 °C)] 98.3 °F (36.8 °C)  Pulse:  [] 167  Resp:  [16-20] 18  SpO2:  [88 %-95 %] 92 %  BP: (108-138)/(58-87) 112/58     Weight: 94.3 kg (207 lb 14.3 oz)  Body mass index is 29.83 kg/m².     Physical Exam  Vitals and nursing note reviewed.   Constitutional:       Appearance: Normal appearance. He is obese.   HENT:      Head:  Normocephalic and atraumatic.      Nose: Nose normal.   Eyes:      Extraocular Movements: Extraocular movements intact.      Pupils: Pupils are equal, round, and reactive to light.   Cardiovascular:      Rate and Rhythm: Regular rhythm. Tachycardia present.      Heart sounds: No murmur heard.     No friction rub. No gallop.   Pulmonary:      Effort: Pulmonary effort is normal.      Breath sounds: Rhonchi present. No wheezing or rales.      Comments: Supplemental oxygen in place via NC.   Abdominal:      General: Abdomen is flat. Bowel sounds are normal.      Palpations: Abdomen is soft.   Musculoskeletal:         General: No swelling or deformity.      Cervical back: Normal range of motion and neck supple.   Skin:     General: Skin is warm and dry.      Capillary Refill: Capillary refill takes less than 2 seconds.   Neurological:      General: No focal deficit present.      Mental Status: He is alert and oriented to person, place, and time.   Psychiatric:         Mood and Affect: Mood normal.         Behavior: Behavior normal.          Significant Labs: All pertinent labs within the past 24 hours have been reviewed.    Significant Imaging: I have reviewed all pertinent imaging results/findings within the past 24 hours.

## 2024-08-08 NOTE — PLAN OF CARE
Outside Transfer Acceptance Note / Regional Referral Center    Referring facility: [facility]   Referring provider: AJAY BATISTA  Accepting facility: [facility]  Accepting provider: JOANN JAMISON  Admitting provider: TBSHAVONNE  Reason for transfer:  Needs GI evaluation  Transfer diagnosis: GERD (gastroesophageal reflux disease) [K21.9]  Dysphagia [R13.10]  Transfer specialty requested: Gastroenterology  Transfer specialty notified: Yes  Transfer level: NUMBER 1-5: 2  Bed type requested: Med-tele  Isolation status: No active isolations   Admission class or status: IP- Inpatient      Narrative     38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 Procedure was aborted due to large food burden in proximal esophagus with vomiting, and concern for possible aspiration. Patient required placement on O2 via NC, with BP stable and remained afebrile. CXR showed retrocardiac airspace opacity consistent with pneumonia with mild opacity also persisting in the left perihilar region. Patient started on zosyn. Patient's course is further c/b SVT. Cardiology was consulted and notes that this morning patient remained with sustained HR 170s after 5 mg IV Lopressor, 2.5 mg IV Lopressor, and 500 cc bolus NS. BP became hypotensive and pt's hemodynamics slowly became unstable. Pt was given 6 mg Adenosine IV push with flush. Crash cart at beside with pads attached. Post adenosine, HR improved to SR 90s. Pt stable, tolerated well. No immediate complications. Patient remains on PO metoprolol, with further recommendations from cardiology to follow. [facility] wishes to transfer for further GI eval, with patient NPO and HOB 30 degrees. If concern for airway protection, may need upgrade. Oncology will consult.    Objective     Vitals: Temp: 98 °F (36.7 °C) (08/08/24  1249)  Pulse: 90 (08/08/24 1336)  Resp: 18 (08/08/24 1336)  BP: 105/72 (08/08/24 1249)  SpO2: 95 % (08/08/24 1336)  Recent Labs: All pertinent labs within the past 24 hours have been reviewed.  Recent imaging: See above   Airway:     Vent settings: Oxygen Concentration (%):  [28-32] 28       IV access:        Peripheral IV - Single Lumen 08/07/24 0706 20 G Anterior;Proximal;Right Forearm (Active)   Site Assessment Clean;Dry;Intact;No redness;No swelling 08/08/24 0727   Extremity Assessment Distal to IV No abnormal discoloration;No redness;No swelling;No warmth 08/08/24 0400   Line Status Infusing 08/08/24 0400   Dressing Status Clean;Dry;Intact 08/08/24 0400   Dressing Intervention Integrity maintained 08/08/24 0400   Reason Not Rotated Anticipated discharge 08/07/24 0705            Peripheral IV - Single Lumen 08/08/24 1319 20 G Anterior;Distal;Left Forearm (Active)     Infusions: See above  Allergies: Review of patient's allergies indicates:  No Known Allergies   NPO: Yes    Anticoagulation:   Anticoagulants       None             Instructions      Jerome Oliva-  Admit to Hospital Medicine  Upon patient arrival to floor, please send SecureChat to [facility] or call extension [phone] (if no answer, do NOT leave a callback number after the beep, rather please send a SecureChat to [facility]), for Hospital Medicine admit team assignment and for additional admit orders for the patient.  Do not page the attending physician associated with the patient on arrival (this physician may not be on duty at the time of arrival).  Rather, always send a SecureChat to [facility] or call [phone] to reach the triage physician for orders and team assignment.

## 2024-08-08 NOTE — PLAN OF CARE
Recommendations  1. Rec'd SLP consult.   2. Rec'd Regular diet with consistency rec's as per SLP.   3. Rec'd ONS: Ensure Enlive TID to provide 1050kcal and 60g of protein.  4. Rec'd consider TPN/PPN.   5. RD to follow and make rec's accordingly.  Goals:   1. Pt's diet will be advanced from NPO by next RD follow up.  Nutrition Goal Status: goal not met

## 2024-08-08 NOTE — PROGRESS NOTES
"[facility]  Adult Nutrition  Progress Note    SUMMARY       Recommendations  1. Rec'd SLP consult.   2. Rec'd Regular diet with consistency rec's as per SLP.   3. Rec'd ONS: Ensure Enlive TID to provide 1050kcal and 60g of protein.  4. Rec'd consider TPN/PPN.   5. RD to follow and make rec's accordingly.  Goals:   1. Pt's diet will be advanced from NPO by next RD follow up.  Nutrition Goal Status: goal not met  Communication of RD Recs: reviewed with RN    Assessment and Plan    Nutrition Problem  Inadequate protein energy intake     Related to (etiology):   Increased energy needs (Protein and Calories)     Signs and Symptoms (as evidenced by):   0% PO intake , cancer dx     Interventions/Recommendations (treatment strategy):  1. Rec'd SLP consult.   2. Rec'd Regular diet with consistency rec's as per SLP.   3. Rec'd ONS: Ensure Enlive TID to provide 1050kcal and 60g of protein.  4. Rec'd consider TPN/PPN.   5. RD to follow and make rec's accordingly.    Nutrition Diagnosis Status:   Continues     Reason for Assessment    Reason For Assessment: RD follow-up  Diagnosis: other (see comments) (Esophageal stricture)  Relevant Medical History: cerebellar glioma, dysphagia  Interdisciplinary Rounds: did not attend  General Information Comments: Followed up on pt this afternoon. Pt is not tolerating PO intake/secretions. Rec'd consider TPN or PPN and SLP consult. RD to follow and make rec's accordingly.  Nutrition Discharge Planning: TBD as care  progresses    Nutrition Risk Screen    Nutrition Risk Screen: difficulty chewing/swallowing    Nutrition/Diet History    Patient Reported Diet/Restrictions/Preferences: general  Spiritual, Cultural Beliefs, Buddhist Practices, Values that Affect Care: no  Food Allergies: NKFA  Factors Affecting Nutritional Intake: difficulty/impaired swallowing    Anthropometrics    Temp: 98 °F (36.7 °C)  Height: 5' 10" (177.8 cm)  Height (inches): 70 in  Weight Method: Bed " Scale  Weight: 94.3 kg (207 lb 14.3 oz)  Weight (lb): 207.9 lb  Ideal Body Weight (IBW), Male: 166 lb  % Ideal Body Weight, Male (lb): 125.24 %  BMI (Calculated): 29.8  BMI Grade: 25 - 29.9 - overweight    Lab/Procedures/Meds    Pertinent Labs Reviewed: reviewed  Pertinent Medications Reviewed: reviewed    Estimated/Assessed Needs    Weight Used For Calorie Calculations: 94.3 kg (207 lb 14.3 oz)  Energy Calorie Requirements (kcal): 2829 (30kcal/kg)  Energy Need Method: Kcal/kg  Protein Requirements: 141 (1.5g/kg)  Weight Used For Protein Calculations: 94.3 kg (207 lb 14.3 oz) (141)  Fluid Requirements (mL): 2829 (1mL/kcal)  Estimated Fluid Requirement Method: RDA Method  RDA Method (mL): 2829    Nutrition Prescription Ordered    Current Diet Order: NPO  Oral Nutrition Supplement: None    Evaluation of Received Nutrient/Fluid Intake    % Kcal Needs: 0%  % Protein Needs: 0%  I/O: +194  Energy Calories Required: not meeting needs  Protein Required: not meeting needs  Tolerance: not tolerating  % Intake of Estimated Energy Needs: 0 - 25 %  % Meal Intake: NPO    Nutrition Risk    Level of Risk/Frequency of Follow-up: high     Monitor and Evaluation    Food and Nutrient Intake: energy intake, food and beverage intake  Food and Nutrient Adminstration: diet order  Knowledge/Beliefs/Attitudes: food and nutrition knowledge/skill, beliefs and attitudes  Physical Activity and Function: nutrition-related ADLs and IADLs  Anthropometric Measurements: height/length, weight, weight change, body mass index  Biochemical Data, Medical Tests and Procedures: electrolyte and renal panel, gastrointestinal profile, glucose/endocrine profile, lipid profile, inflammatory profile  Nutrition-Focused Physical Findings: overall appearance     Nutrition Follow-Up    RD Follow-up?: No

## 2024-08-08 NOTE — ASSESSMENT & PLAN NOTE
CXR reviewed. Continue IV zosyn.     Antibiotics (From admission, onward)      Start     Stop Route Frequency Ordered    08/07/24 1500  piperacillin-tazobactam (ZOSYN) 4.5 g in D5W 100 mL IVPB (MB+)         -- IV Every 8 hours (non-standard times) 08/07/24 1434            Microbiology Results (last 7 days)       ** No results found for the last 168 hours. **

## 2024-08-08 NOTE — PROGRESS NOTES
[facility]  General Surgery  Progress Note    Subjective:     History of Present Illness:  No notes on file    Post-Op Info:  Procedure(s) (LRB):  EGD (ESOPHAGOGASTRODUODENOSCOPY) (N/A)   1 Day Post-Op     Interval History:  Patient was seen at labs into SVT.  S/D this afternoon. Remains NPO. Poorly tolerant of secretions.    Medications:  Continuous Infusions:   0/9% NACL & POTASSIUM CHLORIDE 20 MEQ/L   Intravenous Continuous         Scheduled Meds:   adenosine  12 mg Intravenous Once    dexAMETHasone  3 mg Intravenous Daily    EScitalopram oxalate  10 mg Oral Daily    levalbuterol  1.25 mg Nebulization Q6H WAKE    metoprolol  5 mg Intravenous Once    metoprolol tartrate  25 mg Oral BID    pantoprazole  40 mg Intravenous Daily    piperacillin-tazobactam (Zosyn) IV (PEDS and ADULTS) (extended infusion is not appropriate)  4.5 g Intravenous Q8H    potassium chloride  20 mEq Intravenous Once    SUMAtriptan succinate  6 mg Subcutaneous Once     PRN Meds:  Current Facility-Administered Medications:     acetaminophen, 650 mg, Oral, Q8H PRN    guaiFENesin-codeine 100-10 mg/5 ml, 5 mL, Oral, Q4H PRN    LIDOcaine (PF) 10 mg/ml (1%), 1 mL, Intradermal, Once PRN    morphine, 1 mg, Intravenous, Q6H PRN    ondansetron, 4 mg, Intravenous, Q12H PRN    prochlorperazine, 5 mg, Intravenous, Q6H PRN    promethazine (PHENERGAN) 25 mg in 0.9% NaCl 50 mL IVPB, 25 mg, Intravenous, Q6H PRN    sodium chloride 0.9%, 10 mL, Intravenous, PRN     Review of patient's allergies indicates:  No Known Allergies  Objective:     Vital Signs (Most Recent):  Temp: 98.6 °F (37 °C) (08/08/24 1118)  Pulse: (!) 174 (08/08/24 1241)  Resp: 18 (08/08/24 1118)  BP: (!) 99/51 (08/08/24 1118)  SpO2: 95 % (08/08/24 1118) Vital Signs (24h Range):  Temp:  [98 °F (36.7 °C)-98.6 °F (37 °C)] 98.6 °F (37 °C)  Pulse:  [] 174  Resp:  [16-20] 18  SpO2:  [88 %-95 %] 95 %  BP: ()/(51-87) 99/51     Weight: 94.3 kg (207 lb 14.3 oz)  Body mass index is  29.83 kg/m².    Intake/Output - Last 3 Shifts         08/06 0700  08/07 0659 08/07 0700  08/08 0659 08/08 0700  08/09 0659    P.O.  0     I.V. (mL/kg)  288.8 (3.1)     IV Piggyback  197.3     Total Intake(mL/kg)  486.1 (5.2)     Emesis/NG output  50     Total Output  50     Net  +436.1            Urine Occurrence  3 x     Stool Occurrence 1 x               Physical Exam  Vitals reviewed.   Constitutional:       General: He is not in acute distress.     Appearance: He is not ill-appearing or toxic-appearing.   Cardiovascular:      Rate and Rhythm: Normal rate and regular rhythm.   Pulmonary:      Effort: Pulmonary effort is normal. No respiratory distress.      Comments: Sat > 90% on 2L NC  Abdominal:      General: There is no distension.      Palpations: Abdomen is soft.      Tenderness: There is no abdominal tenderness. There is no guarding or rebound.   Musculoskeletal:      Right lower leg: No edema.      Left lower leg: No edema.   Skin:     General: Skin is warm and dry.      Capillary Refill: Capillary refill takes less than 2 seconds.   Neurological:      Mental Status: He is alert. Mental status is at baseline.          Significant Labs:  I have reviewed all pertinent lab results within the past 24 hours.  CBC:   Recent Labs   Lab 08/08/24  0608   WBC 12.55   RBC 4.14*   HGB 13.1*   HCT 39.3*   *   MCV 95   MCH 31.6*   MCHC 33.3     CMP:   Recent Labs   Lab 08/08/24  0608   *   CALCIUM 8.7   ALBUMIN 3.0*   PROT 6.0      K 3.3*   CO2 24      BUN 15   CREATININE 1.2   ALKPHOS 47*   ALT 25   AST 15   BILITOT 2.4*       Significant Diagnostics:  I have reviewed all pertinent imaging results/findings within the past 24 hours.  Assessment/Plan:     * Aspiration pneumonia  CXR this AM with worsening left middle lobe opacities   Resp stable on 2L NC   Continue IV Zosyn     Esophageal stricture  -Patient with stricture at the GE junction boarding on complete obstruction due to patient's  intolerance of secretions  -recommend transfer for gastroenterology evaluation  -Keep NPO   -Head of bed 30 degrees     SVT (supraventricular tachycardia)  Cardiology following  S/p Adenosine cardioversion this afternoon   HD stable         Gail Chandler MD  General Surgery  [facility]

## 2024-08-08 NOTE — NURSING
Reported to DARRIAN Lagos that pulse is 167 and bp 112/58. Informed her that pt spouse concerned about giving adenosine. Jenny will consult with Solet about situation and will call me back/

## 2024-08-08 NOTE — NURSING
Pt resting well at this time Spoke with Dr Chandler and Zach about pt tranferring to [facility]. Dr. Chandler is unable to  perform surgery on patient due to multiple complications and needs to be followed by other specialties. Discussed this with wife and pt. Heart rate is now sustaining at 81 NSR.

## 2024-08-08 NOTE — SUBJECTIVE & OBJECTIVE
Interval History:  Patient was seen at labs into SVT.  S/D this afternoon. Remains NPO. Poorly tolerant of secretions.    Medications:  Continuous Infusions:   0/9% NACL & POTASSIUM CHLORIDE 20 MEQ/L   Intravenous Continuous         Scheduled Meds:   adenosine  12 mg Intravenous Once    dexAMETHasone  3 mg Intravenous Daily    EScitalopram oxalate  10 mg Oral Daily    levalbuterol  1.25 mg Nebulization Q6H WAKE    metoprolol  5 mg Intravenous Once    metoprolol tartrate  25 mg Oral BID    pantoprazole  40 mg Intravenous Daily    piperacillin-tazobactam (Zosyn) IV (PEDS and ADULTS) (extended infusion is not appropriate)  4.5 g Intravenous Q8H    potassium chloride  20 mEq Intravenous Once    SUMAtriptan succinate  6 mg Subcutaneous Once     PRN Meds:  Current Facility-Administered Medications:     acetaminophen, 650 mg, Oral, Q8H PRN    guaiFENesin-codeine 100-10 mg/5 ml, 5 mL, Oral, Q4H PRN    LIDOcaine (PF) 10 mg/ml (1%), 1 mL, Intradermal, Once PRN    morphine, 1 mg, Intravenous, Q6H PRN    ondansetron, 4 mg, Intravenous, Q12H PRN    prochlorperazine, 5 mg, Intravenous, Q6H PRN    promethazine (PHENERGAN) 25 mg in 0.9% NaCl 50 mL IVPB, 25 mg, Intravenous, Q6H PRN    sodium chloride 0.9%, 10 mL, Intravenous, PRN     Review of patient's allergies indicates:  No Known Allergies  Objective:     Vital Signs (Most Recent):  Temp: 98.6 °F (37 °C) (08/08/24 1118)  Pulse: (!) 174 (08/08/24 1241)  Resp: 18 (08/08/24 1118)  BP: (!) 99/51 (08/08/24 1118)  SpO2: 95 % (08/08/24 1118) Vital Signs (24h Range):  Temp:  [98 °F (36.7 °C)-98.6 °F (37 °C)] 98.6 °F (37 °C)  Pulse:  [] 174  Resp:  [16-20] 18  SpO2:  [88 %-95 %] 95 %  BP: ()/(51-87) 99/51     Weight: 94.3 kg (207 lb 14.3 oz)  Body mass index is 29.83 kg/m².    Intake/Output - Last 3 Shifts         08/06 0700 08/07 0659 08/07 0700 08/08 0659 08/08 0700 08/09 0659    P.O.  0     I.V. (mL/kg)  288.8 (3.1)     IV Piggyback  197.3     Total Intake(mL/kg)  486.1  (5.2)     Emesis/NG output  50     Total Output  50     Net  +436.1            Urine Occurrence  3 x     Stool Occurrence 1 x               Physical Exam  Vitals reviewed.   Constitutional:       General: He is not in acute distress.     Appearance: He is not ill-appearing or toxic-appearing.   Cardiovascular:      Rate and Rhythm: Normal rate and regular rhythm.   Pulmonary:      Effort: Pulmonary effort is normal. No respiratory distress.      Comments: Sat > 90% on 2L NC  Abdominal:      General: There is no distension.      Palpations: Abdomen is soft.      Tenderness: There is no abdominal tenderness. There is no guarding or rebound.   Musculoskeletal:      Right lower leg: No edema.      Left lower leg: No edema.   Skin:     General: Skin is warm and dry.      Capillary Refill: Capillary refill takes less than 2 seconds.   Neurological:      Mental Status: He is alert. Mental status is at baseline.          Significant Labs:  I have reviewed all pertinent lab results within the past 24 hours.  CBC:   Recent Labs   Lab 08/08/24  0608   WBC 12.55   RBC 4.14*   HGB 13.1*   HCT 39.3*   *   MCV 95   MCH 31.6*   MCHC 33.3     CMP:   Recent Labs   Lab 08/08/24  0608   *   CALCIUM 8.7   ALBUMIN 3.0*   PROT 6.0      K 3.3*   CO2 24      BUN 15   CREATININE 1.2   ALKPHOS 47*   ALT 25   AST 15   BILITOT 2.4*       Significant Diagnostics:  I have reviewed all pertinent imaging results/findings within the past 24 hours.

## 2024-08-08 NOTE — CONSULTS
[facility]  Consult Note    Patient Name: Sukumar Wiseman  MRN: [id number]  Admission Date: 8/7/2024  Hospital Length of Stay: 0 days  Attending Physician: Gail Chandler MD   Primary Care Provider: Larry Nicholas Jr., MD           Patient information was obtained from ER records.     Consults  Subjective:     Principal Problem: Aspiration pneumonia    Chief Complaint: No chief complaint on file.       HPI: Patient presented for EGD for esophageal stricture and dilation. Patient found with food present in esophagus, began vomiting during procedure. Procedure stopped due to risk of aspiration. Patient found to have low oxygen saturation post op. Patient admitted and started on broad spectrum IV antibiotics, requiring supplemental oxygen.     Past Medical History:   Diagnosis Date    Cancer 12/21/2023    Headache     Hypertension     145/93,  not medicated    SVT (supraventricular tachycardia)        Past Surgical History:   Procedure Laterality Date    SUBOCCIPITAL CRANIOTOMY Right 01/04/2024    Procedure: CRANIOTOMY, SUBOCCIPITAL MIS;  Surgeon: Luis Wallace MD;  Location: [facility] OR [address] [place] [address];  Service: Neurosurgery;  Laterality: Right;       Review of patient's allergies indicates:  No Known Allergies    Current Facility-Administered Medications on File Prior to Encounter   Medication    mupirocin 2 % ointment     Current Outpatient Medications on File Prior to Encounter   Medication Sig    dexAMETHasone (DECADRON) 4 MG Tab --a single 2mg tablet every 6 hours for 3 days --a single 2mg tablet every 8 hours for 3 days --a single 2mg tablet every 12 hours for 4 days --a single 2mg tablet every morning for 4 days (Patient taking differently: --a single 2mg tablet every 6 hours for 3 days --a single 2mg tablet every 8 hours for 3 days --a single 2mg tablet every 12 hours for 4 days --a single 2mg tablet every morning for 4 days    Pt taking once daily 4mg as of 1.24.24)    EScitalopram  oxalate (LEXAPRO) 10 MG tablet Take 1 tablet (10 mg total) by mouth once daily.    lomustine (GLEOSTINE) 100 MG capsule Take 200 mg (2 capsules) by mouth at bedtime on day 1 of each 42 day cycle.    metoprolol tartrate (LOPRESSOR) 25 MG tablet Take 25 mg by mouth 2 (two) times daily.    pantoprazole (PROTONIX) 40 MG tablet Take 40 mg by mouth once daily.    promethazine (PHENERGAN) 12.5 MG Tab Take 12.5 mg by mouth every 4 (four) hours as needed.    traMADoL (ULTRAM) 50 mg tablet Take 50 mg by mouth.    acetaminophen (TYLENOL) 500 MG tablet Take 500 mg by mouth every 4 (four) hours as needed.    LORazepam (ATIVAN) 0.5 MG tablet Take 0.5 mg by mouth daily as needed.    ondansetron (ZOFRAN) 8 MG tablet Take 8 mg by mouth every 8 (eight) hours as needed.    prochlorperazine (COMPAZINE) 10 MG tablet Take 10 mg by mouth once.     Family History       Problem Relation (Age of Onset)    Cancer Maternal Grandfather    Colon cancer Maternal Grandfather    Hyperlipidemia Mother    Hypertension Mother, Father          Tobacco Use    Smoking status: Never     Passive exposure: Never    Smokeless tobacco: Never   Substance and Sexual Activity    Alcohol use: Not Currently    Drug use: Never    Sexual activity: Yes     Partners: Female     Birth control/protection: None     Review of Systems   Constitutional:  Negative for chills and fever.   HENT:  Negative for rhinorrhea, sneezing and sore throat.    Eyes:  Negative for pain and visual disturbance.   Respiratory:  Negative for cough and shortness of breath.    Cardiovascular:  Negative for chest pain.   Gastrointestinal:  Negative for abdominal pain, constipation and diarrhea.   Endocrine: Negative for cold intolerance and heat intolerance.   Genitourinary:  Negative for difficulty urinating.   Musculoskeletal:  Negative for arthralgias and joint swelling.   Skin:  Negative for rash.   Allergic/Immunologic: Negative for environmental allergies.   Neurological:  Negative for  dizziness, syncope and weakness.   Hematological:  Does not bruise/bleed easily.   Psychiatric/Behavioral:  Negative for dysphoric mood. The patient is not nervous/anxious.      Objective:     Vital Signs (Most Recent):  Temp: 98.3 °F (36.8 °C) (08/08/24 0423)  Pulse: (!) 167 (08/08/24 0900)  Resp: 18 (08/08/24 0727)  BP: (!) 112/58 (08/08/24 0900)  SpO2: (!) 92 % (08/08/24 0727) Vital Signs (24h Range):  Temp:  [97.9 °F (36.6 °C)-98.8 °F (37.1 °C)] 98.3 °F (36.8 °C)  Pulse:  [] 167  Resp:  [16-20] 18  SpO2:  [88 %-95 %] 92 %  BP: (108-138)/(58-87) 112/58     Weight: 94.3 kg (207 lb 14.3 oz)  Body mass index is 29.83 kg/m².     Physical Exam  Vitals and nursing note reviewed.   Constitutional:       Appearance: Normal appearance. He is obese.   HENT:      Head: Normocephalic and atraumatic.      Nose: Nose normal.   Eyes:      Extraocular Movements: Extraocular movements intact.      Pupils: Pupils are equal, round, and reactive to light.   Cardiovascular:      Rate and Rhythm: Regular rhythm. Tachycardia present.      Heart sounds: No murmur heard.     No friction rub. No gallop.   Pulmonary:      Effort: Pulmonary effort is normal.      Breath sounds: Rhonchi present. No wheezing or rales.      Comments: Supplemental oxygen in place via NC.   Abdominal:      General: Abdomen is flat. Bowel sounds are normal.      Palpations: Abdomen is soft.   Musculoskeletal:         General: No swelling or deformity.      Cervical back: Normal range of motion and neck supple.   Skin:     General: Skin is warm and dry.      Capillary Refill: Capillary refill takes less than 2 seconds.   Neurological:      General: No focal deficit present.      Mental Status: He is alert and oriented to person, place, and time.   Psychiatric:         Mood and Affect: Mood normal.         Behavior: Behavior normal.          Significant Labs: All pertinent labs within the past 24 hours have been reviewed.    Significant Imaging: I have  reviewed all pertinent imaging results/findings within the past 24 hours.  Assessment/Plan:     * Aspiration pneumonia  CXR reviewed. Continue IV zosyn.     Antibiotics (From admission, onward)      Start     Stop Route Frequency Ordered    08/07/24 1500  piperacillin-tazobactam (ZOSYN) 4.5 g in D5W 100 mL IVPB (MB+)         -- IV Every 8 hours (non-standard times) 08/07/24 1434            Microbiology Results (last 7 days)       ** No results found for the last 168 hours. **            Esophageal stricture  Continue management per GS.       SVT (supraventricular tachycardia)  Continue management per cardiology recs.       Gastroesophageal reflux disease without esophagitis  Continue IV PPI.     Cerebellar mass  Will start home dose decadron.       KIM (generalized anxiety disorder)  On home dose lexapro. Will follow.       VTE Risk Mitigation (From admission, onward)           Ordered     IP VTE HIGH RISK PATIENT  Once         08/07/24 1220     Place sequential compression device  Until discontinued         08/07/24 1220                        Thank you for your consult. I will follow-up with patient. Please contact us if you have any additional questions.    Larry Nicholas Jr, MD  Department of Hospital Medicine   [facility]

## 2024-08-08 NOTE — NURSING
2100: Pt vomiting, unable to keep down PO medication, metoprolol held. Pt received IV phenergan w/ full relief of nausea and vomiting.     0500: Pt presenting w/ -190s. RN at the bedside. Telemetry in place. Pt on 2L via NC w/ sats> 95%. Pt denies chest pain at this time. PO metoprolol administered to pt. HR continues to be greater than 160. Dr. Chandler notified. Stat EKG ordered and completed. 2.5 mg of IV metoprolol ordered and administered. Pt HR continues in the 160-190s. Spoke w/ charge nurse, house supervisor, and Dr. Chandler. Another 2.5 mg of IV metoprolol ordered and administered to pt. Chest xray completed. Dr. Nicholas also notified and updated on events.

## 2024-08-08 NOTE — ASSESSMENT & PLAN NOTE
-Patient with stricture at the GE junction boarding on complete obstruction due to patient's intolerance of secretions  -recommend transfer for gastroenterology evaluation  -Keep NPO   -Head of bed 30 degrees

## 2024-08-08 NOTE — CONSULTS
[facility] Medicine  Consult Note    Patient Name: Sukumar Wiseman  MRN: [id number]  Admission Date: 8/7/2024  Hospital Length of Stay: 0 days  Attending Physician: Gail Chandler MD   Primary Care Provider: Larry Nicholas Jr., MD           Patient information was obtained from patient and ER records.     Consults  Subjective:     Principal Problem: Aspiration pneumonia    Chief Complaint: No chief complaint on file.       HPI: Patient presented for EGD for esophageal stricture and dilation. Patient found with food present in esophagus, began vomiting during procedure. Procedure stopped due to risk of aspiration. Patient found to have low oxygen saturation post op. Patient admitted and started on broad spectrum IV antibiotics, requiring supplemental oxygen.     Past Medical History:   Diagnosis Date    Cancer 12/21/2023    Headache     Hypertension     145/93,  not medicated    SVT (supraventricular tachycardia)        Past Surgical History:   Procedure Laterality Date    SUBOCCIPITAL CRANIOTOMY Right 01/04/2024    Procedure: CRANIOTOMY, SUBOCCIPITAL MIS;  Surgeon: Luis Wallace MD;  Location: [facility] OR [address] [place] [address];  Service: Neurosurgery;  Laterality: Right;       Review of patient's allergies indicates:  No Known Allergies    Current Facility-Administered Medications on File Prior to Encounter   Medication    mupirocin 2 % ointment     Current Outpatient Medications on File Prior to Encounter   Medication Sig    dexAMETHasone (DECADRON) 4 MG Tab --a single 2mg tablet every 6 hours for 3 days --a single 2mg tablet every 8 hours for 3 days --a single 2mg tablet every 12 hours for 4 days --a single 2mg tablet every morning for 4 days (Patient taking differently: --a single 2mg tablet every 6 hours for 3 days --a single 2mg tablet every 8 hours for 3 days --a single 2mg tablet every 12 hours for 4 days --a single 2mg tablet every morning for 4 days    Pt taking once daily 4mg as of  1.24.24)    EScitalopram oxalate (LEXAPRO) 10 MG tablet Take 1 tablet (10 mg total) by mouth once daily.    lomustine (GLEOSTINE) 100 MG capsule Take 200 mg (2 capsules) by mouth at bedtime on day 1 of each 42 day cycle.    metoprolol tartrate (LOPRESSOR) 25 MG tablet Take 25 mg by mouth 2 (two) times daily.    pantoprazole (PROTONIX) 40 MG tablet Take 40 mg by mouth once daily.    promethazine (PHENERGAN) 12.5 MG Tab Take 12.5 mg by mouth every 4 (four) hours as needed.    traMADoL (ULTRAM) 50 mg tablet Take 50 mg by mouth.    acetaminophen (TYLENOL) 500 MG tablet Take 500 mg by mouth every 4 (four) hours as needed.    LORazepam (ATIVAN) 0.5 MG tablet Take 0.5 mg by mouth daily as needed.    ondansetron (ZOFRAN) 8 MG tablet Take 8 mg by mouth every 8 (eight) hours as needed.    prochlorperazine (COMPAZINE) 10 MG tablet Take 10 mg by mouth once.     Family History       Problem Relation (Age of Onset)    Cancer Maternal Grandfather    Colon cancer Maternal Grandfather    Hyperlipidemia Mother    Hypertension Mother, Father          Tobacco Use    Smoking status: Never     Passive exposure: Never    Smokeless tobacco: Never   Substance and Sexual Activity    Alcohol use: Not Currently    Drug use: Never    Sexual activity: Yes     Partners: Female     Birth control/protection: None     Review of Systems   Constitutional:  Negative for chills and fever.   HENT:  Negative for rhinorrhea, sneezing and sore throat.    Eyes:  Negative for pain and visual disturbance.   Respiratory:  Negative for cough and shortness of breath.    Cardiovascular:  Negative for chest pain.   Gastrointestinal:  Negative for abdominal pain, constipation and diarrhea.   Endocrine: Negative for cold intolerance and heat intolerance.   Genitourinary:  Negative for difficulty urinating.   Musculoskeletal:  Negative for arthralgias and joint swelling.   Skin:  Negative for rash.   Allergic/Immunologic: Negative for environmental  allergies.   Neurological:  Negative for dizziness, syncope and weakness.   Hematological:  Does not bruise/bleed easily.   Psychiatric/Behavioral:  Negative for dysphoric mood. The patient is not nervous/anxious.      Objective:     Vital Signs (Most Recent):  Temp: 98.3 °F (36.8 °C) (08/08/24 0423)  Pulse: (!) 167 (08/08/24 0900)  Resp: 18 (08/08/24 0727)  BP: (!) 112/58 (08/08/24 0900)  SpO2: (!) 92 % (08/08/24 0727) Vital Signs (24h Range):  Temp:  [97.9 °F (36.6 °C)-98.8 °F (37.1 °C)] 98.3 °F (36.8 °C)  Pulse:  [] 167  Resp:  [16-20] 18  SpO2:  [88 %-95 %] 92 %  BP: (108-138)/(58-87) 112/58     Weight: 94.3 kg (207 lb 14.3 oz)  Body mass index is 29.83 kg/m².     Physical Exam  Vitals and nursing note reviewed.   Constitutional:       Appearance: Normal appearance. He is obese.   HENT:      Head: Normocephalic and atraumatic.      Nose: Nose normal.   Eyes:      Extraocular Movements: Extraocular movements intact.      Pupils: Pupils are equal, round, and reactive to light.   Cardiovascular:      Rate and Rhythm: Normal rate and regular rhythm.      Heart sounds: No murmur heard.     No friction rub. No gallop.   Pulmonary:      Effort: Pulmonary effort is normal.      Breath sounds: Normal breath sounds. No wheezing or rhonchi.      Comments: Supplemental oxygen in place via NC.   Abdominal:      General: Abdomen is flat. Bowel sounds are normal.      Palpations: Abdomen is soft.   Musculoskeletal:         General: No swelling or deformity.      Cervical back: Normal range of motion and neck supple.   Skin:     General: Skin is warm and dry.      Capillary Refill: Capillary refill takes less than 2 seconds.   Neurological:      General: No focal deficit present.      Mental Status: He is alert and oriented to person, place, and time.   Psychiatric:         Mood and Affect: Mood normal.         Behavior: Behavior normal.          Significant Labs: All pertinent labs within the past 24 hours have been  reviewed.    Significant Imaging: I have reviewed all pertinent imaging results/findings within the past 24 hours.  Assessment/Plan:     * Aspiration pneumonia  CXR reviewed. Continue IV zosyn.     Antibiotics (From admission, onward)      Start     Stop Route Frequency Ordered    08/07/24 1500  piperacillin-tazobactam (ZOSYN) 4.5 g in D5W 100 mL IVPB (MB+)         -- IV Every 8 hours (non-standard times) 08/07/24 1434            Microbiology Results (last 7 days)       ** No results found for the last 168 hours. **            Esophageal stricture  Continue management per GS.       SVT (supraventricular tachycardia)  Continue management per cardiology recs.       Gastroesophageal reflux disease without esophagitis  Continue IV PPI.     Cerebellar mass  Will start home dose decadron.       KIM (generalized anxiety disorder)  On home dose lexapro. Will follow.       VTE Risk Mitigation (From admission, onward)           Ordered     IP VTE HIGH RISK PATIENT  Once         08/07/24 1220     Place sequential compression device  Until discontinued         08/07/24 1220                        Thank you for your consult. I will follow-up with patient. Please contact us if you have any additional questions.    Larry Nicholas Jr, MD  Department of Hospital Medicine   [facility] Surg

## 2024-08-08 NOTE — CONSULTS
[facility] Surg  Cardiology  Consult Note    Patient Name: Sukumar Wiseman  MRN: [id number]  Admission Date: 8/7/2024  Hospital Length of Stay: 0 days  Code Status: Full Code   Attending Provider: Gail Chandler MD   Consulting Provider: Jasen Espinal MD  Primary Care Physician: Larry Nicholas Jr., MD  Principal Problem:Aspiration pneumonia    Patient information was obtained from patient, past medical records, and ER records.     Inpatient consult to Cardiology  Consult performed by: Jenny Mccord PA-C  Consult ordered by: Gail Chandler MD      Consult performed by: Jasen Espinal MD  Subjective:     Chief Complaint:  SVT     HPI:   This is a 39 yo male with pmhx brain tumor, SVT, and esophageal stricture who is currently admitted post EGD/esophageal dilation. This morning he went into SVT with sustained HR > 170s since approximately 6:00 am. He was given Lopressor 2.5 mg IV x 2 doses with no improvement. Pt reports history of SVT for which he takes metoprolol tartrate twice daily; however, question to if he is getting full dosage due to esophageal stricture and aspiration. Vagal maneuvers attempted; unsuccessful.     Pt is currently hemodynamically stable and asymptomatic. We will give 5 mg IV lopressor now, with a second dose in 15-20 mins if not improvement. If after second dose if HR remains elevated, will plan for adenosine.    8/8/24 - 1:14 PM  This morning, pt's remained sustained HR 170s after 5 mg IV Lopressor, 2.5 mg IV Lopressor, and 500 cc bolus NS. BP became hypotensive and pt's hemodynamics slowly became unstable. Pt was given 6 mg Adenosine IV push with flush. Crash cart at beside with pads attached. Post adenosine, HR improved to SR 90s. Pt stable, tolerated well. No immediate complications.     Past Medical History:   Diagnosis Date    Cancer 12/21/2023    Headache     Hypertension     145/93,  not medicated    SVT (supraventricular tachycardia)        Past Surgical History:    Procedure Laterality Date    ESOPHAGOGASTRODUODENOSCOPY N/A 8/7/2024    Procedure: EGD (ESOPHAGOGASTRODUODENOSCOPY);  Surgeon: Gail Chandler MD;  Location: [facility];  Service: General;  Laterality: N/A;  with dilation  1st  @ 6:00  EGD ATTEMPTED; ABORTED    SUBOCCIPITAL CRANIOTOMY Right 01/04/2024    Procedure: CRANIOTOMY, SUBOCCIPITAL MIS;  Surgeon: Luis Wallace MD;  Location: [address];  Service: Neurosurgery;  Laterality: Right;       Review of patient's allergies indicates:  No Known Allergies    Current Facility-Administered Medications on File Prior to Encounter   Medication    mupirocin 2 % ointment     Current Outpatient Medications on File Prior to Encounter   Medication Sig    dexAMETHasone (DECADRON) 4 MG Tab --a single 2mg tablet every 6 hours for 3 days --a single 2mg tablet every 8 hours for 3 days --a single 2mg tablet every 12 hours for 4 days --a single 2mg tablet every morning for 4 days (Patient taking differently: --a single 2mg tablet every 6 hours for 3 days --a single 2mg tablet every 8 hours for 3 days --a single 2mg tablet every 12 hours for 4 days --a single 2mg tablet every morning for 4 days    Pt taking once daily 4mg as of 1.24.24)    EScitalopram oxalate (LEXAPRO) 10 MG tablet Take 1 tablet (10 mg total) by mouth once daily.    lomustine (GLEOSTINE) 100 MG capsule Take 200 mg (2 capsules) by mouth at bedtime on day 1 of each 42 day cycle.    metoprolol tartrate (LOPRESSOR) 25 MG tablet Take 25 mg by mouth 2 (two) times daily.    pantoprazole (PROTONIX) 40 MG tablet Take 40 mg by mouth once daily.    promethazine (PHENERGAN) 12.5 MG Tab Take 12.5 mg by mouth every 4 (four) hours as needed.    traMADoL (ULTRAM) 50 mg tablet Take 50 mg by mouth.    acetaminophen (TYLENOL) 500 MG tablet Take 500 mg by mouth every 4 (four) hours as needed.    LORazepam (ATIVAN) 0.5 MG tablet Take 0.5 mg by mouth daily as needed.    ondansetron (ZOFRAN) 8 MG tablet Take 8 mg by mouth every  8 (eight) hours as needed.    prochlorperazine (COMPAZINE) 10 MG tablet Take 10 mg by mouth once.     Family History       Problem Relation (Age of Onset)    Cancer Maternal Grandfather    Colon cancer Maternal Grandfather    Hyperlipidemia Mother    Hypertension Mother, Father          Tobacco Use    Smoking status: Never     Passive exposure: Never    Smokeless tobacco: Never   Substance and Sexual Activity    Alcohol use: Not Currently    Drug use: Never    Sexual activity: Yes     Partners: Female     Birth control/protection: None     Review of Systems   Cardiovascular:  Positive for palpitations. Negative for chest pain, dyspnea on exertion, irregular heartbeat, leg swelling, orthopnea and syncope.   Respiratory:  Negative for shortness of breath.    Neurological:  Negative for dizziness, light-headedness and weakness.   All other systems reviewed and are negative.    Objective:     Vital Signs (Most Recent):  Temp: 98 °F (36.7 °C) (08/08/24 1249)  Pulse: 86 (08/08/24 1506)  Resp: 18 (08/08/24 1336)  BP: 105/72 (08/08/24 1249)  SpO2: 95 % (08/08/24 1336) Vital Signs (24h Range):  Temp:  [98 °F (36.7 °C)-98.6 °F (37 °C)] 98 °F (36.7 °C)  Pulse:  [] 86  Resp:  [18] 18  SpO2:  [92 %-96 %] 95 %  BP: ()/(51-87) 105/72     Weight: 94.3 kg (207 lb 14.3 oz)  Body mass index is 29.83 kg/m².    SpO2: 95 %         Intake/Output Summary (Last 24 hours) at 8/8/2024 1643  Last data filed at 8/8/2024 0452  Gross per 24 hour   Intake 292.05 ml   Output 50 ml   Net 242.05 ml       Lines/Drains/Airways       Peripheral Intravenous Line  Duration                  Peripheral IV - Single Lumen 08/07/24 0706 20 G Anterior;Proximal;Right Forearm 1 day         Peripheral IV - Single Lumen 08/08/24 1319 20 G Anterior;Distal;Left Forearm <1 day                    Physical Exam  Vitals and nursing note reviewed.   Constitutional:       Appearance: Normal appearance. He is obese.   HENT:      Head: Normocephalic and  atraumatic.      Nose: Nose normal.      Mouth/Throat:      Mouth: Mucous membranes are moist.      Pharynx: Oropharynx is clear.   Eyes:      Extraocular Movements: Extraocular movements intact.   Cardiovascular:      Rate and Rhythm: Regular rhythm. Tachycardia present.      Pulses: Normal pulses.      Heart sounds: Normal heart sounds. No murmur heard.     No friction rub. No gallop.   Pulmonary:      Effort: Pulmonary effort is normal.      Breath sounds: Normal breath sounds. No wheezing, rhonchi or rales.   Musculoskeletal:      Right lower leg: No edema.      Left lower leg: No edema.   Skin:     Capillary Refill: Capillary refill takes less than 2 seconds.   Neurological:      Mental Status: He is alert and oriented to person, place, and time.         Significant Labs: BMP:   Recent Labs   Lab 08/07/24 0627 08/08/24 0608   GLU 88 119*    141   K 3.4* 3.3*    104   CO2 31* 24   BUN 10 15   CREATININE 0.9 1.2   CALCIUM 8.9 8.7   MG  --  1.9   , CMP   Recent Labs   Lab 08/07/24 0627 08/08/24 0608    141   K 3.4* 3.3*    104   CO2 31* 24   GLU 88 119*   BUN 10 15   CREATININE 0.9 1.2   CALCIUM 8.9 8.7   PROT 6.9 6.0   ALBUMIN 3.5 3.0*   BILITOT 1.0 2.4*   ALKPHOS 48* 47*   AST 9* 15   ALT 34 25   ANIONGAP 7 13*   , CBC   Recent Labs   Lab 08/07/24 0627 08/08/24  0608   WBC 5.18 12.55   HGB 13.4* 13.1*   HCT 39.6* 39.3*   * 108*   , and All pertinent lab results from the last 24 hours have been reviewed.    Assessment and Plan:    SVT   -sustained HR 170s; hemodynamically stable  -given Lopressor IV 2.5 mg x 2 doses   -given Lopressor IV 5 mg IV x 1 dose - /80s   -HR remains sustained 160s; /65 - give Lopressor IV 2.5 mg x 1 dose  -continue to monitor closely  -will reassess around noon, if HR remains sustained, will give adenosine.     12:44 pm   Sustained SVT HR 170s despite IV lopressor, IV NS bolus, and vagal maneuvers.   Successful chemical cardioversion with  adenosine 6 mg IV. Pt tolerated well with no immediate complications. HR improved to sinus rhythm, 90s.     Hypokalemia   -K 3.3   -replace as needed to keep K>4, Mg>2       Active Diagnoses:    Diagnosis Date Noted POA    PRINCIPAL PROBLEM:  Aspiration pneumonia [J69.0] 08/08/2024 Unknown    Esophageal stricture [K22.2] 08/07/2024 Yes    SVT (supraventricular tachycardia) [I47.10] 04/04/2024 Yes    Cerebellar mass [G93.89] 01/04/2024 Yes    KIM (generalized anxiety disorder) [F41.1] 03/18/2021 Yes      Problems Resolved During this Admission:       VTE Risk Mitigation (From admission, onward)           Ordered     IP VTE HIGH RISK PATIENT  Once         08/07/24 1220     Place sequential compression device  Until discontinued         08/07/24 1220                    Thank you for your consult.     DARRIAN Aguilera-C - scribed for Dr. Espinal   Cardiology     Provider's Attestation:  I, Jasen Espinal MD, confirm that DARRIAN Lagos worked under my direction at all times.  Documentation shall reflect findings and decisions made by myself in the course of the patient's treatment.  I have performed a face to face evaluation of the patient and reviewed the medical record. In addition, I have performed the substantive portion of the medical decision making. I have reviewed the notes and assessment, and I concur with her documentation.  CMS guidelines regarding the use of scribes shall be adhered to.  MD Jasen Duke MD  Cardiology   [facility] Surg

## 2024-08-09 VITALS
HEART RATE: 72 BPM | TEMPERATURE: 98 F | BODY MASS INDEX: 29.76 KG/M2 | WEIGHT: 207.88 LBS | DIASTOLIC BLOOD PRESSURE: 20 MMHG | HEIGHT: 70 IN | OXYGEN SATURATION: 95 % | RESPIRATION RATE: 18 BRPM | SYSTOLIC BLOOD PRESSURE: 137 MMHG

## 2024-08-09 LAB
ALBUMIN SERPL BCP-MCNC: 2.5 G/DL (ref 3.5–5.2)
ALP SERPL-CCNC: 42 U/L (ref 55–135)
ALT SERPL W/O P-5'-P-CCNC: 12 U/L (ref 10–44)
ANION GAP SERPL CALC-SCNC: 7 MMOL/L (ref 3–11)
AST SERPL-CCNC: 10 U/L (ref 10–40)
BASOPHILS # BLD AUTO: 0.01 K/UL (ref 0–0.2)
BASOPHILS NFR BLD: 0.1 % (ref 0–1.9)
BILIRUB SERPL-MCNC: 1.3 MG/DL (ref 0.1–1)
BUN SERPL-MCNC: 11 MG/DL (ref 6–20)
CALCIUM SERPL-MCNC: 8.6 MG/DL (ref 8.7–10.5)
CHLORIDE SERPL-SCNC: 103 MMOL/L (ref 95–110)
CO2 SERPL-SCNC: 30 MMOL/L (ref 23–29)
CREAT SERPL-MCNC: 1 MG/DL (ref 0.5–1.4)
DIFFERENTIAL METHOD BLD: ABNORMAL
EOSINOPHIL # BLD AUTO: 0 K/UL (ref 0–0.5)
EOSINOPHIL NFR BLD: 0.2 % (ref 0–8)
ERYTHROCYTE [DISTWIDTH] IN BLOOD BY AUTOMATED COUNT: 14.4 % (ref 11.5–14.5)
EST. GFR  (NO RACE VARIABLE): >60 ML/MIN/1.73 M^2
GLUCOSE SERPL-MCNC: 92 MG/DL (ref 70–110)
HCT VFR BLD AUTO: 35.7 % (ref 40–54)
HGB BLD-MCNC: 11.9 G/DL (ref 14–18)
IMM GRANULOCYTES # BLD AUTO: 0.04 K/UL (ref 0–0.04)
IMM GRANULOCYTES NFR BLD AUTO: 0.4 % (ref 0–0.5)
LYMPHOCYTES # BLD AUTO: 0.6 K/UL (ref 1–4.8)
LYMPHOCYTES NFR BLD: 6.6 % (ref 18–48)
MAGNESIUM SERPL-MCNC: 2 MG/DL (ref 1.6–2.6)
MCH RBC QN AUTO: 31.8 PG (ref 27–31)
MCHC RBC AUTO-ENTMCNC: 33.3 G/DL (ref 32–36)
MCV RBC AUTO: 96 FL (ref 82–98)
MONOCYTES # BLD AUTO: 0.5 K/UL (ref 0.3–1)
MONOCYTES NFR BLD: 4.9 % (ref 4–15)
NEUTROPHILS # BLD AUTO: 8.3 K/UL (ref 1.8–7.7)
NEUTROPHILS NFR BLD: 87.8 % (ref 38–73)
NRBC BLD-RTO: 0 /100 WBC
PLATELET # BLD AUTO: 88 K/UL (ref 150–450)
PMV BLD AUTO: 8.4 FL (ref 9.2–12.9)
POTASSIUM SERPL-SCNC: 3.8 MMOL/L (ref 3.5–5.1)
PROT SERPL-MCNC: 6.2 G/DL (ref 6–8.4)
RBC # BLD AUTO: 3.74 M/UL (ref 4.6–6.2)
SODIUM SERPL-SCNC: 140 MMOL/L (ref 136–145)
WBC # BLD AUTO: 9.45 K/UL (ref 3.9–12.7)

## 2024-08-09 PROCEDURE — 99900035 HC TECH TIME PER 15 MIN (STAT)

## 2024-08-09 PROCEDURE — 63600175 PHARM REV CODE 636 W HCPCS

## 2024-08-09 PROCEDURE — 85025 COMPLETE CBC W/AUTO DIFF WBC: CPT | Performed by: INTERNAL MEDICINE

## 2024-08-09 PROCEDURE — 63600175 PHARM REV CODE 636 W HCPCS: Performed by: STUDENT IN AN ORGANIZED HEALTH CARE EDUCATION/TRAINING PROGRAM

## 2024-08-09 PROCEDURE — 99900031 HC PATIENT EDUCATION (STAT)

## 2024-08-09 PROCEDURE — 94640 AIRWAY INHALATION TREATMENT: CPT

## 2024-08-09 PROCEDURE — 25000003 PHARM REV CODE 250: Performed by: STUDENT IN AN ORGANIZED HEALTH CARE EDUCATION/TRAINING PROGRAM

## 2024-08-09 PROCEDURE — 25000242 PHARM REV CODE 250 ALT 637 W/ HCPCS: Performed by: INTERNAL MEDICINE

## 2024-08-09 PROCEDURE — 83735 ASSAY OF MAGNESIUM: CPT | Performed by: INTERNAL MEDICINE

## 2024-08-09 PROCEDURE — 36415 COLL VENOUS BLD VENIPUNCTURE: CPT | Performed by: INTERNAL MEDICINE

## 2024-08-09 PROCEDURE — 99233 SBSQ HOSP IP/OBS HIGH 50: CPT | Mod: ,,, | Performed by: STUDENT IN AN ORGANIZED HEALTH CARE EDUCATION/TRAINING PROGRAM

## 2024-08-09 PROCEDURE — 63600175 PHARM REV CODE 636 W HCPCS: Performed by: INTERNAL MEDICINE

## 2024-08-09 PROCEDURE — 21400001 HC TELEMETRY ROOM

## 2024-08-09 PROCEDURE — 94761 N-INVAS EAR/PLS OXIMETRY MLT: CPT

## 2024-08-09 PROCEDURE — 27000221 HC OXYGEN, UP TO 24 HOURS

## 2024-08-09 PROCEDURE — 80053 COMPREHEN METABOLIC PANEL: CPT | Performed by: INTERNAL MEDICINE

## 2024-08-09 RX ADMIN — TRAMADOL HYDROCHLORIDE 50 MG: 50 TABLET, COATED ORAL at 07:08

## 2024-08-09 RX ADMIN — PIPERACILLIN SODIUM AND TAZOBACTAM SODIUM 4.5 G: 4; .5 INJECTION, POWDER, FOR SOLUTION INTRAVENOUS at 06:08

## 2024-08-09 RX ADMIN — SODIUM CHLORIDE AND POTASSIUM CHLORIDE: .9; .15 SOLUTION INTRAVENOUS at 11:08

## 2024-08-09 RX ADMIN — PIPERACILLIN SODIUM AND TAZOBACTAM SODIUM 4.5 G: 4; .5 INJECTION, POWDER, FOR SOLUTION INTRAVENOUS at 11:08

## 2024-08-09 RX ADMIN — LEVALBUTEROL 1.25 MG: 1.25 SOLUTION, CONCENTRATE RESPIRATORY (INHALATION) at 01:08

## 2024-08-09 RX ADMIN — LEVALBUTEROL 1.25 MG: 1.25 SOLUTION, CONCENTRATE RESPIRATORY (INHALATION) at 07:08

## 2024-08-09 RX ADMIN — ESCITALOPRAM OXALATE 10 MG: 10 TABLET ORAL at 08:08

## 2024-08-09 RX ADMIN — PANTOPRAZOLE SODIUM 40 MG: 40 INJECTION, POWDER, FOR SOLUTION INTRAVENOUS at 10:08

## 2024-08-09 RX ADMIN — PIPERACILLIN SODIUM AND TAZOBACTAM SODIUM 4.5 G: 4; .5 INJECTION, POWDER, FOR SOLUTION INTRAVENOUS at 03:08

## 2024-08-09 RX ADMIN — DEXAMETHASONE SODIUM PHOSPHATE 3 MG: 4 INJECTION INTRA-ARTICULAR; INTRALESIONAL; INTRAMUSCULAR; INTRAVENOUS; SOFT TISSUE at 10:08

## 2024-08-09 RX ADMIN — PROMETHAZINE HYDROCHLORIDE 25 MG: 25 INJECTION INTRAMUSCULAR; INTRAVENOUS at 10:08

## 2024-08-09 RX ADMIN — METOPROLOL TARTRATE 25 MG: 25 TABLET, FILM COATED ORAL at 08:08

## 2024-08-09 NOTE — PLAN OF CARE
Recommendations  1. Rec'd SLP consult.   2. Rec'd Regular diet with consistency rec's as per SLP.   3. Rec'd ONS: Ensure Enlive TID to provide 1050kcal and 60g of protein.   4. RD to follow and make rec's accordingly.  Goals:   1. Pt's diet will be advanced from NPO by next RD follow up.  2. Pt's diet will be advanced from Clear Liquids by next RD follow up.  Nutrition Goal Status: goal met; new

## 2024-08-09 NOTE — SUBJECTIVE & OBJECTIVE
Interval History:  Patient was seen at labs into SVT.  S/D this afternoon. Remains NPO. Poorly tolerant of secretions.    Medications:  Continuous Infusions:   0/9% NACL & POTASSIUM CHLORIDE 20 MEQ/L   Intravenous Continuous 100 mL/hr at 08/08/24 1315 New Bag at 08/08/24 1315     Scheduled Meds:   adenosine  12 mg Intravenous Once    dexAMETHasone  3 mg Intravenous Daily    EScitalopram oxalate  10 mg Oral Daily    levalbuterol  1.25 mg Nebulization Q6H WAKE    metoprolol  5 mg Intravenous Once    metoprolol tartrate  25 mg Oral BID    pantoprazole  40 mg Intravenous Daily    piperacillin-tazobactam (Zosyn) IV (PEDS and ADULTS) (extended infusion is not appropriate)  4.5 g Intravenous Q8H    SUMAtriptan succinate  6 mg Subcutaneous Once     PRN Meds:  Current Facility-Administered Medications:     acetaminophen, 650 mg, Oral, Q8H PRN    guaiFENesin-codeine 100-10 mg/5 ml, 5 mL, Oral, Q4H PRN    LIDOcaine (PF) 10 mg/ml (1%), 1 mL, Intradermal, Once PRN    morphine, 1 mg, Intravenous, Q6H PRN    ondansetron, 4 mg, Intravenous, Q12H PRN    prochlorperazine, 5 mg, Intravenous, Q6H PRN    promethazine (PHENERGAN) 25 mg in 0.9% NaCl 50 mL IVPB, 25 mg, Intravenous, Q6H PRN    sodium chloride 0.9%, 10 mL, Intravenous, PRN    traMADoL, 50 mg, Oral, Q6H PRN     Review of patient's allergies indicates:  No Known Allergies  Objective:     Vital Signs (Most Recent):  Temp: 97.5 °F (36.4 °C) (08/09/24 0717)  Pulse: 78 (08/09/24 0801)  Resp: 18 (08/09/24 0757)  BP: 116/72 (08/09/24 0801)  SpO2: 97 % (08/09/24 0717) Vital Signs (24h Range):  Temp:  [97.5 °F (36.4 °C)-98.6 °F (37 °C)] 97.5 °F (36.4 °C)  Pulse:  [] 78  Resp:  [16-18] 18  SpO2:  [94 %-97 %] 97 %  BP: ()/(51-77) 116/72     Weight: 94.3 kg (207 lb 14.3 oz)  Body mass index is 29.83 kg/m².    Intake/Output - Last 3 Shifts         08/07 0700  08/08 0659 08/08 0700 08/09 0659 08/09 0700  08/10 0659    P.O. 0      I.V. (mL/kg) 288.8 (3.1)      IV Piggyback  197.3      Total Intake(mL/kg) 486.1 (5.2)      Emesis/NG output 50      Total Output 50      Net +436.1             Urine Occurrence 3 x 2 x              Physical Exam  Vitals and nursing note reviewed.   Constitutional:       General: He is not in acute distress.     Appearance: Normal appearance. He is obese. He is not ill-appearing or toxic-appearing.   HENT:      Head: Normocephalic and atraumatic.      Nose: Nose normal.   Eyes:      Extraocular Movements: Extraocular movements intact.      Pupils: Pupils are equal, round, and reactive to light.   Cardiovascular:      Rate and Rhythm: Normal rate and regular rhythm.      Heart sounds: No murmur heard.     No friction rub. No gallop.   Pulmonary:      Effort: Pulmonary effort is normal. No respiratory distress.      Breath sounds: Rhonchi present. No wheezing or rales.      Comments: Sat > 90% on 2L NC  Abdominal:      General: Abdomen is flat. Bowel sounds are normal. There is no distension.      Palpations: Abdomen is soft.      Tenderness: There is no abdominal tenderness. There is no guarding or rebound.   Musculoskeletal:         General: No swelling or deformity.      Cervical back: Normal range of motion and neck supple.      Right lower leg: No edema.      Left lower leg: No edema.   Skin:     General: Skin is warm and dry.      Capillary Refill: Capillary refill takes less than 2 seconds.   Neurological:      General: No focal deficit present.      Mental Status: He is alert and oriented to person, place, and time. Mental status is at baseline.   Psychiatric:         Mood and Affect: Mood normal.         Behavior: Behavior normal.          Significant Labs:  I have reviewed all pertinent lab results within the past 24 hours.  CBC:   Recent Labs   Lab 08/09/24  0519   WBC 9.45   RBC 3.74*   HGB 11.9*   HCT 35.7*   PLT 88*   MCV 96   MCH 31.8*   MCHC 33.3     CMP:   Recent Labs   Lab 08/09/24  0519   GLU 92   CALCIUM 8.6*   ALBUMIN 2.5*   PROT 6.2   NA  140   K 3.8   CO2 30*      BUN 11   CREATININE 1.0   ALKPHOS 42*   ALT 12   AST 10   BILITOT 1.3*       Significant Diagnostics:  I have reviewed all pertinent imaging results/findings within the past 24 hours.  Review of Systems   Constitutional:  Negative for chills and fever.   HENT:  Negative for rhinorrhea, sneezing and sore throat.    Eyes:  Negative for pain and visual disturbance.   Respiratory:  Positive for cough and shortness of breath.    Cardiovascular:  Negative for chest pain.   Gastrointestinal:  Negative for abdominal pain, constipation and diarrhea.   Endocrine: Negative for cold intolerance and heat intolerance.   Genitourinary:  Negative for difficulty urinating.   Musculoskeletal:  Negative for arthralgias and joint swelling.   Skin:  Negative for rash.   Allergic/Immunologic: Negative for environmental allergies.   Neurological:  Negative for dizziness, syncope and weakness.   Hematological:  Does not bruise/bleed easily.   Psychiatric/Behavioral:  Negative for dysphoric mood. The patient is not nervous/anxious.

## 2024-08-09 NOTE — SUBJECTIVE & OBJECTIVE
Interval History:  Patient seen and examined.  DILCIAEON.  HR 70s throughout the night after successful cardioversion.  NPO;  Mild coughing last night without vomiting.  Sat stable on 2L NC.     Medications:  Continuous Infusions:   0/9% NACL & POTASSIUM CHLORIDE 20 MEQ/L   Intravenous Continuous 100 mL/hr at 08/08/24 1315 New Bag at 08/08/24 1315     Scheduled Meds:   adenosine  12 mg Intravenous Once    dexAMETHasone  3 mg Intravenous Daily    EScitalopram oxalate  10 mg Oral Daily    levalbuterol  1.25 mg Nebulization Q6H WAKE    metoprolol  5 mg Intravenous Once    metoprolol tartrate  25 mg Oral BID    pantoprazole  40 mg Intravenous Daily    piperacillin-tazobactam (Zosyn) IV (PEDS and ADULTS) (extended infusion is not appropriate)  4.5 g Intravenous Q8H    SUMAtriptan succinate  6 mg Subcutaneous Once     PRN Meds:  Current Facility-Administered Medications:     acetaminophen, 650 mg, Oral, Q8H PRN    guaiFENesin-codeine 100-10 mg/5 ml, 5 mL, Oral, Q4H PRN    LIDOcaine (PF) 10 mg/ml (1%), 1 mL, Intradermal, Once PRN    morphine, 1 mg, Intravenous, Q6H PRN    ondansetron, 4 mg, Intravenous, Q12H PRN    prochlorperazine, 5 mg, Intravenous, Q6H PRN    promethazine (PHENERGAN) 25 mg in 0.9% NaCl 50 mL IVPB, 25 mg, Intravenous, Q6H PRN    sodium chloride 0.9%, 10 mL, Intravenous, PRN    traMADoL, 50 mg, Oral, Q6H PRN     Review of patient's allergies indicates:  No Known Allergies  Objective:     Vital Signs (Most Recent):  Temp: 97.5 °F (36.4 °C) (08/09/24 0717)  Pulse: 78 (08/09/24 0801)  Resp: 18 (08/09/24 0757)  BP: 116/72 (08/09/24 0801)  SpO2: 97 % (08/09/24 0717) Vital Signs (24h Range):  Temp:  [97.5 °F (36.4 °C)-98.6 °F (37 °C)] 97.5 °F (36.4 °C)  Pulse:  [] 78  Resp:  [16-18] 18  SpO2:  [94 %-97 %] 97 %  BP: ()/(51-77) 116/72     Weight: 94.3 kg (207 lb 14.3 oz)  Body mass index is 29.83 kg/m².    Intake/Output - Last 3 Shifts         08/07 0700  08/08 0659 08/08 0700  08/09 0659 08/09  0700  08/10 0659    P.O. 0      I.V. (mL/kg) 288.8 (3.1)      IV Piggyback 197.3      Total Intake(mL/kg) 486.1 (5.2)      Emesis/NG output 50      Total Output 50      Net +436.1             Urine Occurrence 3 x 2 x              Physical Exam  Vitals reviewed.   Constitutional:       General: He is not in acute distress.     Appearance: He is not ill-appearing or toxic-appearing.   Cardiovascular:      Rate and Rhythm: Normal rate and regular rhythm.   Pulmonary:      Effort: Pulmonary effort is normal. No respiratory distress.   Abdominal:      General: There is no distension.      Palpations: Abdomen is soft.      Tenderness: There is no abdominal tenderness. There is no guarding or rebound.   Musculoskeletal:      Right lower leg: No edema.      Left lower leg: No edema.   Skin:     General: Skin is warm and dry.      Capillary Refill: Capillary refill takes less than 2 seconds.   Neurological:      Mental Status: He is alert. Mental status is at baseline.          Significant Labs:  I have reviewed all pertinent lab results within the past 24 hours.  CBC:   Recent Labs   Lab 08/09/24  0519   WBC 9.45   RBC 3.74*   HGB 11.9*   HCT 35.7*   PLT 88*   MCV 96   MCH 31.8*   MCHC 33.3     CMP:   Recent Labs   Lab 08/09/24  0519   GLU 92   CALCIUM 8.6*   ALBUMIN 2.5*   PROT 6.2      K 3.8   CO2 30*      BUN 11   CREATININE 1.0   ALKPHOS 42*   ALT 12   AST 10   BILITOT 1.3*       Significant Diagnostics:  I have reviewed all pertinent imaging results/findings within the past 24 hours.

## 2024-08-09 NOTE — PLAN OF CARE
Plan of care reviewed with patient. Peripheral IV in place and infusing abx. Pt tolerated meds well. PRN IV phenergan given per MD orders w/ full relief obtained. Pt free from headache, chest pain, and vomiting. Pt afebrile. VSS. NADN. HR <100 throughout shift. Awaiting available bed for pt transfer. Pt free from falls and injury. Questions and concerns addressed. Pt belongings and call bell within reach. Pt denies any needs at this time.   Problem: Adult Inpatient Plan of Care  Goal: Plan of Care Review  Outcome: Progressing  Goal: Patient-Specific Goal (Individualized)  Outcome: Progressing  Goal: Absence of Hospital-Acquired Illness or Injury  Outcome: Progressing  Goal: Optimal Comfort and Wellbeing  Outcome: Progressing  Goal: Readiness for Transition of Care  Outcome: Progressing     Problem: Pain Acute  Goal: Optimal Pain Control and Function  Outcome: Progressing     Problem: Fall Injury Risk  Goal: Absence of Fall and Fall-Related Injury  Outcome: Progressing     Problem: Swallowing Impairment  Goal: Optimal Eating/Swallowing without Aspiration  Outcome: Progressing

## 2024-08-09 NOTE — PROGRESS NOTES
[facility]  Adult Nutrition  Progress Note    SUMMARY       Recommendations  1. Rec'd SLP consult.   2. Rec'd Regular diet with consistency rec's as per SLP.   3. Rec'd ONS: Ensure Enlive TID to provide 1050kcal and 60g of protein.   4. RD to follow and make rec's accordingly.  Goals:   1. Pt's diet will be advanced from NPO by next RD follow up.  2. Pt's diet will be advanced from Clear Liquids by next RD follow up.  Nutrition Goal Status: goal met; new  Communication of RD Recs: reviewed with RN    Assessment and Plan    Nutrition Problem  Inadequate protein energy intake      Related to (etiology):   Increased energy needs (Protein and Calories)      Signs and Symptoms (as evidenced by):   0% PO intake , cancer dx      Interventions/Recommendations (treatment strategy):  1. Rec'd SLP consult.   2. Rec'd Regular diet with consistency rec's as per SLP.   3. Rec'd ONS: Ensure Enlive TID to provide 1050kcal and 60g of protein.  4. Rec'd consider TPN/PPN.   5. RD to follow and make rec's accordingly.     Nutrition Diagnosis Status:   Continues       Malnutrition Assessment  NFPE not warranted at this time. Will continue to monitor for signs and symptoms of malnutrition.    Reason for Assessment    Reason For Assessment: RD follow-up  Diagnosis: other (see comments) (Esophageal stricture)  Relevant Medical History: cerebellar glioma, dysphagia  Interdisciplinary Rounds: did not attend  General Information Comments: Followed up on pt. Pt's diet was advanced to Clear Liquids and pt is tolerating well so far. Spoke with RN dn MD about advancing diet as tolerated and MD stated only liquids for now. Spoke with pt and pt's spouse about nutrition concerns about getting enough protein and calories. DIscussed options to consider PPN/TPN if diet does not advance. Will trial ONS: Ensure Clear with pt tonight for dinner and add to all meals if pt likes it. Will continue to monitor and work with pt on Clear Liquid  "preferences. RD to follow and make rec's accordingly.  Nutrition Discharge Planning: TBD as care  progresses    Nutrition Risk Screen    Nutrition Risk Screen: difficulty chewing/swallowing    Nutrition/Diet History    Patient Reported Diet/Restrictions/Preferences: general  Spiritual, Cultural Beliefs, Denominational Practices, Values that Affect Care: no  Food Allergies: NKFA  Factors Affecting Nutritional Intake: difficulty/impaired swallowing    Anthropometrics    Temp: 98 °F (36.7 °C)  Height: 5' 10" (177.8 cm)  Height (inches): 70 in  Weight Method: Bed Scale  Weight: 94.3 kg (207 lb 14.3 oz)  Weight (lb): 207.9 lb  Ideal Body Weight (IBW), Male: 166 lb  % Ideal Body Weight, Male (lb): 125.24 %  BMI (Calculated): 29.8  BMI Grade: 25 - 29.9 - overweight    Lab/Procedures/Meds    Pertinent Labs Reviewed: reviewed  Pertinent Medications Reviewed: reviewed    Estimated/Assessed Needs    Weight Used For Calorie Calculations: 94.3 kg (207 lb 14.3 oz)  Energy Calorie Requirements (kcal): 2829 (30kcal/kg)  Energy Need Method: Kcal/kg  Protein Requirements: 141 (1.5g/kg)  Weight Used For Protein Calculations: 94.3 kg (207 lb 14.3 oz) (141)  Fluid Requirements (mL): 2829 (1mL/kcal)  Estimated Fluid Requirement Method: RDA Method  RDA Method (mL): 2829    Nutrition Prescription Ordered    Current Diet Order: Clear Liquid  Oral Nutrition Supplement: None    Evaluation of Received Nutrient/Fluid Intake    % Kcal Needs: 0-25%  % Protein Needs: 0-25%  I/O: -50  Energy Calories Required: not meeting needs  Protein Required: not meeting needs  Tolerance: tolerating  % Intake of Estimated Energy Needs: 0 - 25 %  % Meal Intake: 75 - 100 %    Nutrition Risk    Level of Risk/Frequency of Follow-up: high     Monitor and Evaluation    Food and Nutrient Intake: energy intake, food and beverage intake  Food and Nutrient Adminstration: diet order  Knowledge/Beliefs/Attitudes: food and nutrition knowledge/skill, beliefs and " attitudes  Physical Activity and Function: nutrition-related ADLs and IADLs  Anthropometric Measurements: height/length, weight, weight change, body mass index  Biochemical Data, Medical Tests and Procedures: electrolyte and renal panel, gastrointestinal profile, glucose/endocrine profile, lipid profile, inflammatory profile  Nutrition-Focused Physical Findings: overall appearance     Nutrition Follow-Up    RD Follow-up?: Yes

## 2024-08-09 NOTE — PROGRESS NOTES
[facility]  General Surgery  Progress Note    Subjective:     History of Present Illness:  No notes on file    Post-Op Info:  Procedure(s) (LRB):  EGD (ESOPHAGOGASTRODUODENOSCOPY) (N/A)   2 Days Post-Op     Interval History:  Patient seen and examined.  NAEON.  HR 70s throughout the night after successful cardioversion.  NPO;  Mild coughing last night without vomiting.  Sat stable on 2L NC.     Medications:  Continuous Infusions:   0/9% NACL & POTASSIUM CHLORIDE 20 MEQ/L   Intravenous Continuous 100 mL/hr at 08/08/24 1315 New Bag at 08/08/24 1315     Scheduled Meds:   adenosine  12 mg Intravenous Once    dexAMETHasone  3 mg Intravenous Daily    EScitalopram oxalate  10 mg Oral Daily    levalbuterol  1.25 mg Nebulization Q6H WAKE    metoprolol  5 mg Intravenous Once    metoprolol tartrate  25 mg Oral BID    pantoprazole  40 mg Intravenous Daily    piperacillin-tazobactam (Zosyn) IV (PEDS and ADULTS) (extended infusion is not appropriate)  4.5 g Intravenous Q8H    SUMAtriptan succinate  6 mg Subcutaneous Once     PRN Meds:  Current Facility-Administered Medications:     acetaminophen, 650 mg, Oral, Q8H PRN    guaiFENesin-codeine 100-10 mg/5 ml, 5 mL, Oral, Q4H PRN    LIDOcaine (PF) 10 mg/ml (1%), 1 mL, Intradermal, Once PRN    morphine, 1 mg, Intravenous, Q6H PRN    ondansetron, 4 mg, Intravenous, Q12H PRN    prochlorperazine, 5 mg, Intravenous, Q6H PRN    promethazine (PHENERGAN) 25 mg in 0.9% NaCl 50 mL IVPB, 25 mg, Intravenous, Q6H PRN    sodium chloride 0.9%, 10 mL, Intravenous, PRN    traMADoL, 50 mg, Oral, Q6H PRN     Review of patient's allergies indicates:  No Known Allergies  Objective:     Vital Signs (Most Recent):  Temp: 97.5 °F (36.4 °C) (08/09/24 0717)  Pulse: 78 (08/09/24 0801)  Resp: 18 (08/09/24 0757)  BP: 116/72 (08/09/24 0801)  SpO2: 97 % (08/09/24 0717) Vital Signs (24h Range):  Temp:  [97.5 °F (36.4 °C)-98.6 °F (37 °C)] 97.5 °F (36.4 °C)  Pulse:  [] 78  Resp:  [16-18] 18  SpO2:  [94  %-97 %] 97 %  BP: ()/(51-77) 116/72     Weight: 94.3 kg (207 lb 14.3 oz)  Body mass index is 29.83 kg/m².    Intake/Output - Last 3 Shifts         08/07 0700  08/08 0659 08/08 0700  08/09 0659 08/09 0700  08/10 0659    P.O. 0      I.V. (mL/kg) 288.8 (3.1)      IV Piggyback 197.3      Total Intake(mL/kg) 486.1 (5.2)      Emesis/NG output 50      Total Output 50      Net +436.1             Urine Occurrence 3 x 2 x              Physical Exam  Vitals reviewed.   Constitutional:       General: He is not in acute distress.     Appearance: He is not ill-appearing or toxic-appearing.   Cardiovascular:      Rate and Rhythm: Normal rate and regular rhythm.   Pulmonary:      Effort: Pulmonary effort is normal. No respiratory distress.   Abdominal:      General: There is no distension.      Palpations: Abdomen is soft.      Tenderness: There is no abdominal tenderness. There is no guarding or rebound.   Musculoskeletal:      Right lower leg: No edema.      Left lower leg: No edema.   Skin:     General: Skin is warm and dry.      Capillary Refill: Capillary refill takes less than 2 seconds.   Neurological:      Mental Status: He is alert. Mental status is at baseline.          Significant Labs:  I have reviewed all pertinent lab results within the past 24 hours.  CBC:   Recent Labs   Lab 08/09/24  0519   WBC 9.45   RBC 3.74*   HGB 11.9*   HCT 35.7*   PLT 88*   MCV 96   MCH 31.8*   MCHC 33.3     CMP:   Recent Labs   Lab 08/09/24  0519   GLU 92   CALCIUM 8.6*   ALBUMIN 2.5*   PROT 6.2      K 3.8   CO2 30*      BUN 11   CREATININE 1.0   ALKPHOS 42*   ALT 12   AST 10   BILITOT 1.3*       Significant Diagnostics:  I have reviewed all pertinent imaging results/findings within the past 24 hours.  Assessment/Plan:     * Aspiration pneumonia  Resp stable on 2L NC   Continue IV Zosyn     Esophageal stricture  -Accepted to [facility] for IM and GI evaluation - awaiting bed assignment   -recommend transfer for  gastroenterology evaluation  -Keep NPO with sparing ice chips for comfort  -Head of bed 30 degrees     SVT (supraventricular tachycardia)  Cardiology following  S/p Adenosine cardioversion this afternoon   HD stable         Gail Chandler MD  General Surgery  [facility]

## 2024-08-09 NOTE — ASSESSMENT & PLAN NOTE
-Accepted to main campus for IM and GI evaluation - awaiting bed assignment   -recommend transfer for gastroenterology evaluation  -Keep NPO with sparing ice chips for comfort  -Head of bed 30 degrees

## 2024-08-09 NOTE — PROGRESS NOTES
[facility] Medicine  Progress Note    Patient Name: Sukumar Wiseman  MRN: [id number]  Patient Class: IP- Inpatient   Admission Date: 8/7/2024  Length of Stay: 1 days  Attending Physician: Gail Chandler MD  Primary Care Provider: Larry Nicholas Jr., MD        Subjective:     Principal Problem:Aspiration pneumonia        HPI:  Patient presented for EGD for esophageal stricture and dilation. Patient found with food present in esophagus, began vomiting during procedure. Procedure stopped due to risk of aspiration. Patient found to have low oxygen saturation post op. Patient admitted and started on broad spectrum IV antibiotics, requiring supplemental oxygen.     Overview/Hospital Course:  08/08: patient seen and examined this morning. He is resting comfortably on supplemental oxygen. Episode of SVT during the night. Cardiology ultimately consulted for further recommendations. Continue IV antibiotics.    8/9/24:  patient sitting at the side of bed coughing and spitting into trashcan.  Family discussion held regarding plan of care.  Transfer to higher level of care recommended for ongoing pna treatment/platelet transfusion if needed/PT/OT/monitoring of heart rhythm.    Interval History:  Patient was seen at labs into SVT.  S/D this afternoon. Remains NPO. Poorly tolerant of secretions.    Medications:  Continuous Infusions:   0/9% NACL & POTASSIUM CHLORIDE 20 MEQ/L   Intravenous Continuous 100 mL/hr at 08/08/24 1315 New Bag at 08/08/24 1315     Scheduled Meds:   adenosine  12 mg Intravenous Once    dexAMETHasone  3 mg Intravenous Daily    EScitalopram oxalate  10 mg Oral Daily    levalbuterol  1.25 mg Nebulization Q6H WAKE    metoprolol  5 mg Intravenous Once    metoprolol tartrate  25 mg Oral BID    pantoprazole  40 mg Intravenous Daily    piperacillin-tazobactam (Zosyn) IV (PEDS and ADULTS) (extended infusion is not appropriate)  4.5 g Intravenous Q8H    SUMAtriptan succinate  6 mg Subcutaneous  Once     PRN Meds:  Current Facility-Administered Medications:     acetaminophen, 650 mg, Oral, Q8H PRN    guaiFENesin-codeine 100-10 mg/5 ml, 5 mL, Oral, Q4H PRN    LIDOcaine (PF) 10 mg/ml (1%), 1 mL, Intradermal, Once PRN    morphine, 1 mg, Intravenous, Q6H PRN    ondansetron, 4 mg, Intravenous, Q12H PRN    prochlorperazine, 5 mg, Intravenous, Q6H PRN    promethazine (PHENERGAN) 25 mg in 0.9% NaCl 50 mL IVPB, 25 mg, Intravenous, Q6H PRN    sodium chloride 0.9%, 10 mL, Intravenous, PRN    traMADoL, 50 mg, Oral, Q6H PRN     Review of patient's allergies indicates:  No Known Allergies  Objective:     Vital Signs (Most Recent):  Temp: 97.5 °F (36.4 °C) (08/09/24 0717)  Pulse: 78 (08/09/24 0801)  Resp: 18 (08/09/24 0757)  BP: 116/72 (08/09/24 0801)  SpO2: 97 % (08/09/24 0717) Vital Signs (24h Range):  Temp:  [97.5 °F (36.4 °C)-98.6 °F (37 °C)] 97.5 °F (36.4 °C)  Pulse:  [] 78  Resp:  [16-18] 18  SpO2:  [94 %-97 %] 97 %  BP: ()/(51-77) 116/72     Weight: 94.3 kg (207 lb 14.3 oz)  Body mass index is 29.83 kg/m².    Intake/Output - Last 3 Shifts         08/07 0700  08/08 0659 08/08 0700 08/09 0659 08/09 0700  08/10 0659    P.O. 0      I.V. (mL/kg) 288.8 (3.1)      IV Piggyback 197.3      Total Intake(mL/kg) 486.1 (5.2)      Emesis/NG output 50      Total Output 50      Net +436.1             Urine Occurrence 3 x 2 x              Physical Exam  Vitals and nursing note reviewed.   Constitutional:       General: He is not in acute distress.     Appearance: Normal appearance. He is obese. He is not ill-appearing or toxic-appearing.   HENT:      Head: Normocephalic and atraumatic.      Nose: Nose normal.   Eyes:      Extraocular Movements: Extraocular movements intact.      Pupils: Pupils are equal, round, and reactive to light.   Cardiovascular:      Rate and Rhythm: Normal rate and regular rhythm.      Heart sounds: No murmur heard.     No friction rub. No gallop.   Pulmonary:      Effort: Pulmonary effort is  normal. No respiratory distress.      Breath sounds: Rhonchi present. No wheezing or rales.      Comments: Sat > 90% on 2L NC  Abdominal:      General: Abdomen is flat. Bowel sounds are normal. There is no distension.      Palpations: Abdomen is soft.      Tenderness: There is no abdominal tenderness. There is no guarding or rebound.   Musculoskeletal:         General: No swelling or deformity.      Cervical back: Normal range of motion and neck supple.      Right lower leg: No edema.      Left lower leg: No edema.   Skin:     General: Skin is warm and dry.      Capillary Refill: Capillary refill takes less than 2 seconds.   Neurological:      General: No focal deficit present.      Mental Status: He is alert and oriented to person, place, and time. Mental status is at baseline.   Psychiatric:         Mood and Affect: Mood normal.         Behavior: Behavior normal.          Significant Labs:  I have reviewed all pertinent lab results within the past 24 hours.  CBC:   Recent Labs   Lab 08/09/24  0519   WBC 9.45   RBC 3.74*   HGB 11.9*   HCT 35.7*   PLT 88*   MCV 96   MCH 31.8*   MCHC 33.3     CMP:   Recent Labs   Lab 08/09/24  0519   GLU 92   CALCIUM 8.6*   ALBUMIN 2.5*   PROT 6.2      K 3.8   CO2 30*      BUN 11   CREATININE 1.0   ALKPHOS 42*   ALT 12   AST 10   BILITOT 1.3*       Significant Diagnostics:  I have reviewed all pertinent imaging results/findings within the past 24 hours.  Review of Systems   Constitutional:  Negative for chills and fever.   HENT:  Negative for rhinorrhea, sneezing and sore throat.    Eyes:  Negative for pain and visual disturbance.   Respiratory:  Positive for cough and shortness of breath.    Cardiovascular:  Negative for chest pain.   Gastrointestinal:  Negative for abdominal pain, constipation and diarrhea.   Endocrine: Negative for cold intolerance and heat intolerance.   Genitourinary:  Negative for difficulty urinating.   Musculoskeletal:  Negative for arthralgias  and joint swelling.   Skin:  Negative for rash.   Allergic/Immunologic: Negative for environmental allergies.   Neurological:  Negative for dizziness, syncope and weakness.   Hematological:  Does not bruise/bleed easily.   Psychiatric/Behavioral:  Negative for dysphoric mood. The patient is not nervous/anxious.        Assessment/Plan:      * Aspiration pneumonia  CXR reviewed. Continue IV zosyn.     Antibiotics (From admission, onward)      Start     Stop Route Frequency Ordered    08/07/24 1500  piperacillin-tazobactam (ZOSYN) 4.5 g in D5W 100 mL IVPB (MB+)         -- IV Every 8 hours (non-standard times) 08/07/24 1434            Microbiology Results (last 7 days)       ** No results found for the last 168 hours. **            Esophageal stricture  Continue management per GS.       SVT (supraventricular tachycardia)  Continue management per cardiology recs.        Gastroesophageal reflux disease without esophagitis  Continue IV PPI.     Cerebellar mass  Will start home dose decadron.       KIM (generalized anxiety disorder)  On home dose lexapro. Will follow.       VTE Risk Mitigation (From admission, onward)           Ordered     IP VTE HIGH RISK PATIENT  Once         08/07/24 1220     Place sequential compression device  Until discontinued         08/07/24 1220                    Discharge Planning   BULMARO:      Code Status: Full Code   Is the patient medically ready for discharge?:     Reason for patient still in hospital (select all that apply): Treatment  Discharge Plan A: Home with family                  Larry Nicholas Jr, MD  Department of Hospital Medicine   [facility]

## 2024-08-10 ENCOUNTER — HOSPITAL ENCOUNTER (INPATIENT)
Facility: HOSPITAL | Age: 39
LOS: 3 days | Discharge: HOME | DRG: 391 | End: 2024-08-13
Attending: HOSPITALIST | Admitting: HOSPITALIST
Payer: COMMERCIAL

## 2024-08-10 DIAGNOSIS — K22.2 ESOPHAGEAL STRICTURE: ICD-10-CM

## 2024-08-10 DIAGNOSIS — R13.19 ESOPHAGEAL DYSPHAGIA: Primary | ICD-10-CM

## 2024-08-10 DIAGNOSIS — R07.9 CHEST PAIN: ICD-10-CM

## 2024-08-10 DIAGNOSIS — K21.9 GERD (GASTROESOPHAGEAL REFLUX DISEASE): ICD-10-CM

## 2024-08-10 LAB
ALBUMIN SERPL BCP-MCNC: 2.9 G/DL (ref 3.5–5.2)
ALP SERPL-CCNC: 45 U/L (ref 55–135)
ALT SERPL W/O P-5'-P-CCNC: 17 U/L (ref 10–44)
ANION GAP SERPL CALC-SCNC: 8 MMOL/L (ref 8–16)
ANISOCYTOSIS BLD QL SMEAR: SLIGHT
AST SERPL-CCNC: 9 U/L (ref 10–40)
BASOPHILS # BLD AUTO: 0.01 K/UL (ref 0–0.2)
BASOPHILS NFR BLD: 0.1 % (ref 0–1.9)
BILIRUB SERPL-MCNC: 1.1 MG/DL (ref 0.1–1)
BUN SERPL-MCNC: 8 MG/DL (ref 6–20)
CALCIUM SERPL-MCNC: 9.2 MG/DL (ref 8.7–10.5)
CHLORIDE SERPL-SCNC: 107 MMOL/L (ref 95–110)
CO2 SERPL-SCNC: 23 MMOL/L (ref 23–29)
CREAT SERPL-MCNC: 0.8 MG/DL (ref 0.5–1.4)
DIFFERENTIAL METHOD BLD: ABNORMAL
EOSINOPHIL # BLD AUTO: 0 K/UL (ref 0–0.5)
EOSINOPHIL NFR BLD: 0.3 % (ref 0–8)
ERYTHROCYTE [DISTWIDTH] IN BLOOD BY AUTOMATED COUNT: 14 % (ref 11.5–14.5)
EST. GFR  (NO RACE VARIABLE): >60 ML/MIN/1.73 M^2
GLUCOSE SERPL-MCNC: 99 MG/DL (ref 70–110)
HCT VFR BLD AUTO: 35.7 % (ref 40–54)
HGB BLD-MCNC: 11.7 G/DL (ref 14–18)
IMM GRANULOCYTES # BLD AUTO: 0.04 K/UL (ref 0–0.04)
IMM GRANULOCYTES NFR BLD AUTO: 0.5 % (ref 0–0.5)
LYMPHOCYTES # BLD AUTO: 0.6 K/UL (ref 1–4.8)
LYMPHOCYTES NFR BLD: 6.7 % (ref 18–48)
MAGNESIUM SERPL-MCNC: 1.8 MG/DL (ref 1.6–2.6)
MCH RBC QN AUTO: 31.5 PG (ref 27–31)
MCHC RBC AUTO-ENTMCNC: 32.8 G/DL (ref 32–36)
MCV RBC AUTO: 96 FL (ref 82–98)
MONOCYTES # BLD AUTO: 0.4 K/UL (ref 0.3–1)
MONOCYTES NFR BLD: 5 % (ref 4–15)
NEUTROPHILS # BLD AUTO: 7.7 K/UL (ref 1.8–7.7)
NEUTROPHILS NFR BLD: 87.4 % (ref 38–73)
NRBC BLD-RTO: 0 /100 WBC
PLATELET # BLD AUTO: 104 K/UL (ref 150–450)
PLATELET BLD QL SMEAR: ABNORMAL
PMV BLD AUTO: 8.2 FL (ref 9.2–12.9)
POTASSIUM SERPL-SCNC: 3.5 MMOL/L (ref 3.5–5.1)
PROT SERPL-MCNC: 6.4 G/DL (ref 6–8.4)
RBC # BLD AUTO: 3.71 M/UL (ref 4.6–6.2)
SODIUM SERPL-SCNC: 138 MMOL/L (ref 136–145)
WBC # BLD AUTO: 8.77 K/UL (ref 3.9–12.7)

## 2024-08-10 PROCEDURE — 63600175 PHARM REV CODE 636 W HCPCS: Performed by: STUDENT IN AN ORGANIZED HEALTH CARE EDUCATION/TRAINING PROGRAM

## 2024-08-10 PROCEDURE — 85025 COMPLETE CBC W/AUTO DIFF WBC: CPT | Performed by: INTERNAL MEDICINE

## 2024-08-10 PROCEDURE — 94761 N-INVAS EAR/PLS OXIMETRY MLT: CPT

## 2024-08-10 PROCEDURE — 80053 COMPREHEN METABOLIC PANEL: CPT | Performed by: INTERNAL MEDICINE

## 2024-08-10 PROCEDURE — 25000003 PHARM REV CODE 250: Performed by: STUDENT IN AN ORGANIZED HEALTH CARE EDUCATION/TRAINING PROGRAM

## 2024-08-10 PROCEDURE — 99223 1ST HOSP IP/OBS HIGH 75: CPT | Mod: ,,, | Performed by: INTERNAL MEDICINE

## 2024-08-10 PROCEDURE — 36415 COLL VENOUS BLD VENIPUNCTURE: CPT | Performed by: INTERNAL MEDICINE

## 2024-08-10 PROCEDURE — 94640 AIRWAY INHALATION TREATMENT: CPT

## 2024-08-10 PROCEDURE — 20600001 HC STEP DOWN PRIVATE ROOM

## 2024-08-10 PROCEDURE — 99900035 HC TECH TIME PER 15 MIN (STAT)

## 2024-08-10 PROCEDURE — 25000242 PHARM REV CODE 250 ALT 637 W/ HCPCS: Performed by: STUDENT IN AN ORGANIZED HEALTH CARE EDUCATION/TRAINING PROGRAM

## 2024-08-10 PROCEDURE — 83735 ASSAY OF MAGNESIUM: CPT | Performed by: INTERNAL MEDICINE

## 2024-08-10 RX ORDER — SODIUM CHLORIDE 0.9 % (FLUSH) 0.9 %
10 SYRINGE (ML) INJECTION EVERY 8 HOURS PRN
Status: DISCONTINUED | OUTPATIENT
Start: 2024-08-10 | End: 2024-08-13 | Stop reason: HOSPADM

## 2024-08-10 RX ORDER — ONDANSETRON HYDROCHLORIDE 2 MG/ML
4 INJECTION, SOLUTION INTRAVENOUS EVERY 12 HOURS PRN
Status: DISCONTINUED | OUTPATIENT
Start: 2024-08-10 | End: 2024-08-13 | Stop reason: HOSPADM

## 2024-08-10 RX ORDER — TALC
6 POWDER (GRAM) TOPICAL NIGHTLY PRN
Status: DISCONTINUED | OUTPATIENT
Start: 2024-08-10 | End: 2024-08-13 | Stop reason: HOSPADM

## 2024-08-10 RX ORDER — PROCHLORPERAZINE EDISYLATE 5 MG/ML
5 INJECTION INTRAMUSCULAR; INTRAVENOUS EVERY 6 HOURS PRN
Status: DISCONTINUED | OUTPATIENT
Start: 2024-08-10 | End: 2024-08-13 | Stop reason: HOSPADM

## 2024-08-10 RX ORDER — CODEINE PHOSPHATE AND GUAIFENESIN 10; 100 MG/5ML; MG/5ML
5 SOLUTION ORAL EVERY 4 HOURS PRN
Status: DISCONTINUED | OUTPATIENT
Start: 2024-08-10 | End: 2024-08-10

## 2024-08-10 RX ORDER — ACETAMINOPHEN 325 MG/1
650 TABLET ORAL EVERY 8 HOURS PRN
Status: DISCONTINUED | OUTPATIENT
Start: 2024-08-10 | End: 2024-08-13 | Stop reason: HOSPADM

## 2024-08-10 RX ORDER — ONDANSETRON 8 MG/1
8 TABLET, ORALLY DISINTEGRATING ORAL EVERY 8 HOURS PRN
Status: DISCONTINUED | OUTPATIENT
Start: 2024-08-10 | End: 2024-08-13 | Stop reason: HOSPADM

## 2024-08-10 RX ORDER — IBUPROFEN 200 MG
24 TABLET ORAL
Status: DISCONTINUED | OUTPATIENT
Start: 2024-08-10 | End: 2024-08-13 | Stop reason: HOSPADM

## 2024-08-10 RX ORDER — METOPROLOL TARTRATE 25 MG/1
25 TABLET, FILM COATED ORAL 2 TIMES DAILY
Status: DISCONTINUED | OUTPATIENT
Start: 2024-08-10 | End: 2024-08-13 | Stop reason: HOSPADM

## 2024-08-10 RX ORDER — DEXTROSE MONOHYDRATE, SODIUM CHLORIDE, AND POTASSIUM CHLORIDE 50; 1.49; 9 G/1000ML; G/1000ML; G/1000ML
INJECTION, SOLUTION INTRAVENOUS CONTINUOUS
Status: DISCONTINUED | OUTPATIENT
Start: 2024-08-10 | End: 2024-08-10

## 2024-08-10 RX ORDER — MORPHINE SULFATE 2 MG/ML
1 INJECTION, SOLUTION INTRAMUSCULAR; INTRAVENOUS EVERY 6 HOURS PRN
Status: DISCONTINUED | OUTPATIENT
Start: 2024-08-10 | End: 2024-08-13 | Stop reason: HOSPADM

## 2024-08-10 RX ORDER — POLYETHYLENE GLYCOL 3350 17 G/17G
17 POWDER, FOR SOLUTION ORAL DAILY
Status: DISCONTINUED | OUTPATIENT
Start: 2024-08-10 | End: 2024-08-13 | Stop reason: HOSPADM

## 2024-08-10 RX ORDER — TRAMADOL HYDROCHLORIDE 50 MG/1
50 TABLET ORAL EVERY 6 HOURS PRN
Status: DISCONTINUED | OUTPATIENT
Start: 2024-08-10 | End: 2024-08-13 | Stop reason: HOSPADM

## 2024-08-10 RX ORDER — ACETAMINOPHEN 325 MG/1
650 TABLET ORAL EVERY 4 HOURS PRN
Status: DISCONTINUED | OUTPATIENT
Start: 2024-08-10 | End: 2024-08-13 | Stop reason: HOSPADM

## 2024-08-10 RX ORDER — ENOXAPARIN SODIUM 100 MG/ML
40 INJECTION SUBCUTANEOUS EVERY 24 HOURS
Status: DISCONTINUED | OUTPATIENT
Start: 2024-08-10 | End: 2024-08-10

## 2024-08-10 RX ORDER — NALOXONE HCL 0.4 MG/ML
0.02 VIAL (ML) INJECTION
Status: DISCONTINUED | OUTPATIENT
Start: 2024-08-10 | End: 2024-08-13 | Stop reason: HOSPADM

## 2024-08-10 RX ORDER — GLUCAGON 1 MG
1 KIT INJECTION
Status: DISCONTINUED | OUTPATIENT
Start: 2024-08-10 | End: 2024-08-13 | Stop reason: HOSPADM

## 2024-08-10 RX ORDER — ACETAMINOPHEN 325 MG/1
650 TABLET ORAL EVERY 6 HOURS PRN
Status: DISCONTINUED | OUTPATIENT
Start: 2024-08-10 | End: 2024-08-13 | Stop reason: HOSPADM

## 2024-08-10 RX ORDER — ENOXAPARIN SODIUM 100 MG/ML
40 INJECTION SUBCUTANEOUS EVERY 24 HOURS
Status: COMPLETED | OUTPATIENT
Start: 2024-08-10 | End: 2024-08-11

## 2024-08-10 RX ORDER — ESCITALOPRAM OXALATE 10 MG/1
10 TABLET ORAL DAILY
Status: DISCONTINUED | OUTPATIENT
Start: 2024-08-10 | End: 2024-08-13 | Stop reason: HOSPADM

## 2024-08-10 RX ORDER — LEVALBUTEROL 1.25 MG/.5ML
1.25 SOLUTION, CONCENTRATE RESPIRATORY (INHALATION)
Status: DISCONTINUED | OUTPATIENT
Start: 2024-08-10 | End: 2024-08-13 | Stop reason: HOSPADM

## 2024-08-10 RX ORDER — IPRATROPIUM BROMIDE AND ALBUTEROL SULFATE 2.5; .5 MG/3ML; MG/3ML
3 SOLUTION RESPIRATORY (INHALATION) EVERY 6 HOURS PRN
Status: DISCONTINUED | OUTPATIENT
Start: 2024-08-10 | End: 2024-08-13 | Stop reason: HOSPADM

## 2024-08-10 RX ORDER — PANTOPRAZOLE SODIUM 40 MG/10ML
40 INJECTION, POWDER, LYOPHILIZED, FOR SOLUTION INTRAVENOUS DAILY
Status: DISCONTINUED | OUTPATIENT
Start: 2024-08-10 | End: 2024-08-13

## 2024-08-10 RX ORDER — GUAIFENESIN 100 MG/5ML
200 SOLUTION ORAL EVERY 4 HOURS PRN
Status: DISCONTINUED | OUTPATIENT
Start: 2024-08-10 | End: 2024-08-13 | Stop reason: HOSPADM

## 2024-08-10 RX ORDER — DEXAMETHASONE SODIUM PHOSPHATE 4 MG/ML
3 INJECTION, SOLUTION INTRA-ARTICULAR; INTRALESIONAL; INTRAMUSCULAR; INTRAVENOUS; SOFT TISSUE EVERY 24 HOURS
Status: DISCONTINUED | OUTPATIENT
Start: 2024-08-10 | End: 2024-08-13

## 2024-08-10 RX ORDER — IBUPROFEN 200 MG
16 TABLET ORAL
Status: DISCONTINUED | OUTPATIENT
Start: 2024-08-10 | End: 2024-08-13 | Stop reason: HOSPADM

## 2024-08-10 RX ADMIN — DEXTROSE MONOHYDRATE, SODIUM CHLORIDE, AND POTASSIUM CHLORIDE: 50; 9; 1.49 INJECTION, SOLUTION INTRAVENOUS at 10:08

## 2024-08-10 RX ADMIN — METOPROLOL TARTRATE 25 MG: 25 TABLET, FILM COATED ORAL at 09:08

## 2024-08-10 RX ADMIN — DEXAMETHASONE SODIUM PHOSPHATE 3 MG: 4 INJECTION INTRA-ARTICULAR; INTRALESIONAL; INTRAMUSCULAR; INTRAVENOUS; SOFT TISSUE at 09:08

## 2024-08-10 RX ADMIN — PIPERACILLIN SODIUM AND TAZOBACTAM SODIUM 4.5 G: 4; .5 INJECTION, POWDER, FOR SOLUTION INTRAVENOUS at 10:08

## 2024-08-10 RX ADMIN — PROMETHAZINE HYDROCHLORIDE 25 MG: 25 INJECTION INTRAMUSCULAR; INTRAVENOUS at 09:08

## 2024-08-10 RX ADMIN — ESCITALOPRAM OXALATE 10 MG: 10 TABLET ORAL at 09:08

## 2024-08-10 RX ADMIN — TRAMADOL HYDROCHLORIDE 50 MG: 50 TABLET, COATED ORAL at 12:08

## 2024-08-10 RX ADMIN — LEVALBUTEROL 1.25 MG: 1.25 SOLUTION, CONCENTRATE RESPIRATORY (INHALATION) at 02:08

## 2024-08-10 RX ADMIN — PANTOPRAZOLE SODIUM 40 MG: 40 INJECTION, POWDER, FOR SOLUTION INTRAVENOUS at 09:08

## 2024-08-10 RX ADMIN — LEVALBUTEROL 1.25 MG: 1.25 SOLUTION, CONCENTRATE RESPIRATORY (INHALATION) at 08:08

## 2024-08-10 RX ADMIN — LEVALBUTEROL 1.25 MG: 1.25 SOLUTION, CONCENTRATE RESPIRATORY (INHALATION) at 09:08

## 2024-08-10 RX ADMIN — PIPERACILLIN SODIUM AND TAZOBACTAM SODIUM 4.5 G: 4; .5 INJECTION, POWDER, FOR SOLUTION INTRAVENOUS at 05:08

## 2024-08-10 RX ADMIN — ENOXAPARIN SODIUM 40 MG: 40 INJECTION SUBCUTANEOUS at 05:08

## 2024-08-10 NOTE — ASSESSMENT & PLAN NOTE
-chronic, had been uncontrolled during last hospitalization and not responsive to multiple IV Lopressor, IVF.  Ultimately requiring adenosine with improvement  -currently stable  -continue Lopressor  -monitor on tele

## 2024-08-10 NOTE — NURSING
Admit arrival to room 1060 around 330am; patient stable aaox4, wife at bedside. Iv saline lock to left hand intact wnl. Patient placed on  Tele monitor #[id number]/tele monitor tech notified  To add box to monitor patient/new admit. Vitals stable, admit history admit assessment done  See flow chart. Aspiration precautions in place.  Safety measures in place. Call bell in reach  Bed low, locked side rails up x2, fall precautions in place. Patient denied any generalized  Discomfort or pain on admit rounds.  Patient and wife both verbalized understanding plan of care  And use of call bell and safety measures and  Denies any further needs on rounds. Admit  Team notified by charge nurse camille baltazar, patient  Has arrived to unit.  4eyes done by myself and camille baltazar charge  Negative. Skin clear, no breakdowns noted.

## 2024-08-10 NOTE — NURSING
Nurses Note -- 4 Eyes      8/10/2024   4:36 AM      Skin assessed during: Admit      [x] No Altered Skin Integrity Present    []Prevention Measures Documented      [] Yes- Altered Skin Integrity Present or Discovered   [] LDA Added if Not in Epic (Describe Wound)   [] New Altered Skin Integrity was Present on Admit and Documented in LDA   [] Wound Image Taken    Wound Care Consulted? No    Attending Nurse:  Jennyfer Faulkner RN/Staff Member:   camille baltazar

## 2024-08-10 NOTE — CONSULTS
[facility]  Gastroenterology  Consult Note    Patient Name: Sukumar Wiseman  MRN: [id number]  Admission Date: 8/10/2024  Hospital Length of Stay: 0 days  Code Status: Full Code   Attending Provider: Ishaan Calix DO   Consulting Provider: Wendy Da Silva MD  Primary Care Physician: Larry Nicholas Jr., MD  Principal Problem:<principal problem not specified>    Inpatient consult to Gastroenterology  Consult performed by: Wendy Da Silva MD  Consult ordered by: Ishaan Calix DO        Subjective:     HPI: Sukumar Wiseman is a 38 y.o. 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 Per endoscopy report there was a large amount of food immediately seen upon intubation of the esophagus so it was quickly aborted. Cardiology was consulted post procedure for tachy in the 170s which he became hypotensive from, and he required adenosine to convert to sinus rhythm. He is on IV abx for pneumonia. GI consulted as patient was transferred from [facility] to [facility] for GI eval of esophageal stricture.   Patient denies pain at the moment but wife says his esophagus is painful whenever he doesn't drink fluids and its dry. She also mentioned that [facility] told them they wouldn't give him cycle 3 of the treatment unless he received treatment/dilation of the esophagus because he's too high of an aspiration/infection risk and the chemo would further that. She did not know if PEG tube was ever discussed.    He did tolerate CLD at the outside hospital without trouble but has been NPO since arrival here.    Review of Systems   Constitutional:  Negative for chills and fever.   Gastrointestinal:  Positive for vomiting. Negative for abdominal pain, blood in stool, constipation, diarrhea, heartburn, melena and  nausea.        Objective:     Vitals:    08/10/24 0857   BP:    Pulse: 72   Resp: 18   Temp:          Constitutional:  not in acute distress and well developed  HENT: Head: Normal, normocephalic, atraumatic.  Eyes: conjunctiva clear and sclera nonicteric  GI: soft, non-tender, without masses or organomegaly  Skin: normal color and no jaundice  Neurological: alert, oriented x3; dysphasia, twitching       Significant Labs:  Reviewed the following pertinent laboratory tests: CBC, CMP. Notable for mild anemia, mild elevation in lfts.    Significant Imaging:  No pertinent imaging.      Assessment/Plan:     Sukumar Wiseman is a 38 y.o. 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 Cardiology was consulted and he required adenosine to convert to sinus rhythm. He is on IV abx for pneumonia. GI consulted as patient was transferred from [facility] to [facility] for GI eval of esophageal stricture.     Problem List:  Esophageal stricture   Dysphagia  Odynophagia  Cerebellar glioma on lomustine (cycle 2 was 7/16)      Recommendations:  - patient requires dilation in order for [facility] to give him cycle 3 of his chemotherapy for cerebellar glioma as they consider him a high risk for infection otherwise   - tolerated clear liquid diet yesterday  - okay to trial CLD today  - NPO Sunday for EGD Monday  - hold AM lovenox Monday     Thank you for involving us in the care of Sukumar Wiseman. Please call with any additional questions, concerns or changes in the patient's clinical status. We will continue to follow.

## 2024-08-10 NOTE — H&P
[facility] Medicine  History & Physical    Patient Name: Sukumar Wiseman  MRN: [id number]  Patient Class: IP- Inpatient  Admission Date: 8/10/2024  Attending Physician: Ishaan Calix DO   Primary Care Provider: Larry Nicholas Jr., MD         Patient information was obtained from patient, spouse/SO, and ER records.     Subjective:     Principal Problem:Esophageal stricture    Chief Complaint: No chief complaint on file.       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    Per sign out: 'Procedure was aborted due to large food burden in proximal esophagus with vomiting, and concern for possible aspiration. Patient required placement on O2 via NC, with BP stable and remained afebrile. CXR showed retrocardiac airspace opacity consistent with pneumonia with mild opacity also persisting in the left perihilar region. Patient started on zosyn. Patient's course is further c/b SVT. Cardiology was consulted and notes that this morning patient remained with sustained HR 170s after 5 mg IV Lopressor, 2.5 mg IV Lopressor, and 500 cc bolus NS. BP became hypotensive and pt's hemodynamics slowly became unstable. Pt was given 6 mg Adenosine IV push with flush. Crash cart at beside with pads attached. Post adenosine, HR improved to SR 90s. Pt stable, tolerated well. No immediate complications. Patient remains on PO metoprolol, with further recommendations from cardiology to follow. [facility] wishes to transfer for further GI eval, with patient NPO and HOB 30 degrees. If concern for airway protection, may need upgrade. Oncology will consult.'    Patient states that he has been dealing with dysphagia for a while.  Most notably 3 months ago he started experiencing retching and vomiting after meals.  About 2 weeks ago, episodes  occurred later at night.  Esophagram was done noting moderate dilation of the distal aspect of the esophagus, may indicate the presence of achalasia/longstanding stricture.  Currently denies any fevers, chills, nausea, vomiting, abdominal pain, chest pain, shortness of breath.  Denies issues with swallowing pills.    Past Medical History:   Diagnosis Date    Cancer 12/21/2023    Headache     Hypertension     145/93,  not medicated    SVT (supraventricular tachycardia)        Past Surgical History:   Procedure Laterality Date    ESOPHAGOGASTRODUODENOSCOPY N/A 8/7/2024    Procedure: EGD (ESOPHAGOGASTRODUODENOSCOPY);  Surgeon: Gail Chandler MD;  Location: [facility];  Service: General;  Laterality: N/A;  with dilation  1st  @ 6:00  EGD ATTEMPTED; ABORTED    SUBOCCIPITAL CRANIOTOMY Right 01/04/2024    Procedure: CRANIOTOMY, SUBOCCIPITAL MIS;  Surgeon: Luis Wallace MD;  Location: [facility] OR [address] [place] [address];  Service: Neurosurgery;  Laterality: Right;       Review of patient's allergies indicates:  No Known Allergies    Current Facility-Administered Medications on File Prior to Encounter   Medication    mupirocin 2 % ointment    [DISCONTINUED] 0.9 % NaCl with KCl 20 mEq infusion    [DISCONTINUED] acetaminophen tablet 650 mg    [DISCONTINUED] adenosine injection 12 mg    [DISCONTINUED] dexAMETHasone injection 3 mg    [DISCONTINUED] EScitalopram oxalate tablet 10 mg    [DISCONTINUED] guaiFENesin-codeine 100-10 mg/5 ml syrup 5 mL    [DISCONTINUED] levalbuterol nebulizer solution 1.25 mg    [DISCONTINUED] LIDOcaine (PF) 10 mg/ml (1%) injection 10 mg    [DISCONTINUED] metoprolol injection 5 mg    [DISCONTINUED] metoprolol tartrate (LOPRESSOR) tablet 25 mg    [DISCONTINUED] morphine injection 1 mg    [DISCONTINUED] ondansetron injection 4 mg    [DISCONTINUED] pantoprazole injection 40 mg    [DISCONTINUED] piperacillin-tazobactam (ZOSYN) 4.5 g in D5W 100 mL IVPB (MB+)    [DISCONTINUED] prochlorperazine injection Soln 5 mg     [DISCONTINUED] promethazine (PHENERGAN) 25 mg in 0.9% NaCl 50 mL IVPB    [DISCONTINUED] sodium chloride 0.9% flush 10 mL    [DISCONTINUED] SUMAtriptan succinate injection 6 mg    [DISCONTINUED] traMADoL tablet 50 mg     Current Outpatient Medications on File Prior to Encounter   Medication Sig    acetaminophen (TYLENOL) 500 MG tablet Take 500 mg by mouth every 4 (four) hours as needed.    dexAMETHasone (DECADRON) 4 MG Tab --a single 2mg tablet every 6 hours for 3 days --a single 2mg tablet every 8 hours for 3 days --a single 2mg tablet every 12 hours for 4 days --a single 2mg tablet every morning for 4 days (Patient taking differently: --a single 2mg tablet every 6 hours for 3 days --a single 2mg tablet every 8 hours for 3 days --a single 2mg tablet every 12 hours for 4 days --a single 2mg tablet every morning for 4 days    Pt taking once daily 4mg as of 1.24.24)    EScitalopram oxalate (LEXAPRO) 10 MG tablet Take 1 tablet (10 mg total) by mouth once daily.    lomustine (GLEOSTINE) 100 MG capsule Take 200 mg (2 capsules) by mouth at bedtime on day 1 of each 42 day cycle.    LORazepam (ATIVAN) 0.5 MG tablet Take 0.5 mg by mouth daily as needed.    metoprolol tartrate (LOPRESSOR) 25 MG tablet Take 25 mg by mouth 2 (two) times daily.    ondansetron (ZOFRAN) 8 MG tablet Take 8 mg by mouth every 8 (eight) hours as needed.    pantoprazole (PROTONIX) 40 MG tablet Take 40 mg by mouth once daily.    prochlorperazine (COMPAZINE) 10 MG tablet Take 10 mg by mouth once.    promethazine (PHENERGAN) 12.5 MG Tab Take 12.5 mg by mouth every 4 (four) hours as needed.    traMADoL (ULTRAM) 50 mg tablet Take 50 mg by mouth.     Family History       Problem Relation (Age of Onset)    Cancer Maternal Grandfather    Colon cancer Maternal Grandfather    Hyperlipidemia Mother    Hypertension Mother, Father          Tobacco Use    Smoking status: Never     Passive exposure: Never    Smokeless tobacco: Never   Substance and Sexual Activity     Alcohol use: Not Currently    Drug use: Never    Sexual activity: Yes     Partners: Female     Birth control/protection: None     Review of Systems   Constitutional:  Positive for activity change. Negative for appetite change and chills.   HENT:  Positive for trouble swallowing. Negative for congestion and dental problem.    Eyes:  Negative for discharge and itching.   Respiratory:  Negative for apnea, chest tightness, shortness of breath and wheezing.    Cardiovascular:  Negative for chest pain, palpitations and leg swelling.   Gastrointestinal:  Negative for abdominal distention and abdominal pain.   Endocrine: Negative for cold intolerance and heat intolerance.   Genitourinary:  Negative for difficulty urinating and dysuria.   Musculoskeletal:  Negative for arthralgias and back pain.   Skin:  Negative for color change and pallor.   Allergic/Immunologic: Negative for environmental allergies and food allergies.   Neurological:  Negative for dizziness and facial asymmetry.   Hematological:  Negative for adenopathy. Does not bruise/bleed easily.   Psychiatric/Behavioral:  Negative for agitation and behavioral problems.      Objective:     Vital Signs (Most Recent):  Temp: 98.3 °F (36.8 °C) (08/10/24 0817)  Pulse: 84 (08/10/24 1113)  Resp: 18 (08/10/24 0857)  BP: 129/77 (08/10/24 0817)  SpO2: (!) 94 % (08/10/24 0817) Vital Signs (24h Range):  Temp:  [97.9 °F (36.6 °C)-98.3 °F (36.8 °C)] 98.3 °F (36.8 °C)  Pulse:  [67-86] 84  Resp:  [16-18] 18  SpO2:  [94 %-96 %] 94 %  BP: (119-137)/(20-92) 129/77     Weight: 91.6 kg (202 lb)  Body mass index is 28.98 kg/m².     Physical Exam  Constitutional:       General: He is not in acute distress.     Appearance: Normal appearance. He is not ill-appearing, toxic-appearing or diaphoretic.   HENT:      Head: Normocephalic and atraumatic.      Nose: Nose normal.      Mouth/Throat:      Mouth: Mucous membranes are moist.   Eyes:      Extraocular Movements: Extraocular movements  intact.      Conjunctiva/sclera: Conjunctivae normal.      Pupils: Pupils are equal, round, and reactive to light.   Cardiovascular:      Rate and Rhythm: Normal rate and regular rhythm.      Pulses: Normal pulses.      Heart sounds: Normal heart sounds. No murmur heard.     No gallop.   Pulmonary:      Effort: Pulmonary effort is normal. No respiratory distress.      Breath sounds: Normal breath sounds. No wheezing or rales.   Abdominal:      General: Abdomen is flat. Bowel sounds are normal. There is no distension.      Palpations: Abdomen is soft.      Tenderness: There is no abdominal tenderness. There is no guarding.   Musculoskeletal:         General: No swelling. Normal range of motion.      Cervical back: Normal range of motion and neck supple.   Skin:     General: Skin is warm and dry.      Capillary Refill: Capillary refill takes less than 2 seconds.   Neurological:      General: No focal deficit present.      Mental Status: He is alert and oriented to person, place, and time. Mental status is at baseline.   Psychiatric:         Mood and Affect: Mood normal.         Behavior: Behavior normal.         Thought Content: Thought content normal.         Judgment: Judgment normal.              CRANIAL NERVES     CN III, IV, VI   Pupils are equal, round, and reactive to light.       Significant Labs: All pertinent labs within the past 24 hours have been reviewed.    Significant Imaging: I have reviewed all pertinent imaging results/findings within the past 24 hours.  Assessment/Plan:     * Esophageal stricture  -progressive dysphagia over the past 6 months or so.  Admitted at [facility] after EGD dilation aborted due to large food burden in proximal esophagus with vomiting and concern for possible aspiration.  Transferred for GI evaluation  -esophagram 05/03/2024: moderate dilation of the distal aspect of the esophagus, may indicate the presence of achalasia/longstanding stricture  Plan:  -GI consulted, appreciate  recommendations  -maintain NPO for now, able to tolerate medications.  Attempt to replace p.o. with IV formulations for now  -aspiration precautions  -PPI      SVT (supraventricular tachycardia)  -chronic, had been uncontrolled during last hospitalization and not responsive to multiple IV Lopressor, IVF.  Ultimately requiring adenosine with improvement  -currently stable  -continue Lopressor  -monitor on tele      Aspiration pneumonia  Patient has a diagnosis of pneumonia. The cause of the pneumonia is suspected to be bacterial in etiology but organism is not known. The pneumonia is stable. The patient has the following signs/symptoms of pneumonia: cough. The patient does not have a current oxygen requirement and the patient does not have a home oxygen requirement. I have reviewed the pertinent imaging. The following cultures have been collected: Sputum culture The culture results are listed below.     Current antimicrobial regimen consists of the antibiotics listed below. Will monitor patient closely and improving    Antibiotics (From admission, onward)      Start     Stop Route Frequency Ordered    08/10/24 0700  piperacillin-tazobactam (ZOSYN) 4.5 g in D5W 100 mL IVPB (MB+)         -- IV Every 8 hours (non-standard times) 08/10/24 0317            Microbiology Results (last 7 days)       ** No results found for the last 168 hours. **            Gastroesophageal reflux disease without esophagitis  -PPI      Diffuse midline glioma, H3 K27M mutant  -status post subtotal resection 01/2024, radiation completed 03/2024  -follows with [facility], on lomustine  -Cont dexamethasone, IV formulation due to stricture, planning to taper outpatient      Leukocytosis  Likely steroid induced, monitor      KIM (generalized anxiety disorder)  Lexapro        VTE Risk Mitigation (From admission, onward)           Ordered     enoxaparin injection 40 mg  Daily         08/10/24 0842     IP VTE HIGH RISK PATIENT  Once         08/10/24 0842      Place sequential compression device  Until discontinued         08/10/24 9333                                    Ishaan Calix DO  Department of Hospital Medicine  [facility]

## 2024-08-10 NOTE — SUBJECTIVE & OBJECTIVE
Past Medical History:   Diagnosis Date    Cancer 12/21/2023    Headache     Hypertension     145/93,  not medicated    SVT (supraventricular tachycardia)        Past Surgical History:   Procedure Laterality Date    ESOPHAGOGASTRODUODENOSCOPY N/A 8/7/2024    Procedure: EGD (ESOPHAGOGASTRODUODENOSCOPY);  Surgeon: Gail Chandler MD;  Location: [facility];  Service: General;  Laterality: N/A;  with dilation  1st  @ 6:00  EGD ATTEMPTED; ABORTED    SUBOCCIPITAL CRANIOTOMY Right 01/04/2024    Procedure: CRANIOTOMY, SUBOCCIPITAL MIS;  Surgeon: Luis Wallace MD;  Location: [address];  Service: Neurosurgery;  Laterality: Right;       Review of patient's allergies indicates:  No Known Allergies    Current Facility-Administered Medications on File Prior to Encounter   Medication    mupirocin 2 % ointment    [DISCONTINUED] 0.9 % NaCl with KCl 20 mEq infusion    [DISCONTINUED] acetaminophen tablet 650 mg    [DISCONTINUED] adenosine injection 12 mg    [DISCONTINUED] dexAMETHasone injection 3 mg    [DISCONTINUED] EScitalopram oxalate tablet 10 mg    [DISCONTINUED] guaiFENesin-codeine 100-10 mg/5 ml syrup 5 mL    [DISCONTINUED] levalbuterol nebulizer solution 1.25 mg    [DISCONTINUED] LIDOcaine (PF) 10 mg/ml (1%) injection 10 mg    [DISCONTINUED] metoprolol injection 5 mg    [DISCONTINUED] metoprolol tartrate (LOPRESSOR) tablet 25 mg    [DISCONTINUED] morphine injection 1 mg    [DISCONTINUED] ondansetron injection 4 mg    [DISCONTINUED] pantoprazole injection 40 mg    [DISCONTINUED] piperacillin-tazobactam (ZOSYN) 4.5 g in D5W 100 mL IVPB (MB+)    [DISCONTINUED] prochlorperazine injection Soln 5 mg    [DISCONTINUED] promethazine (PHENERGAN) 25 mg in 0.9% NaCl 50 mL IVPB    [DISCONTINUED] sodium chloride 0.9% flush 10 mL    [DISCONTINUED] SUMAtriptan succinate injection 6 mg    [DISCONTINUED] traMADoL tablet 50 mg     Current Outpatient Medications on File Prior to Encounter   Medication Sig    acetaminophen (TYLENOL) 500  MG tablet Take 500 mg by mouth every 4 (four) hours as needed.    dexAMETHasone (DECADRON) 4 MG Tab --a single 2mg tablet every 6 hours for 3 days --a single 2mg tablet every 8 hours for 3 days --a single 2mg tablet every 12 hours for 4 days --a single 2mg tablet every morning for 4 days (Patient taking differently: --a single 2mg tablet every 6 hours for 3 days --a single 2mg tablet every 8 hours for 3 days --a single 2mg tablet every 12 hours for 4 days --a single 2mg tablet every morning for 4 days    Pt taking once daily 4mg as of 1.24.24)    EScitalopram oxalate (LEXAPRO) 10 MG tablet Take 1 tablet (10 mg total) by mouth once daily.    lomustine (GLEOSTINE) 100 MG capsule Take 200 mg (2 capsules) by mouth at bedtime on day 1 of each 42 day cycle.    LORazepam (ATIVAN) 0.5 MG tablet Take 0.5 mg by mouth daily as needed.    metoprolol tartrate (LOPRESSOR) 25 MG tablet Take 25 mg by mouth 2 (two) times daily.    ondansetron (ZOFRAN) 8 MG tablet Take 8 mg by mouth every 8 (eight) hours as needed.    pantoprazole (PROTONIX) 40 MG tablet Take 40 mg by mouth once daily.    prochlorperazine (COMPAZINE) 10 MG tablet Take 10 mg by mouth once.    promethazine (PHENERGAN) 12.5 MG Tab Take 12.5 mg by mouth every 4 (four) hours as needed.    traMADoL (ULTRAM) 50 mg tablet Take 50 mg by mouth.     Family History       Problem Relation (Age of Onset)    Cancer Maternal Grandfather    Colon cancer Maternal Grandfather    Hyperlipidemia Mother    Hypertension Mother, Father          Tobacco Use    Smoking status: Never     Passive exposure: Never    Smokeless tobacco: Never   Substance and Sexual Activity    Alcohol use: Not Currently    Drug use: Never    Sexual activity: Yes     Partners: Female     Birth control/protection: None     Review of Systems   Constitutional:  Positive for activity change. Negative for appetite change and chills.   HENT:  Positive for trouble swallowing. Negative for congestion and dental problem.     Eyes:  Negative for discharge and itching.   Respiratory:  Negative for apnea, chest tightness, shortness of breath and wheezing.    Cardiovascular:  Negative for chest pain, palpitations and leg swelling.   Gastrointestinal:  Negative for abdominal distention and abdominal pain.   Endocrine: Negative for cold intolerance and heat intolerance.   Genitourinary:  Negative for difficulty urinating and dysuria.   Musculoskeletal:  Negative for arthralgias and back pain.   Skin:  Negative for color change and pallor.   Allergic/Immunologic: Negative for environmental allergies and food allergies.   Neurological:  Negative for dizziness and facial asymmetry.   Hematological:  Negative for adenopathy. Does not bruise/bleed easily.   Psychiatric/Behavioral:  Negative for agitation and behavioral problems.      Objective:     Vital Signs (Most Recent):  Temp: 98.3 °F (36.8 °C) (08/10/24 0817)  Pulse: 84 (08/10/24 1113)  Resp: 18 (08/10/24 0857)  BP: 129/77 (08/10/24 0817)  SpO2: (!) 94 % (08/10/24 0817) Vital Signs (24h Range):  Temp:  [97.9 °F (36.6 °C)-98.3 °F (36.8 °C)] 98.3 °F (36.8 °C)  Pulse:  [67-86] 84  Resp:  [16-18] 18  SpO2:  [94 %-96 %] 94 %  BP: (119-137)/(20-92) 129/77     Weight: 91.6 kg (202 lb)  Body mass index is 28.98 kg/m².     Physical Exam  Constitutional:       General: He is not in acute distress.     Appearance: Normal appearance. He is not ill-appearing, toxic-appearing or diaphoretic.   HENT:      Head: Normocephalic and atraumatic.      Nose: Nose normal.      Mouth/Throat:      Mouth: Mucous membranes are moist.   Eyes:      Extraocular Movements: Extraocular movements intact.      Conjunctiva/sclera: Conjunctivae normal.      Pupils: Pupils are equal, round, and reactive to light.   Cardiovascular:      Rate and Rhythm: Normal rate and regular rhythm.      Pulses: Normal pulses.      Heart sounds: Normal heart sounds. No murmur heard.     No gallop.   Pulmonary:      Effort: Pulmonary effort  is normal. No respiratory distress.      Breath sounds: Normal breath sounds. No wheezing or rales.   Abdominal:      General: Abdomen is flat. Bowel sounds are normal. There is no distension.      Palpations: Abdomen is soft.      Tenderness: There is no abdominal tenderness. There is no guarding.   Musculoskeletal:         General: No swelling. Normal range of motion.      Cervical back: Normal range of motion and neck supple.   Skin:     General: Skin is warm and dry.      Capillary Refill: Capillary refill takes less than 2 seconds.   Neurological:      General: No focal deficit present.      Mental Status: He is alert and oriented to person, place, and time. Mental status is at baseline.   Psychiatric:         Mood and Affect: Mood normal.         Behavior: Behavior normal.         Thought Content: Thought content normal.         Judgment: Judgment normal.              CRANIAL NERVES     CN III, IV, VI   Pupils are equal, round, and reactive to light.       Significant Labs: All pertinent labs within the past 24 hours have been reviewed.    Significant Imaging: I have reviewed all pertinent imaging results/findings within the past 24 hours.

## 2024-08-10 NOTE — NURSING
[facility] arrived to Med Surg floor at 0116. Facesheet and ambulance transport sheet provided to [facility] staff as well as report. Patient safely ambulated to stretcher and was safely wheeled out by [facility] staff at approximately 0125. Spouse at the bedside collected personal items for herself and patient. Report given to RN at [facility] at 0138.

## 2024-08-10 NOTE — ASSESSMENT & PLAN NOTE
-progressive dysphagia over the past 6 months or so.  Admitted at [facility] after EGD dilation aborted due to large food burden in proximal esophagus with vomiting and concern for possible aspiration.  Transferred for GI evaluation  -esophagram 05/03/2024: moderate dilation of the distal aspect of the esophagus, may indicate the presence of achalasia/longstanding stricture  Plan:  -GI consulted, appreciate recommendations  -maintain NPO for now, able to tolerate medications.  Attempt to replace p.o. with IV formulations for now  -aspiration precautions  -PPI

## 2024-08-10 NOTE — HPI
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    Per sign out: 'Procedure was aborted due to large food burden in proximal esophagus with vomiting, and concern for possible aspiration. Patient required placement on O2 via NC, with BP stable and remained afebrile. CXR showed retrocardiac airspace opacity consistent with pneumonia with mild opacity also persisting in the left perihilar region. Patient started on zosyn. Patient's course is further c/b SVT. Cardiology was consulted and notes that this morning patient remained with sustained HR 170s after 5 mg IV Lopressor, 2.5 mg IV Lopressor, and 500 cc bolus NS. BP became hypotensive and pt's hemodynamics slowly became unstable. Pt was given 6 mg Adenosine IV push with flush. Crash cart at beside with pads attached. Post adenosine, HR improved to SR 90s. Pt stable, tolerated well. No immediate complications. Patient remains on PO metoprolol, with further recommendations from cardiology to follow. [facility] wishes to transfer for further GI eval, with patient NPO and HOB 30 degrees. If concern for airway protection, may need upgrade. Oncology will consult.'    Patient states that he has been dealing with dysphagia for a while.  Most notably 3 months ago he started experiencing retching and vomiting after meals.  About 2 weeks ago, episodes occurred later at night.  Esophagram was done noting moderate dilation of the distal aspect of the esophagus, may indicate the presence of achalasia/longstanding stricture.  Currently denies any fevers, chills, nausea, vomiting, abdominal pain, chest pain, shortness of breath.  Denies issues with swallowing pills.

## 2024-08-10 NOTE — ASSESSMENT & PLAN NOTE
-status post subtotal resection 01/2024, radiation completed 03/2024  -follows with NSGY, on lomustine  -Cont dexamethasone, IV formulation due to stricture, planning to taper outpatient

## 2024-08-10 NOTE — ASSESSMENT & PLAN NOTE
Patient has a diagnosis of pneumonia. The cause of the pneumonia is suspected to be bacterial in etiology but organism is not known. The pneumonia is stable. The patient has the following signs/symptoms of pneumonia: cough. The patient does not have a current oxygen requirement and the patient does not have a home oxygen requirement. I have reviewed the pertinent imaging. The following cultures have been collected: Sputum culture The culture results are listed below.     Current antimicrobial regimen consists of the antibiotics listed below. Will monitor patient closely and improving    Antibiotics (From admission, onward)      Start     Stop Route Frequency Ordered    08/10/24 0700  piperacillin-tazobactam (ZOSYN) 4.5 g in D5W 100 mL IVPB (MB+)         -- IV Every 8 hours (non-standard times) 08/10/24 0317            Microbiology Results (last 7 days)       ** No results found for the last 168 hours. **

## 2024-08-11 LAB
ALBUMIN SERPL BCP-MCNC: 2.9 G/DL (ref 3.5–5.2)
ALP SERPL-CCNC: 45 U/L (ref 55–135)
ALT SERPL W/O P-5'-P-CCNC: 15 U/L (ref 10–44)
ANION GAP SERPL CALC-SCNC: 9 MMOL/L (ref 8–16)
AST SERPL-CCNC: 8 U/L (ref 10–40)
BASOPHILS # BLD AUTO: 0.01 K/UL (ref 0–0.2)
BASOPHILS NFR BLD: 0.2 % (ref 0–1.9)
BILIRUB SERPL-MCNC: 1 MG/DL (ref 0.1–1)
BUN SERPL-MCNC: 8 MG/DL (ref 6–20)
CALCIUM SERPL-MCNC: 9.1 MG/DL (ref 8.7–10.5)
CHLORIDE SERPL-SCNC: 106 MMOL/L (ref 95–110)
CO2 SERPL-SCNC: 24 MMOL/L (ref 23–29)
CREAT SERPL-MCNC: 0.8 MG/DL (ref 0.5–1.4)
DIFFERENTIAL METHOD BLD: ABNORMAL
EOSINOPHIL # BLD AUTO: 0 K/UL (ref 0–0.5)
EOSINOPHIL NFR BLD: 0.6 % (ref 0–8)
ERYTHROCYTE [DISTWIDTH] IN BLOOD BY AUTOMATED COUNT: 13.9 % (ref 11.5–14.5)
EST. GFR  (NO RACE VARIABLE): >60 ML/MIN/1.73 M^2
GLUCOSE SERPL-MCNC: 89 MG/DL (ref 70–110)
HCT VFR BLD AUTO: 37.2 % (ref 40–54)
HGB BLD-MCNC: 11.9 G/DL (ref 14–18)
IMM GRANULOCYTES # BLD AUTO: 0.02 K/UL (ref 0–0.04)
IMM GRANULOCYTES NFR BLD AUTO: 0.4 % (ref 0–0.5)
LYMPHOCYTES # BLD AUTO: 0.8 K/UL (ref 1–4.8)
LYMPHOCYTES NFR BLD: 16.7 % (ref 18–48)
MAGNESIUM SERPL-MCNC: 1.7 MG/DL (ref 1.6–2.6)
MCH RBC QN AUTO: 30.9 PG (ref 27–31)
MCHC RBC AUTO-ENTMCNC: 32 G/DL (ref 32–36)
MCV RBC AUTO: 97 FL (ref 82–98)
MONOCYTES # BLD AUTO: 0.3 K/UL (ref 0.3–1)
MONOCYTES NFR BLD: 6 % (ref 4–15)
NEUTROPHILS # BLD AUTO: 3.7 K/UL (ref 1.8–7.7)
NEUTROPHILS NFR BLD: 76.1 % (ref 38–73)
NRBC BLD-RTO: 0 /100 WBC
PLATELET # BLD AUTO: 114 K/UL (ref 150–450)
PMV BLD AUTO: 8.4 FL (ref 9.2–12.9)
POTASSIUM SERPL-SCNC: 3.4 MMOL/L (ref 3.5–5.1)
PROT SERPL-MCNC: 6.3 G/DL (ref 6–8.4)
RBC # BLD AUTO: 3.85 M/UL (ref 4.6–6.2)
SODIUM SERPL-SCNC: 139 MMOL/L (ref 136–145)
WBC # BLD AUTO: 4.84 K/UL (ref 3.9–12.7)

## 2024-08-11 PROCEDURE — 36415 COLL VENOUS BLD VENIPUNCTURE: CPT | Performed by: STUDENT IN AN ORGANIZED HEALTH CARE EDUCATION/TRAINING PROGRAM

## 2024-08-11 PROCEDURE — 94761 N-INVAS EAR/PLS OXIMETRY MLT: CPT

## 2024-08-11 PROCEDURE — 25000003 PHARM REV CODE 250: Performed by: STUDENT IN AN ORGANIZED HEALTH CARE EDUCATION/TRAINING PROGRAM

## 2024-08-11 PROCEDURE — 83735 ASSAY OF MAGNESIUM: CPT | Performed by: STUDENT IN AN ORGANIZED HEALTH CARE EDUCATION/TRAINING PROGRAM

## 2024-08-11 PROCEDURE — 25000242 PHARM REV CODE 250 ALT 637 W/ HCPCS: Performed by: STUDENT IN AN ORGANIZED HEALTH CARE EDUCATION/TRAINING PROGRAM

## 2024-08-11 PROCEDURE — 80053 COMPREHEN METABOLIC PANEL: CPT | Performed by: STUDENT IN AN ORGANIZED HEALTH CARE EDUCATION/TRAINING PROGRAM

## 2024-08-11 PROCEDURE — 63600175 PHARM REV CODE 636 W HCPCS: Performed by: STUDENT IN AN ORGANIZED HEALTH CARE EDUCATION/TRAINING PROGRAM

## 2024-08-11 PROCEDURE — 85025 COMPLETE CBC W/AUTO DIFF WBC: CPT | Performed by: STUDENT IN AN ORGANIZED HEALTH CARE EDUCATION/TRAINING PROGRAM

## 2024-08-11 PROCEDURE — 20600001 HC STEP DOWN PRIVATE ROOM

## 2024-08-11 PROCEDURE — 94640 AIRWAY INHALATION TREATMENT: CPT

## 2024-08-11 RX ADMIN — ESCITALOPRAM OXALATE 10 MG: 10 TABLET ORAL at 08:08

## 2024-08-11 RX ADMIN — ENOXAPARIN SODIUM 40 MG: 40 INJECTION SUBCUTANEOUS at 05:08

## 2024-08-11 RX ADMIN — PIPERACILLIN SODIUM AND TAZOBACTAM SODIUM 4.5 G: 4; .5 INJECTION, POWDER, FOR SOLUTION INTRAVENOUS at 06:08

## 2024-08-11 RX ADMIN — LEVALBUTEROL 1.25 MG: 1.25 SOLUTION, CONCENTRATE RESPIRATORY (INHALATION) at 08:08

## 2024-08-11 RX ADMIN — METOPROLOL TARTRATE 25 MG: 25 TABLET, FILM COATED ORAL at 08:08

## 2024-08-11 RX ADMIN — METOPROLOL TARTRATE 25 MG: 25 TABLET, FILM COATED ORAL at 09:08

## 2024-08-11 RX ADMIN — POTASSIUM BICARBONATE 20 MEQ: 391 TABLET, EFFERVESCENT ORAL at 09:08

## 2024-08-11 RX ADMIN — PIPERACILLIN SODIUM AND TAZOBACTAM SODIUM 4.5 G: 4; .5 INJECTION, POWDER, FOR SOLUTION INTRAVENOUS at 02:08

## 2024-08-11 RX ADMIN — DEXAMETHASONE SODIUM PHOSPHATE 3 MG: 4 INJECTION INTRA-ARTICULAR; INTRALESIONAL; INTRAMUSCULAR; INTRAVENOUS; SOFT TISSUE at 08:08

## 2024-08-11 RX ADMIN — PROMETHAZINE HYDROCHLORIDE 25 MG: 25 INJECTION INTRAMUSCULAR; INTRAVENOUS at 09:08

## 2024-08-11 RX ADMIN — PANTOPRAZOLE SODIUM 40 MG: 40 INJECTION, POWDER, FOR SOLUTION INTRAVENOUS at 08:08

## 2024-08-11 RX ADMIN — LEVALBUTEROL 1.25 MG: 1.25 SOLUTION, CONCENTRATE RESPIRATORY (INHALATION) at 01:08

## 2024-08-11 RX ADMIN — PIPERACILLIN SODIUM AND TAZOBACTAM SODIUM 4.5 G: 4; .5 INJECTION, POWDER, FOR SOLUTION INTRAVENOUS at 09:08

## 2024-08-11 NOTE — SUBJECTIVE & OBJECTIVE
Interval History: Seen this AM at bedside.  No acute events overnight.  S/p EGD w dilation and manometry today w/o complications.  Denies chest pain, palpitations    Review of Systems  Objective:     Vital Signs (Most Recent):  Temp: 97.9 °F (36.6 °C) (08/11/24 0745)  Pulse: 75 (08/11/24 0847)  Resp: 18 (08/11/24 0847)  BP: 131/86 (08/11/24 0745)  SpO2: (!) 94 % (08/11/24 0847) Vital Signs (24h Range):  Temp:  [97.2 °F (36.2 °C)-98 °F (36.7 °C)] 97.9 °F (36.6 °C)  Pulse:  [68-87] 75  Resp:  [16-20] 18  SpO2:  [92 %-95 %] 94 %  BP: (117-134)/(76-96) 131/86     Weight: 91.6 kg (202 lb)  Body mass index is 28.98 kg/m².    Intake/Output Summary (Last 24 hours) at 8/11/2024 1109  Last data filed at 8/11/2024 0522  Gross per 24 hour   Intake 566.01 ml   Output 0 ml   Net 566.01 ml         Physical Exam  Constitutional:       General: He is not in acute distress.     Appearance: Normal appearance. He is not ill-appearing, toxic-appearing or diaphoretic.   HENT:      Head: Normocephalic and atraumatic.      Mouth/Throat:      Mouth: Mucous membranes are moist.   Eyes:      Extraocular Movements: Extraocular movements intact.      Conjunctiva/sclera: Conjunctivae normal.      Pupils: Pupils are equal, round, and reactive to light.   Cardiovascular:      Rate and Rhythm: Normal rate and regular rhythm.      Pulses: Normal pulses.      Heart sounds: Normal heart sounds. No murmur heard.  Pulmonary:      Effort: Pulmonary effort is normal. No respiratory distress.      Breath sounds: Normal breath sounds. No wheezing.   Abdominal:      General: Abdomen is flat. Bowel sounds are normal. There is no distension.      Palpations: Abdomen is soft.      Tenderness: There is no abdominal tenderness.   Musculoskeletal:         General: No swelling. Normal range of motion.      Cervical back: Normal range of motion.   Skin:     General: Skin is warm and dry.      Capillary Refill: Capillary refill takes less than 2 seconds.    Neurological:      General: No focal deficit present.      Mental Status: He is alert. Mental status is at baseline.   Psychiatric:         Mood and Affect: Mood normal.         Thought Content: Thought content normal.             Significant Labs: All pertinent labs within the past 24 hours have been reviewed.    Significant Imaging: I have reviewed all pertinent imaging results/findings within the past 24 hours.

## 2024-08-11 NOTE — NURSING
"[facility] Plan of Care Note    Dx:   Esophageal stricture [K22.2]    Shift Events: NA    Goals of Care: SAFETY    Neuro: SEE FLOW CHART    Vital Signs: /76   Pulse 71   Temp 97.9 °F (36.6 °C)   Resp 18   Ht 5' 10" (1.778 m)   Wt 91.6 kg (202 lb)   SpO2 (!) 94%   BMI 28.98 kg/m²     Respiratory: ROOM AIR WNL    Diet: Diet NPO,Diet Clear Liquid      Is patient tolerating current diet? YES    GTTS: NA    Urine Output/Bowel Movement:   I/O this shift:  In: 566 [P.O.:240; IV Piggyback:326]  Out: 0   Last Bowel Movement: 08/09/24      Drains/Tubes/Tube Feeds (include total output/shift):   I/O this shift:  In: 566 [P.O.:240; IV Piggyback:326]  Out: 0       Lines: SL PIV X2 WNL      Accuchecks:NONE    Skin: WNL    Fall Risk Score: MOD    Activity level? STAND BY ASSIST    Any scheduled procedures? NONE 8/11    Any safety concerns? ASPIRATIONS PREC    Other: NA   "

## 2024-08-11 NOTE — PLAN OF CARE
Problem: Adult Inpatient Plan of Care  Goal: Plan of Care Review  Outcome: Progressing     Problem: Infection  Goal: Absence of Infection Signs and Symptoms  Outcome: Progressing     Problem: Fall Injury Risk  Goal: Absence of Fall and Fall-Related Injury  Outcome: Progressing

## 2024-08-11 NOTE — HOSPITAL COURSE
Admitted as transfer from [facility] due to aborted EGD that was done for stricture dilation.  GI consulted, planning for EGD Monday 8/12.  Patient requires dilation in order to continue with chemotherapy due to high-risk for infection.  Currently treating with IV Zosyn for aspiration pneumonia from previous hospitalization. EGD was done, noting significant dilation of the entire esophagus suspicious for achalasia.  Esophagus was dilated.  Biopsies were obtained.  Esophageal plaques were also found consistent candidiasis, started on fluconazole.  Esophageal manometry was then done, results pending

## 2024-08-11 NOTE — PLAN OF CARE
Problem: Adult Inpatient Plan of Care  Goal: Plan of Care Review  8/10/2024 1912 by Ayde Priest RN  Outcome: Progressing  8/10/2024 1741 by Ayde Priest RN  Outcome: Progressing  Goal: Patient-Specific Goal (Individualized)  8/10/2024 1912 by Ayde Priest RN  Outcome: Progressing  8/10/2024 1741 by Ayde Priest RN  Outcome: Progressing  Goal: Absence of Hospital-Acquired Illness or Injury  8/10/2024 1912 by Ayde Priest RN  Outcome: Progressing  8/10/2024 1741 by Ayde Priest RN  Outcome: Progressing  Goal: Optimal Comfort and Wellbeing  8/10/2024 1912 by Ayde Priest RN  Outcome: Progressing  8/10/2024 1741 by Ayde Priest RN  Outcome: Progressing  Goal: Readiness for Transition of Care  8/10/2024 1912 by Ayde Priest RN  Outcome: Progressing  8/10/2024 1741 by Ayde Priest RN  Outcome: Progressing     Problem: Infection  Goal: Absence of Infection Signs and Symptoms  8/10/2024 1912 by Ayde Priest RN  Outcome: Progressing  8/10/2024 1741 by Ayde Priest RN  Outcome: Progressing

## 2024-08-11 NOTE — ASSESSMENT & PLAN NOTE
Achalasia  -progressive dysphagia over the past 6 months or so.  Admitted at [facility] after EGD dilation aborted due to large food burden in proximal esophagus with vomiting and concern for possible aspiration.  Transferred for GI evaluation  -esophagram 05/03/2024: moderate dilation of the distal aspect of the esophagus, may indicate the presence of achalasia/longstanding stricture  -EGD 8/12: significant dilation of the entire esophagus suspicious for achalasia.  Esophagus was dilated.  Biopsies were obtained.  Esophageal plaques were also found consistent candidiasis  Plan:  -GI consulted, s/p EGD with the above findings.  Manometry done as well  -f/u biopsy, manometry results  -Fluconazole 400mg x1, 200mg daily x13 days  -Regular diet  -aspiration precautions  -PPI

## 2024-08-11 NOTE — NURSING
[facility] Plan of Care Note  Dx Esophagel stricture    Shift Events Had a shower during the shift. Advanced to clear liquid diet. Pt threw up once during the shift. Refused PRN Zofran    Goals of Care: Discharge    Neuro: AOx4    Vital Signs: See flowsheet    Respiratory: RA    Diet: CLD    Is patient tolerating current diet? Yes    GTTS: None    Urine Output/Bowel Movement: see flowsheet. LBM: 8/9/24    Drains/Tubes/Tube Feeds (include total output/shift): None    Lines: 2PIVs      Accuchecks:None    Skin: Intact    Fall Risk Score: Per chart    Activity level? Indepdent    Any scheduled procedures? EGD on Monday    Any safety concerns? None    Other: Aspiration precaution

## 2024-08-11 NOTE — H&P
[facility]  History & Physical    Patient Name: Sukumar Wiseman  MRN: [id number]  Patient Class: IP- Inpatient  Admission Date: 8/10/2024  Attending Physician: Ishaan Calix DO   Primary Care Provider: Larry Nicholas Jr., MD         Patient information was obtained from patient, spouse/SO, and ER records.     Subjective:     Principal Problem:Esophageal stricture    Chief Complaint: No chief complaint on file.       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    Per sign out: 'Procedure was aborted due to large food burden in proximal esophagus with vomiting, and concern for possible aspiration. Patient required placement on O2 via NC, with BP stable and remained afebrile. CXR showed retrocardiac airspace opacity consistent with pneumonia with mild opacity also persisting in the left perihilar region. Patient started on zosyn. Patient's course is further c/b SVT. Cardiology was consulted and notes that this morning patient remained with sustained HR 170s after 5 mg IV Lopressor, 2.5 mg IV Lopressor, and 500 cc bolus NS. BP became hypotensive and pt's hemodynamics slowly became unstable. Pt was given 6 mg Adenosine IV push with flush. Crash cart at beside with pads attached. Post adenosine, HR improved to SR 90s. Pt stable, tolerated well. No immediate complications. Patient remains on PO metoprolol, with further recommendations from cardiology to follow. [facility] wishes to transfer for further GI eval, with patient NPO and HOB 30 degrees. If concern for airway protection, may need upgrade. Oncology will consult.'    Patient states that he has been dealing with dysphagia for a while.  Most notably 3 months ago he started experiencing retching and vomiting after meals.  About 2 weeks ago, episodes  occurred later at night.  Esophagram was done noting moderate dilation of the distal aspect of the esophagus, may indicate the presence of achalasia/longstanding stricture.  Currently denies any fevers, chills, nausea, vomiting, abdominal pain, chest pain, shortness of breath.  Denies issues with swallowing pills.    Interval History: Seen this AM at bedside.  No acute events overnight.  Able to tolerate clear liquid diet and medications.  Denies chest pain, palpitations    Review of Systems  Objective:     Vital Signs (Most Recent):  Temp: 97.9 °F (36.6 °C) (08/11/24 0745)  Pulse: 75 (08/11/24 0847)  Resp: 18 (08/11/24 0847)  BP: 131/86 (08/11/24 0745)  SpO2: (!) 94 % (08/11/24 0847) Vital Signs (24h Range):  Temp:  [97.2 °F (36.2 °C)-98 °F (36.7 °C)] 97.9 °F (36.6 °C)  Pulse:  [68-87] 75  Resp:  [16-20] 18  SpO2:  [92 %-95 %] 94 %  BP: (117-134)/(76-96) 131/86     Weight: 91.6 kg (202 lb)  Body mass index is 28.98 kg/m².    Intake/Output Summary (Last 24 hours) at 8/11/2024 1109  Last data filed at 8/11/2024 0522  Gross per 24 hour   Intake 566.01 ml   Output 0 ml   Net 566.01 ml         Physical Exam  Constitutional:       General: He is not in acute distress.     Appearance: Normal appearance. He is not ill-appearing, toxic-appearing or diaphoretic.   HENT:      Head: Normocephalic and atraumatic.      Mouth/Throat:      Mouth: Mucous membranes are moist.   Eyes:      Extraocular Movements: Extraocular movements intact.      Conjunctiva/sclera: Conjunctivae normal.      Pupils: Pupils are equal, round, and reactive to light.   Cardiovascular:      Rate and Rhythm: Normal rate and regular rhythm.      Pulses: Normal pulses.      Heart sounds: Normal heart sounds. No murmur heard.  Pulmonary:      Effort: Pulmonary effort is normal. No respiratory distress.      Breath sounds: Normal breath sounds. No wheezing.   Abdominal:      General: Abdomen is flat. Bowel sounds are normal. There is no distension.       Palpations: Abdomen is soft.      Tenderness: There is no abdominal tenderness.   Musculoskeletal:         General: No swelling. Normal range of motion.      Cervical back: Normal range of motion.   Skin:     General: Skin is warm and dry.      Capillary Refill: Capillary refill takes less than 2 seconds.   Neurological:      General: No focal deficit present.      Mental Status: He is alert. Mental status is at baseline.   Psychiatric:         Mood and Affect: Mood normal.         Thought Content: Thought content normal.             Significant Labs: All pertinent labs within the past 24 hours have been reviewed.    Significant Imaging: I have reviewed all pertinent imaging results/findings within the past 24 hours.  Assessment/Plan:     * Esophageal stricture  -progressive dysphagia over the past 6 months or so.  Admitted at [facility] after EGD dilation aborted due to large food burden in proximal esophagus with vomiting and concern for possible aspiration.  Transferred for GI evaluation  -esophagram 05/03/2024: moderate dilation of the distal aspect of the esophagus, may indicate the presence of achalasia/longstanding stricture  Plan:  -GI consulted, planning for EGD tomorrow, NPO at midnight.  Holding Lovenox  -continue CLD.  Attempt to replace p.o. with IV formulations for now  -aspiration precautions  -PPI      SVT (supraventricular tachycardia)  -chronic, had been uncontrolled during last hospitalization and not responsive to multiple IV Lopressor, IVF.  Ultimately requiring adenosine with improvement  -currently stable  -continue Lopressor  -monitor on tele      Aspiration pneumonia  Patient has a diagnosis of pneumonia. The cause of the pneumonia is suspected to be bacterial in etiology but organism is not known. The pneumonia is stable. The patient has the following signs/symptoms of pneumonia: cough. The patient does not have a current oxygen requirement and the patient does not have a home oxygen requirement.  I have reviewed the pertinent imaging. The following cultures have been collected: Sputum culture The culture results are listed below.     Current antimicrobial regimen consists of the antibiotics listed below. Will monitor patient closely and improving    Antibiotics (From admission, onward)      Start     Stop Route Frequency Ordered    08/10/24 0700  piperacillin-tazobactam (ZOSYN) 4.5 g in D5W 100 mL IVPB (MB+)         -- IV Every 8 hours (non-standard times) 08/10/24 0317            Microbiology Results (last 7 days)       ** No results found for the last 168 hours. **            Gastroesophageal reflux disease without esophagitis  -PPI      Diffuse midline glioma, H3 K27M mutant  -status post subtotal resection 01/2024, radiation completed 03/2024  -follows with NS[facility], on lomustine  -Cont dexamethasone, IV formulation due to stricture, planning to taper outpatient      Leukocytosis  Likely steroid induced, monitor      KIM (generalized anxiety disorder)  Lexapro        VTE Risk Mitigation (From admission, onward)           Ordered     enoxaparin injection 40 mg  Daily         08/10/24 1601     IP VTE HIGH RISK PATIENT  Once         08/10/24 0842     Place sequential compression device  Until discontinued         08/10/24 0842                                    Ishaan Calix DO  Department of Hospital Medicine  [facility]

## 2024-08-12 ENCOUNTER — ANESTHESIA EVENT (OUTPATIENT)
Dept: ENDOSCOPY | Facility: HOSPITAL | Age: 39
DRG: 391 | End: 2024-08-12
Payer: COMMERCIAL

## 2024-08-12 ENCOUNTER — ANESTHESIA (OUTPATIENT)
Dept: ENDOSCOPY | Facility: HOSPITAL | Age: 39
DRG: 391 | End: 2024-08-12
Payer: COMMERCIAL

## 2024-08-12 LAB
ALBUMIN SERPL BCP-MCNC: 3.2 G/DL (ref 3.5–5.2)
ALP SERPL-CCNC: 43 U/L (ref 55–135)
ALT SERPL W/O P-5'-P-CCNC: 19 U/L (ref 10–44)
ANION GAP SERPL CALC-SCNC: 11 MMOL/L (ref 8–16)
AST SERPL-CCNC: 10 U/L (ref 10–40)
BASOPHILS # BLD AUTO: 0.02 K/UL (ref 0–0.2)
BASOPHILS NFR BLD: 0.4 % (ref 0–1.9)
BILIRUB SERPL-MCNC: 1 MG/DL (ref 0.1–1)
BUN SERPL-MCNC: 8 MG/DL (ref 6–20)
CALCIUM SERPL-MCNC: 9.4 MG/DL (ref 8.7–10.5)
CHLORIDE SERPL-SCNC: 105 MMOL/L (ref 95–110)
CO2 SERPL-SCNC: 24 MMOL/L (ref 23–29)
CREAT SERPL-MCNC: 0.9 MG/DL (ref 0.5–1.4)
DIFFERENTIAL METHOD BLD: ABNORMAL
EOSINOPHIL # BLD AUTO: 0 K/UL (ref 0–0.5)
EOSINOPHIL NFR BLD: 0.6 % (ref 0–8)
ERYTHROCYTE [DISTWIDTH] IN BLOOD BY AUTOMATED COUNT: 13.5 % (ref 11.5–14.5)
EST. GFR  (NO RACE VARIABLE): >60 ML/MIN/1.73 M^2
GLUCOSE SERPL-MCNC: 94 MG/DL (ref 70–110)
HCT VFR BLD AUTO: 39.5 % (ref 40–54)
HGB BLD-MCNC: 12.8 G/DL (ref 14–18)
IMM GRANULOCYTES # BLD AUTO: 0.03 K/UL (ref 0–0.04)
IMM GRANULOCYTES NFR BLD AUTO: 0.6 % (ref 0–0.5)
LYMPHOCYTES # BLD AUTO: 1 K/UL (ref 1–4.8)
LYMPHOCYTES NFR BLD: 21.5 % (ref 18–48)
MAGNESIUM SERPL-MCNC: 1.7 MG/DL (ref 1.6–2.6)
MCH RBC QN AUTO: 31.5 PG (ref 27–31)
MCHC RBC AUTO-ENTMCNC: 32.4 G/DL (ref 32–36)
MCV RBC AUTO: 97 FL (ref 82–98)
MONOCYTES # BLD AUTO: 0.4 K/UL (ref 0.3–1)
MONOCYTES NFR BLD: 8.2 % (ref 4–15)
NEUTROPHILS # BLD AUTO: 3.3 K/UL (ref 1.8–7.7)
NEUTROPHILS NFR BLD: 68.7 % (ref 38–73)
NRBC BLD-RTO: 0 /100 WBC
PLATELET # BLD AUTO: 151 K/UL (ref 150–450)
PMV BLD AUTO: 8.4 FL (ref 9.2–12.9)
POTASSIUM SERPL-SCNC: 3.4 MMOL/L (ref 3.5–5.1)
PROT SERPL-MCNC: 6.6 G/DL (ref 6–8.4)
RBC # BLD AUTO: 4.06 M/UL (ref 4.6–6.2)
SODIUM SERPL-SCNC: 140 MMOL/L (ref 136–145)
WBC # BLD AUTO: 4.78 K/UL (ref 3.9–12.7)

## 2024-08-12 PROCEDURE — 20600001 HC STEP DOWN PRIVATE ROOM

## 2024-08-12 PROCEDURE — 83735 ASSAY OF MAGNESIUM: CPT | Performed by: STUDENT IN AN ORGANIZED HEALTH CARE EDUCATION/TRAINING PROGRAM

## 2024-08-12 PROCEDURE — 0DB38ZX EXCISION OF LOWER ESOPHAGUS, VIA NATURAL OR ARTIFICIAL OPENING ENDOSCOPIC, DIAGNOSTIC: ICD-10-PCS | Performed by: STUDENT IN AN ORGANIZED HEALTH CARE EDUCATION/TRAINING PROGRAM

## 2024-08-12 PROCEDURE — 88342 IMHCHEM/IMCYTCHM 1ST ANTB: CPT | Performed by: STUDENT IN AN ORGANIZED HEALTH CARE EDUCATION/TRAINING PROGRAM

## 2024-08-12 PROCEDURE — 63600175 PHARM REV CODE 636 W HCPCS: Performed by: NURSE ANESTHETIST, CERTIFIED REGISTERED

## 2024-08-12 PROCEDURE — 43239 EGD BIOPSY SINGLE/MULTIPLE: CPT | Mod: 59 | Performed by: INTERNAL MEDICINE

## 2024-08-12 PROCEDURE — 43249 ESOPH EGD DILATION <30 MM: CPT | Mod: ,,, | Performed by: INTERNAL MEDICINE

## 2024-08-12 PROCEDURE — 91010 ESOPHAGUS MOTILITY STUDY: CPT | Mod: TC | Performed by: INTERNAL MEDICINE

## 2024-08-12 PROCEDURE — 63600175 PHARM REV CODE 636 W HCPCS: Performed by: STUDENT IN AN ORGANIZED HEALTH CARE EDUCATION/TRAINING PROGRAM

## 2024-08-12 PROCEDURE — 88342 IMHCHEM/IMCYTCHM 1ST ANTB: CPT | Mod: 26,,, | Performed by: STUDENT IN AN ORGANIZED HEALTH CARE EDUCATION/TRAINING PROGRAM

## 2024-08-12 PROCEDURE — 99900035 HC TECH TIME PER 15 MIN (STAT)

## 2024-08-12 PROCEDURE — D9220A PRA ANESTHESIA: Mod: CRNA,,, | Performed by: NURSE ANESTHETIST, CERTIFIED REGISTERED

## 2024-08-12 PROCEDURE — 80053 COMPREHEN METABOLIC PANEL: CPT | Performed by: STUDENT IN AN ORGANIZED HEALTH CARE EDUCATION/TRAINING PROGRAM

## 2024-08-12 PROCEDURE — 88305 TISSUE EXAM BY PATHOLOGIST: CPT | Performed by: STUDENT IN AN ORGANIZED HEALTH CARE EDUCATION/TRAINING PROGRAM

## 2024-08-12 PROCEDURE — D9220A PRA ANESTHESIA: Mod: ANES,,, | Performed by: ANESTHESIOLOGY

## 2024-08-12 PROCEDURE — 88312 SPECIAL STAINS GROUP 1: CPT | Mod: 59 | Performed by: STUDENT IN AN ORGANIZED HEALTH CARE EDUCATION/TRAINING PROGRAM

## 2024-08-12 PROCEDURE — 25000003 PHARM REV CODE 250: Performed by: STUDENT IN AN ORGANIZED HEALTH CARE EDUCATION/TRAINING PROGRAM

## 2024-08-12 PROCEDURE — 25000003 PHARM REV CODE 250: Performed by: NURSE ANESTHETIST, CERTIFIED REGISTERED

## 2024-08-12 PROCEDURE — 37000009 HC ANESTHESIA EA ADD 15 MINS: Performed by: INTERNAL MEDICINE

## 2024-08-12 PROCEDURE — 25000003 PHARM REV CODE 250: Performed by: INTERNAL MEDICINE

## 2024-08-12 PROCEDURE — 88305 TISSUE EXAM BY PATHOLOGIST: CPT | Mod: 26,,, | Performed by: STUDENT IN AN ORGANIZED HEALTH CARE EDUCATION/TRAINING PROGRAM

## 2024-08-12 PROCEDURE — 43249 ESOPH EGD DILATION <30 MM: CPT | Performed by: INTERNAL MEDICINE

## 2024-08-12 PROCEDURE — 36415 COLL VENOUS BLD VENIPUNCTURE: CPT | Performed by: STUDENT IN AN ORGANIZED HEALTH CARE EDUCATION/TRAINING PROGRAM

## 2024-08-12 PROCEDURE — C1726 CATH, BAL DIL, NON-VASCULAR: HCPCS | Performed by: INTERNAL MEDICINE

## 2024-08-12 PROCEDURE — 0DB18ZX EXCISION OF UPPER ESOPHAGUS, VIA NATURAL OR ARTIFICIAL OPENING ENDOSCOPIC, DIAGNOSTIC: ICD-10-PCS | Performed by: STUDENT IN AN ORGANIZED HEALTH CARE EDUCATION/TRAINING PROGRAM

## 2024-08-12 PROCEDURE — 94761 N-INVAS EAR/PLS OXIMETRY MLT: CPT

## 2024-08-12 PROCEDURE — 27201012 HC FORCEPS, HOT/COLD, DISP: Performed by: INTERNAL MEDICINE

## 2024-08-12 PROCEDURE — 43239 EGD BIOPSY SINGLE/MULTIPLE: CPT | Mod: 59,,, | Performed by: INTERNAL MEDICINE

## 2024-08-12 PROCEDURE — 91037 ESOPH IMPED FUNCTION TEST: CPT | Mod: TC | Performed by: INTERNAL MEDICINE

## 2024-08-12 PROCEDURE — 25000003 PHARM REV CODE 250: Performed by: ANESTHESIOLOGY

## 2024-08-12 PROCEDURE — 88312 SPECIAL STAINS GROUP 1: CPT | Mod: 26,,, | Performed by: STUDENT IN AN ORGANIZED HEALTH CARE EDUCATION/TRAINING PROGRAM

## 2024-08-12 PROCEDURE — 37000008 HC ANESTHESIA 1ST 15 MINUTES: Performed by: INTERNAL MEDICINE

## 2024-08-12 PROCEDURE — 85025 COMPLETE CBC W/AUTO DIFF WBC: CPT | Performed by: STUDENT IN AN ORGANIZED HEALTH CARE EDUCATION/TRAINING PROGRAM

## 2024-08-12 RX ORDER — SCOLOPAMINE TRANSDERMAL SYSTEM 1 MG/1
1 PATCH, EXTENDED RELEASE TRANSDERMAL
Status: DISCONTINUED | OUTPATIENT
Start: 2024-08-12 | End: 2024-08-13 | Stop reason: HOSPADM

## 2024-08-12 RX ORDER — PROPOFOL 10 MG/ML
INJECTION, EMULSION INTRAVENOUS
Status: DISCONTINUED | OUTPATIENT
Start: 2024-08-12 | End: 2024-08-12

## 2024-08-12 RX ORDER — LIDOCAINE HYDROCHLORIDE 20 MG/ML
INJECTION INTRAVENOUS
Status: DISCONTINUED | OUTPATIENT
Start: 2024-08-12 | End: 2024-08-12

## 2024-08-12 RX ORDER — SODIUM CHLORIDE 0.9 % (FLUSH) 0.9 %
3 SYRINGE (ML) INJECTION
Status: DISCONTINUED | OUTPATIENT
Start: 2024-08-12 | End: 2024-08-12 | Stop reason: HOSPADM

## 2024-08-12 RX ORDER — PROPOFOL 10 MG/ML
VIAL (ML) INTRAVENOUS CONTINUOUS PRN
Status: DISCONTINUED | OUTPATIENT
Start: 2024-08-12 | End: 2024-08-12

## 2024-08-12 RX ORDER — ROCURONIUM BROMIDE 10 MG/ML
INJECTION, SOLUTION INTRAVENOUS
Status: DISCONTINUED | OUTPATIENT
Start: 2024-08-12 | End: 2024-08-12

## 2024-08-12 RX ORDER — SUCCINYLCHOLINE CHLORIDE 20 MG/ML
INJECTION INTRAMUSCULAR; INTRAVENOUS
Status: DISCONTINUED | OUTPATIENT
Start: 2024-08-12 | End: 2024-08-12

## 2024-08-12 RX ORDER — GLUCAGON 1 MG
1 KIT INJECTION
Status: DISCONTINUED | OUTPATIENT
Start: 2024-08-12 | End: 2024-08-12 | Stop reason: HOSPADM

## 2024-08-12 RX ORDER — LIDOCAINE HYDROCHLORIDE 20 MG/ML
JELLY TOPICAL ONCE
Status: COMPLETED | OUTPATIENT
Start: 2024-08-12 | End: 2024-08-12

## 2024-08-12 RX ORDER — ONDANSETRON HYDROCHLORIDE 2 MG/ML
INJECTION, SOLUTION INTRAVENOUS
Status: DISCONTINUED | OUTPATIENT
Start: 2024-08-12 | End: 2024-08-12

## 2024-08-12 RX ORDER — PROCHLORPERAZINE EDISYLATE 5 MG/ML
5 INJECTION INTRAMUSCULAR; INTRAVENOUS EVERY 30 MIN PRN
Status: DISCONTINUED | OUTPATIENT
Start: 2024-08-12 | End: 2024-08-12 | Stop reason: HOSPADM

## 2024-08-12 RX ORDER — ENOXAPARIN SODIUM 100 MG/ML
40 INJECTION SUBCUTANEOUS EVERY 24 HOURS
Status: DISCONTINUED | OUTPATIENT
Start: 2024-08-13 | End: 2024-08-13 | Stop reason: HOSPADM

## 2024-08-12 RX ADMIN — METOPROLOL TARTRATE 25 MG: 25 TABLET, FILM COATED ORAL at 09:08

## 2024-08-12 RX ADMIN — PROPOFOL 225 MCG/KG/MIN: 10 INJECTION, EMULSION INTRAVENOUS at 08:08

## 2024-08-12 RX ADMIN — PROPOFOL 200 MG: 10 INJECTION, EMULSION INTRAVENOUS at 08:08

## 2024-08-12 RX ADMIN — DEXAMETHASONE SODIUM PHOSPHATE 3 MG: 4 INJECTION INTRA-ARTICULAR; INTRALESIONAL; INTRAMUSCULAR; INTRAVENOUS; SOFT TISSUE at 08:08

## 2024-08-12 RX ADMIN — ROCURONIUM BROMIDE 5 MG: 10 INJECTION, SOLUTION INTRAVENOUS at 08:08

## 2024-08-12 RX ADMIN — SCOPOLAMINE 1 PATCH: 1.5 PATCH, EXTENDED RELEASE TRANSDERMAL at 08:08

## 2024-08-12 RX ADMIN — SUCCINYLCHOLINE CHLORIDE 160 MG: 20 INJECTION, SOLUTION INTRAMUSCULAR; INTRAVENOUS at 08:08

## 2024-08-12 RX ADMIN — LIDOCAINE HYDROCHLORIDE 10 ML: 20 JELLY TOPICAL at 11:08

## 2024-08-12 RX ADMIN — PANTOPRAZOLE SODIUM 40 MG: 40 INJECTION, POWDER, FOR SOLUTION INTRAVENOUS at 09:08

## 2024-08-12 RX ADMIN — METOPROLOL TARTRATE 25 MG: 25 TABLET, FILM COATED ORAL at 08:08

## 2024-08-12 RX ADMIN — TRAMADOL HYDROCHLORIDE 50 MG: 50 TABLET, COATED ORAL at 07:08

## 2024-08-12 RX ADMIN — PROMETHAZINE HYDROCHLORIDE 25 MG: 25 INJECTION INTRAMUSCULAR; INTRAVENOUS at 09:08

## 2024-08-12 RX ADMIN — ESCITALOPRAM OXALATE 10 MG: 10 TABLET ORAL at 10:08

## 2024-08-12 RX ADMIN — LIDOCAINE HYDROCHLORIDE 60 MG: 20 INJECTION INTRAVENOUS at 08:08

## 2024-08-12 RX ADMIN — ONDANSETRON 4 MG: 2 INJECTION INTRAMUSCULAR; INTRAVENOUS at 08:08

## 2024-08-12 RX ADMIN — SODIUM CHLORIDE: 0.9 INJECTION, SOLUTION INTRAVENOUS at 07:08

## 2024-08-12 RX ADMIN — PIPERACILLIN SODIUM AND TAZOBACTAM SODIUM 4.5 G: 4; .5 INJECTION, POWDER, FOR SOLUTION INTRAVENOUS at 12:08

## 2024-08-12 NOTE — NURSING
Pt back in room from procedure.pt aaox with abc's intact.  Pt denies NAD and none noted. Bed alarm on, at lowest, locked, sr up x 2, call light within reach. Wife at bedside. Pt/wife instructed to call for assistance. Will cont poc

## 2024-08-12 NOTE — [employer] PATIENT INSTRUCTIONS
Discharge Summary/Instructions after an Endoscopic Procedure  Patient Name: Sukumar Wiseman  Patient MRN: [id number]  Patient YOB: 1985 Monday, August 12, 2024  Vaughn Ramsye MD  Dear patient,  As a result of recent federal legislation (The Federal Cures Act), you may   receive lab or pathology results from your procedure in your [facility]   account before your physician is able to contact you. Your physician or   their representative will relay the results to you with their   recommendations at their soonest availability.  Thank you,  RESTRICTIONS:  During your procedure today, you received medications for sedation.  These   medications may affect your judgment, balance and coordination.  Therefore,   for 24 hours, you have the following restrictions:   - DO NOT drive a car, operate machinery, make legal/financial decisions,   sign important papers or drink alcohol.    ACTIVITY:  Today: no heavy lifting, straining or running due to procedural   sedation/anesthesia.  The following day: return to full activity including work.  DIET:  Eat and drink normally unless instructed otherwise.     TREATMENT FOR COMMON SIDE EFFECTS:  - Mild abdominal pain, nausea, belching, bloating or excessive gas:  rest,   eat lightly and use a heating pad.  - Sore Throat: treat with throat lozenges and/or gargle with warm salt   water.  - Because air was used during the procedure, expelling large amounts of air   from your rectum or belching is normal.  - If a bowel prep was taken, you may not have a bowel movement for 1-3 days.    This is normal.  SYMPTOMS TO WATCH FOR AND REPORT TO YOUR PHYSICIAN:  1. Abdominal pain or bloating, other than gas cramps.  2. Chest pain.  3. Back pain.  4. Signs of infection such as: chills or fever occurring within 24 hours   after the procedure.  5. Rectal bleeding, which would show as bright red, maroon, or black stools.   (A tablespoon of blood from the rectum is not serious, especially if    hemorrhoids are present.)  6. Vomiting.  7. Weakness or dizziness.  GO DIRECTLY TO THE NEAREST EMERGENCY ROOM IF YOU HAVE ANY OF THE FOLLOWING:      Difficulty breathing              Chills and/or fever over 101 F   Persistent vomiting and/or vomiting blood   Severe abdominal pain   Severe chest pain   Black, tarry stools   Bleeding- more than one tablespoon   Any other symptom or condition that you feel may need urgent attention  Your doctor recommends these additional instructions:  If any biopsies were taken, your doctors clinic will contact you in 1 to 2   weeks with any results.  - Return patient to [facility] for ongoing care.   - Resume previous diet today.   - Perform routine esophageal manometry at the next available appointment.   - Continue present medications.   - Await pathology results.   - Return to referring physician.   - Diflucan (fluconazole) 400 mg PO daily X 1 dose today.    - Diflucan (fluconazole) 200 mg PO daily for 13 days starting tomorrow.  For questions, problems or results please call your physician - Vaughn Ramsey MD at Work:  [phone].  [facility] [place], EMERGENCY ROOM PHONE NUMBER: [phone]  IF A COMPLICATION OR EMERGENCY SITUATION ARISES AND YOU ARE UNABLE TO REACH   YOUR PHYSICIAN - GO DIRECTLY TO THE EMERGENCY ROOM.  Vaughn Ramsey MD  8/12/2024 9:59:41 AM  This report has been verified and signed electronically.  Dear patient,  As a result of recent federal legislation (The Federal Cures Act), you may   receive lab or pathology results from your procedure in your [facility]   account before your physician is able to contact you. Your physician or   their representative will relay the results to you with their   recommendations at their soonest availability.  Thank you,  [employer]ATION

## 2024-08-12 NOTE — H&P
Short Stay Endoscopy History and Physical    PCP - Larry Nicholas Jr., MD     Procedure - EGD  ASA - per anesthesia  Mallampati - per anesthesia  History of Anesthesia problems - no  Family history Anesthesia problems -  no   Plan of anesthesia - General    HPI:  This is a 38 y.o. male here for evaluation of : Esophageal Stricture       ROS:  Constitutional: No fevers, chills, No weight loss  CV: No chest pain  Pulm: No cough, No shortness of breath  Ophtho: No vision changes  GI: see HPI  Derm: No rash    Medical History:  has a past medical history of Cancer (12/21/2023), Headache, Hypertension, and SVT (supraventricular tachycardia).    Surgical History:  has a past surgical history that includes Suboccipital craniotomy (Right, 01/04/2024) and Esophagogastroduodenoscopy (N/A, 8/7/2024).    Family History: family history includes Cancer in his maternal grandfather; Colon cancer in his maternal grandfather; Hyperlipidemia in his mother; Hypertension in his father and mother.. Otherwise no colon cancer, inflammatory bowel disease, or GI malignancies.    Social History:  reports that he has never smoked. He has never been exposed to tobacco smoke. He has never used smokeless tobacco. He reports that he does not currently use alcohol. He reports that he does not use drugs.    Review of patient's allergies indicates:  No Known Allergies    Medications:   Medications Prior to Admission   Medication Sig Dispense Refill Last Dose    acetaminophen (TYLENOL) 500 MG tablet Take 500 mg by mouth every 4 (four) hours as needed.       dexAMETHasone (DECADRON) 4 MG Tab --a single 2mg tablet every 6 hours for 3 days --a single 2mg tablet every 8 hours for 3 days --a single 2mg tablet every 12 hours for 4 days --a single 2mg tablet every morning for 4 days (Patient taking differently: --a single 2mg tablet every 6 hours for 3 days --a single 2mg tablet every 8 hours for 3 days --a single 2mg tablet every 12 hours for 4 days  --a single 2mg tablet every morning for 4 days    Pt taking once daily 4mg as of 1.24.24) 33 tablet 0     EScitalopram oxalate (LEXAPRO) 10 MG tablet Take 1 tablet (10 mg total) by mouth once daily. 30 tablet 11     lomustine (GLEOSTINE) 100 MG capsule Take 200 mg (2 capsules) by mouth at bedtime on day 1 of each 42 day cycle. 2 capsule 0     LORazepam (ATIVAN) 0.5 MG tablet Take 0.5 mg by mouth daily as needed.       metoprolol tartrate (LOPRESSOR) 25 MG tablet Take 25 mg by mouth 2 (two) times daily.       ondansetron (ZOFRAN) 8 MG tablet Take 8 mg by mouth every 8 (eight) hours as needed.       pantoprazole (PROTONIX) 40 MG tablet Take 40 mg by mouth once daily.       prochlorperazine (COMPAZINE) 10 MG tablet Take 10 mg by mouth once.       promethazine (PHENERGAN) 12.5 MG Tab Take 12.5 mg by mouth every 4 (four) hours as needed.       traMADoL (ULTRAM) 50 mg tablet Take 50 mg by mouth.          Physical Exam:    Vital Signs:   Vitals:    08/12/24 0730   BP:    Pulse:    Resp: 18   Temp:        General Appearance: Well appearing in no acute distress  Eyes:    No scleral icterus  ENT: Neck supple, Lips, mucosa, and tongue normal; teeth and gums normal  Abdomen: Soft, non tender, non distended with normal bowel sounds. No hepatosplenomegaly, ascites, or mass.  Extremities: No edema  Skin: No rash    Labs:  Lab Results   Component Value Date    WBC 4.78 08/12/2024    HGB 12.8 (L) 08/12/2024    HCT 39.5 (L) 08/12/2024     08/12/2024    CHOL 191 01/04/2024    TRIG 95 01/04/2024    HDL 34 (L) 01/04/2024    ALT 19 08/12/2024    AST 10 08/12/2024     08/12/2024    K 3.4 (L) 08/12/2024     08/12/2024    CREATININE 0.9 08/12/2024    BUN 8 08/12/2024    CO2 24 08/12/2024    TSH 0.539 01/04/2024    INR 1.0 01/04/2024    HGBA1C 5.2 01/04/2024       I have explained the risks and benefits of endoscopy procedures to the patient including but not limited to bleeding, perforation, infection, and death.  The  patient was asked if they understand and allowed to ask any further questions to their satisfaction.      Dawn Emerson, DO

## 2024-08-12 NOTE — ANESTHESIA POSTPROCEDURE EVALUATION
Anesthesia Post Evaluation    Patient: Sukumar Wiseman    Procedure(s) Performed: Procedure(s) (LRB):  EGD (ESOPHAGOGASTRODUODENOSCOPY) (N/A)    Final Anesthesia Type: general      Patient location during evaluation: floor  Patient participation: Yes- Able to Participate  Level of consciousness: awake and alert and awake  Post-procedure vital signs: reviewed and stable  Pain management: adequate  Airway patency: patent    PONV status at discharge: No PONV  Anesthetic complications: no      Cardiovascular status: blood pressure returned to baseline  Respiratory status: unassisted and spontaneous ventilation  Hydration status: euvolemic  Follow-up not needed.              Vitals Value Taken Time   /88 08/12/24 0930   Temp 36.6 °C (97.9 °F) 08/12/24 0908   Pulse 73 08/12/24 0930   Resp 17 08/12/24 0930   SpO2 96 % 08/12/24 0930         Event Time   Out of Recovery 09:32:12         Pain/Zoltan Score: Pain Rating Prior to Med Admin: 6 (8/12/2024  7:30 AM)  Pain Rating Post Med Admin: 0 (8/11/2024  8:33 PM)  Zoltan Score: 10 (8/12/2024  9:30 AM)           Walk in Private Auto

## 2024-08-12 NOTE — PROGRESS NOTES
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    Per sign out: 'Procedure was aborted due to large food burden in proximal esophagus with vomiting, and concern for possible aspiration. Patient required placement on O2 via NC, with BP stable and remained afebrile. CXR showed retrocardiac airspace opacity consistent with pneumonia with mild opacity also persisting in the left perihilar region. Patient started on zosyn. Patient's course is further c/b SVT. Cardiology was consulted and notes that this morning patient remained with sustained HR 170s after 5 mg IV Lopressor, 2.5 mg IV Lopressor, and 500 cc bolus NS. BP became hypotensive and pt's hemodynamics slowly became unstable. Pt was given 6 mg Adenosine IV push with flush. Crash cart at beside with pads attached. Post adenosine, HR improved to SR 90s. Pt stable, tolerated well. No immediate complications. Patient remains on PO metoprolol, with further recommendations from cardiology to follow. [facility] wishes to transfer for further GI eval, with patient NPO and HOB 30 degrees. If concern for airway protection, may need upgrade. Oncology will consult.'    Patient states that he has been dealing with dysphagia for a while.  Most notably 3 months ago he started experiencing retching and vomiting after meals.  About 2 weeks ago, episodes occurred later at night.  Esophagram was done noting moderate dilation of the distal aspect of the esophagus, may  indicate the presence of achalasia/longstanding stricture.  Currently denies any fevers, chills, nausea, vomiting, abdominal pain, chest pain, shortness of breath.  Denies issues with swallowing pills.    Overview/Hospital Course:  Admitted as transfer from [facility] due to aborted EGD that was done for stricture dilation.  GI consulted, planning for EGD Monday 8/12.  Patient requires dilation in order to continue with chemotherapy due to high-risk for infection.  Currently treating with IV Zosyn for aspiration pneumonia from previous hospitalization. EGD was done, noting significant dilation of the entire esophagus suspicious for achalasia.  Esophagus was dilated.  Biopsies were obtained.  Esophageal plaques were also found consistent candidiasis, started on fluconazole.  Esophageal manometry was then done, results pending    Interval History: Seen this AM at bedside.  No acute events overnight.  S/p EGD w dilation and manometry today w/o complications.  Denies chest pain, palpitations    Review of Systems  Objective:     Vital Signs (Most Recent):  Temp: 97.9 °F (36.6 °C) (08/11/24 0745)  Pulse: 75 (08/11/24 0847)  Resp: 18 (08/11/24 0847)  BP: 131/86 (08/11/24 0745)  SpO2: (!) 94 % (08/11/24 0847) Vital Signs (24h Range):  Temp:  [97.2 °F (36.2 °C)-98 °F (36.7 °C)] 97.9 °F (36.6 °C)  Pulse:  [68-87] 75  Resp:  [16-20] 18  SpO2:  [92 %-95 %] 94 %  BP: (117-134)/(76-96) 131/86     Weight: 91.6 kg (202 lb)  Body mass index is 28.98 kg/m².    Intake/Output Summary (Last 24 hours) at 8/11/2024 1109  Last data filed at 8/11/2024 0522  Gross per 24 hour   Intake 566.01 ml   Output 0 ml   Net 566.01 ml         Physical Exam  Constitutional:       General: He is not in acute distress.     Appearance: Normal appearance. He is not ill-appearing, toxic-appearing or diaphoretic.   HENT:      Head: Normocephalic and atraumatic.      Mouth/Throat:      Mouth: Mucous membranes are moist.   Eyes:      Extraocular Movements: Extraocular  movements intact.      Conjunctiva/sclera: Conjunctivae normal.      Pupils: Pupils are equal, round, and reactive to light.   Cardiovascular:      Rate and Rhythm: Normal rate and regular rhythm.      Pulses: Normal pulses.      Heart sounds: Normal heart sounds. No murmur heard.  Pulmonary:      Effort: Pulmonary effort is normal. No respiratory distress.      Breath sounds: Normal breath sounds. No wheezing.   Abdominal:      General: Abdomen is flat. Bowel sounds are normal. There is no distension.      Palpations: Abdomen is soft.      Tenderness: There is no abdominal tenderness.   Musculoskeletal:         General: No swelling. Normal range of motion.      Cervical back: Normal range of motion.   Skin:     General: Skin is warm and dry.      Capillary Refill: Capillary refill takes less than 2 seconds.   Neurological:      General: No focal deficit present.      Mental Status: He is alert. Mental status is at baseline.   Psychiatric:         Mood and Affect: Mood normal.         Thought Content: Thought content normal.             Significant Labs: All pertinent labs within the past 24 hours have been reviewed.    Significant Imaging: I have reviewed all pertinent imaging results/findings within the past 24 hours.    Assessment/Plan:      * Esophageal stricture  Achalasia  -progressive dysphagia over the past 6 months or so.  Admitted at [facility] after EGD dilation aborted due to large food burden in proximal esophagus with vomiting and concern for possible aspiration.  Transferred for GI evaluation  -esophagram 05/03/2024: moderate dilation of the distal aspect of the esophagus, may indicate the presence of achalasia/longstanding stricture  -EGD 8/12: significant dilation of the entire esophagus suspicious for achalasia.  Esophagus was dilated.  Biopsies were obtained.  Esophageal plaques were also found consistent candidiasis  Plan:  -GI consulted, s/p EGD with the above findings.  Manometry done as well  -f/u  biopsy, manometry results  -Fluconazole 400mg x1, 200mg daily x13 days  -Regular diet  -aspiration precautions  -PPI      SVT (supraventricular tachycardia)  -chronic, had been uncontrolled during last hospitalization and not responsive to multiple IV Lopressor, IVF.  Ultimately requiring adenosine with improvement  -currently stable  -continue Lopressor  -monitor on tele      Aspiration pneumonia  Patient has a diagnosis of pneumonia. The cause of the pneumonia is suspected to be bacterial in etiology but organism is not known. The pneumonia is stable. The patient has the following signs/symptoms of pneumonia: cough. The patient does not have a current oxygen requirement and the patient does not have a home oxygen requirement. I have reviewed the pertinent imaging. The following cultures have been collected: Sputum culture The culture results are listed below.     Current antimicrobial regimen consists of the antibiotics listed below. Will monitor patient closely and improving    Antibiotics (From admission, onward)      Start     Stop Route Frequency Ordered    08/10/24 0700  piperacillin-tazobactam (ZOSYN) 4.5 g in D5W 100 mL IVPB (MB+)         -- IV Every 8 hours (non-standard times) 08/10/24 0317            Microbiology Results (last 7 days)       ** No results found for the last 168 hours. **            Gastroesophageal reflux disease without esophagitis  -PPI      Diffuse midline glioma, H3 K27M mutant  -status post subtotal resection 01/2024, radiation completed 03/2024  -follows with [facility], on lomustine  -Cont dexamethasone, IV formulation due to stricture, planning to taper outpatient      Leukocytosis  Likely steroid induced, monitor      KIM (generalized anxiety disorder)  Lexapro        VTE Risk Mitigation (From admission, onward)           Ordered     enoxaparin injection 40 mg  Daily         08/12/24 1454     IP VTE HIGH RISK PATIENT  Once         08/12/24 1454     Place sequential compression device   Until discontinued         08/10/24 0842                    Discharge Planning   BULMARO: 8/13/2024     Code Status: Full Code   Is the patient medically ready for discharge?:     Reason for patient still in hospital (select all that apply): Patient trending condition  Discharge Plan A: Home with family                  Ishaan Calix DO  Department of Hospital Medicine   [facility]

## 2024-08-12 NOTE — TRANSFER OF CARE
"Anesthesia Transfer of Care Note    Patient: Sukumar Wiseman    Procedure(s) Performed: Procedure(s) (LRB):  EGD (ESOPHAGOGASTRODUODENOSCOPY) (N/A)    Patient location: PACU    Anesthesia Type: general    Transport from OR: Transported from OR on room air with adequate spontaneous ventilation    Post pain: adequate analgesia    Post assessment: no apparent anesthetic complications    Post vital signs: stable    Level of consciousness: responds to stimulation and alert    Nausea/Vomiting: no nausea/vomiting    Complications: none    Transfer of care protocol was followed      Last vitals: Visit Vitals  /83 (BP Location: Left arm, Patient Position: Lying)   Pulse 71   Temp 36.6 °C (97.9 °F) (Temporal)   Resp 16   Ht 5' 10" (1.778 m)   Wt 91.6 kg (202 lb)   SpO2 97%   BMI 28.98 kg/m²     "

## 2024-08-12 NOTE — NURSING TRANSFER
Nursing Transfer Note      8/12/2024   9:32 AM    Reason patient is being transferred: Recovery criteria met    PACU nurse giving handoff: ALEXANDRA Acevedo    Nurse receiving handoff: ALEXANDRA Kat    Transfer to 1060    Transfer via stretcher    Transfer with cardiac monitoring    Transported by Patient Escort/PCT    Telemetry: Box # [id number] HR 73 NSR  Verified on & working by PACU RN: Yes    Transfer Vital Signs @ 0930  Temperature: 97.9  Blood Pressure: 131/88  Heart Rate: 73 NSR  O2 Sat: 96% on RA  Respirations: 17    Medicines/Equipment sent with patient: None    Any special needs or follow-up needed: See GI treatment plan/note    Patient belongings transferred with patient: No    Chart send with patient: Yes    Notified: Spouse, Ryan    Patient reassessed prior to transfer at: 8/12/24 @ 0930

## 2024-08-12 NOTE — PLAN OF CARE
Verified ID band, fall band on arrives to room 3 via stretcher with wife at side.Patient's concern with severe nausea with any anesthesia. , verbalizes understanding.Consents obtained. Meds prescribed pre procedure see MAR for administration.  Anesthesia to manage airway, VS, and all other medications. Procedure explained regarding anesthesia and EGD

## 2024-08-12 NOTE — NURSING
"[facility] Plan of Care Note    Dx:   Esophageal stricture [K22.2]    Shift Events: NA    Goals of Care: SAFETY, ASP. PRECAUTION    Neuro: AAOX4    Vital Signs: /85   Pulse 64   Temp 97.4 °F (36.3 °C)   Resp 19   Ht 5' 10" (1.778 m)   Wt 92.5 kg (203 lb 14.4 oz)   SpO2 95%   BMI 29.26 kg/m²     Respiratory: ROOM AIR WNL    Diet: Diet NPO      Is patient tolerating current diet? YES    GTTS: NA    Urine Output/Bowel Movement:   I/O this shift:  In: 603.5 [P.O.:240; IV Piggyback:363.5]  Out: 0   Last Bowel Movement: 08/11/24      Drains/Tubes/Tube Feeds (include total output/shift):   I/O this shift:  In: 603.5 [P.O.:240; IV Piggyback:363.5]  Out: 0       Lines: SL  PIV X2; WNL       Accuchecks:NO    Skin: WNL SEE FLOW CHART    Fall Risk Score: LOW    Activity level? UP AMB, MIN, NO ASSIST    Any scheduled procedures? EGD AM 8/12    Any safety concerns? ASPIRATION   PRECAUTIONS    Other: NA   "

## 2024-08-12 NOTE — OR NURSING
Esophageal manometry completed. Tolerated well. AAO w/o complaints. No pain at time of discharge. To hospital room per endo escort. Wife at bedside No sedation given for manometry.

## 2024-08-12 NOTE — ANESTHESIA PROCEDURE NOTES
Intubation    Date/Time: 8/12/2024 8:29 AM    Performed by: Olga Alston CRNA  Authorized by: Gil Espana MD    Intubation:     Induction:  Rapid sequence induction    Intubated:  Postinduction    Mask Ventilation:  N/a    Attempts:  1    Attempted By:  CRNA    Method of Intubation:  Video laryngoscopy    Blade:  Freitas 4    Laryngeal View Grade: Grade I - full view of cords      Difficult Airway Encountered?: No      Complications:  None    Airway Device:  Oral endotracheal tube    Airway Device Size:  7.5    Style/Cuff Inflation:  Cuffed (inflated to minimal occlusive pressure)    Tube secured:  22    Secured at:  The lips    Placement Verified By:  Capnometry    Complicating Factors:  None    Findings Post-Intubation:  BS equal bilateral and atraumatic/condition of teeth unchanged

## 2024-08-12 NOTE — ANESTHESIA PREPROCEDURE EVALUATION
08/12/2024  Sukumar Wiseman is a 38 y.o., male.      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     Per sign out: 'Procedure was aborted due to large food burden in proximal esophagus with vomiting, and concern for possible aspiration. Patient required placement on O2 via NC, with BP stable and remained afebrile. CXR showed retrocardiac airspace opacity consistent with pneumonia with mild opacity also persisting in the left perihilar region. Patient started on zosyn. Patient's course is further c/b SVT. Cardiology was consulted and notes that this morning patient remained with sustained HR 170s after 5 mg IV Lopressor, 2.5 mg IV Lopressor, and 500 cc bolus NS. BP became hypotensive and pt's hemodynamics slowly became unstable. Pt was given 6 mg Adenosine IV push with flush. Crash cart at beside with pads attached. Post adenosine, HR improved to SR 90s. Pt stable, tolerated well. No immediate complications. Patient remains on PO metoprolol, with further recommendations from cardiology to follow. [facility] wishes to transfer for further GI eval, with patient NPO and HOB 30 degrees. If concern for airway protection, may need upgrade. Oncology will consult.'     Patient states that he has been dealing with dysphagia for a while.  Most notably 3 months ago he started experiencing retching and vomiting after meals.  About 2 weeks ago, episodes occurred later at night.  Esophagram was done noting moderate dilation of the distal aspect of the esophagus, may indicate the presence of achalasia/longstanding stricture.  Currently denies any fevers, chills, nausea, vomiting, abdominal pain, chest pain, shortness of breath.  Denies issues  with swallowing pills.         Pre-op Assessment    I have reviewed the Patient Summary Reports.     I have reviewed the Nursing Notes. I have reviewed the NPO Status.   I have reviewed the Medications.     Review of Systems  Anesthesia Hx:  No problems with previous Anesthesia             Denies Family Hx of Anesthesia complications.    Denies Personal Hx of Anesthesia complications.                    Social:  Non-Smoker       Hematology/Oncology:                      Current/Recent Cancer.         chemotherapy   Oncology Comments: CEREBELLUM     Cardiovascular:     Hypertension, well controlled              ECG has been reviewed. HX SVT                         Pulmonary:  Pulmonary Normal                       Renal/:  Renal/ Normal                 Hepatic/GI:     GERD             Neurological:      Headaches     ATAXIA                            Endocrine:  Endocrine Normal            Psych:   anxiety               Lab Results   Component Value Date    WBC 4.78 08/12/2024    HGB 12.8 (L) 08/12/2024    HCT 39.5 (L) 08/12/2024    MCV 97 08/12/2024     08/12/2024      CMP  Sodium   Date Value Ref Range Status   08/12/2024 140 136 - 145 mmol/L Final     Potassium   Date Value Ref Range Status   08/12/2024 3.4 (L) 3.5 - 5.1 mmol/L Final     Chloride   Date Value Ref Range Status   08/12/2024 105 95 - 110 mmol/L Final     CO2   Date Value Ref Range Status   08/12/2024 24 23 - 29 mmol/L Final     Glucose   Date Value Ref Range Status   08/12/2024 94 70 - 110 mg/dL Final     BUN   Date Value Ref Range Status   08/12/2024 8 6 - 20 mg/dL Final     Creatinine   Date Value Ref Range Status   08/12/2024 0.9 0.5 - 1.4 mg/dL Final     Calcium   Date Value Ref Range Status   08/12/2024 9.4 8.7 - 10.5 mg/dL Final     Total Protein   Date Value Ref Range Status   08/12/2024 6.6 6.0 - 8.4 g/dL Final     Albumin   Date Value Ref Range Status   08/12/2024 3.2 (L) 3.5 - 5.2 g/dL Final     Total Bilirubin   Date Value Ref  Range Status   08/12/2024 1.0 0.1 - 1.0 mg/dL Final     Comment:     For infants and newborns, interpretation of results should be based  on gestational age, weight and in agreement with clinical  observations.    Premature Infant recommended reference ranges:  Up to 24 hours.............<8.0 mg/dL  Up to 48 hours............<12.0 mg/dL  3-5 days..................<15.0 mg/dL  6-29 days.................<15.0 mg/dL       Alkaline Phosphatase   Date Value Ref Range Status   08/12/2024 43 (L) 55 - 135 U/L Final     AST   Date Value Ref Range Status   08/12/2024 10 10 - 40 U/L Final     ALT   Date Value Ref Range Status   08/12/2024 19 10 - 44 U/L Final     Anion Gap   Date Value Ref Range Status   08/12/2024 11 8 - 16 mmol/L Final     eGFR   Date Value Ref Range Status   08/12/2024 >60.0 >60 mL/min/1.73 m^2 Final        Physical Exam  General: Well nourished    Airway:  Mallampati: III / II  Mouth Opening: Normal  TM Distance: Normal  Tongue: Normal  Neck ROM: Normal ROM    Dental:  Intact    Chest/Lungs:  Clear to auscultation    Heart:  Rate: Normal  Rhythm: Regular Rhythm  Sounds: Normal        Anesthesia Plan  Type of Anesthesia, risks & benefits discussed:    Anesthesia Type: Gen ETT  Intra-op Monitoring Plan: Standard ASA Monitors  Post Op Pain Control Plan: multimodal analgesia  Induction:  IV  Airway Plan: Direct  Informed Consent: Informed consent signed with the Patient and all parties understand the risks and agree with anesthesia plan.  All questions answered.   ASA Score: 3  Day of Surgery Review of History & Physical: H&P Update referred to the surgeon/provider.  Anesthesia Plan Notes: Home metropolol needs to be taken prior to surgery  Will add scopalamine patch and do a TIVA to try to mitigate nausea. Will give dexamethasone 3mg as ordered by primary team.     Ready For Surgery From Anesthesia Perspective.     .

## 2024-08-12 NOTE — PLAN OF CARE
Problem: Adult Inpatient Plan of Care  Goal: Plan of Care Review  Outcome: Progressing     Problem: Infection  Goal: Absence of Infection Signs and Symptoms  Outcome: Progressing     Problem: Fall Injury Risk  Goal: Absence of Fall and Fall-Related Injury  Outcome: Progressing     Problem: Skin Injury Risk Increased  Goal: Skin Health and Integrity  Outcome: Progressing

## 2024-08-12 NOTE — PLAN OF CARE
[address] [facility]  Initial Discharge Assessment       Primary Care Provider: Larry Nicholas Jr., MD    Admission Diagnosis: Esophageal stricture [K22.2]    Admission Date: 8/10/2024  Expected Discharge Date: 8/13/2024    Transition of Care Barriers: None    Payor: BLUE [employer]OSS [employer]EFIT / Plan: [employer] [place] [place] / Product Type: Self Funded /     Extended Emergency Contact Information  Primary Emergency Contact: Ryan Wiseman  Mobile Phone: [phone]  Relation: Spouse    Discharge Plan A: Home with family  Discharge Plan B: Home with family      [facility] [address]  [address] [place] [address]  Phone: [phone] Fax: [phone]      Initial Assessment (most recent)       Adult Discharge Assessment - 08/12/24 1340          Discharge Assessment    Assessment Type Discharge Planning Reassessment     Confirmed/corrected address, phone number and insurance Yes     Confirmed Demographics Correct on Facesheet     Source of Information patient;family     Does patient/caregiver understand observation status Yes     Communicated BULMARO with patient/caregiver Yes     People in Home spouse     Do you expect to return to your current living situation? Yes     Do you have help at home or someone to help you manage your care at home? Yes     Prior to hospitilization cognitive status: Alert/Oriented     Current cognitive status: Alert/Oriented     Walking or Climbing Stairs Difficulty no     Dressing/Bathing Difficulty no     Home Layout Able to live on 1st floor     Equipment Currently Used at Home none     Readmission within 30 days? No     Patient currently being followed by outpatient case management? Yes     If yes, name of outpatient case management following: insurance company assigned oupatient case management     Do you currently have service(s) that help you manage your care at home? No     Do you take prescription medications? No     Do you have prescription  coverage? Yes     Do you have any problems affording any of your prescribed medications? No     Is the patient taking medications as prescribed? yes     How do you get to doctors appointments? car, drives self;family or friend will provide     Are you on dialysis? No     Do you take coumadin? No     Discharge Plan A Home with family     Discharge Plan B Home with family     DME Needed Upon Discharge  none     Discharge Plan discussed with: Patient     Transition of Care Barriers None                      Spoke to pt. Pt lives at home with spouse. Post hospital  stay spouse will be pt support person and pt. has transportation at d/c with spouse. There have been no hospitalizations within the last 30 days per pt. Verified pt PCP and preferred pharmacy. Pt stated not on Coumadin and is not receiving dialysis. All questions answered regarding case management/ discharge planning , pt verbalized understanding. Discharge booklet with SW contact information given to pt.     Discharge Plan A and Plan B have been determined by review of patient's clinical status, future medical and therapeutic needs, and coverage/benefits for post-acute care in coordination with multidisciplinary team members.       Sarah Paulino LCSW  Case Management/[facility]  [phone]

## 2024-08-13 ENCOUNTER — PATIENT MESSAGE (OUTPATIENT)
Dept: NEUROSURGERY | Facility: CLINIC | Age: 39
End: 2024-08-13
Payer: COMMERCIAL

## 2024-08-13 VITALS
DIASTOLIC BLOOD PRESSURE: 85 MMHG | BODY MASS INDEX: 28.92 KG/M2 | OXYGEN SATURATION: 95 % | HEIGHT: 70 IN | SYSTOLIC BLOOD PRESSURE: 119 MMHG | RESPIRATION RATE: 18 BRPM | TEMPERATURE: 98 F | HEART RATE: 83 BPM | WEIGHT: 202 LBS

## 2024-08-13 PROBLEM — D72.829 LEUKOCYTOSIS: Status: RESOLVED | Noted: 2024-01-05 | Resolved: 2024-08-13

## 2024-08-13 PROBLEM — J69.0 ASPIRATION PNEUMONIA: Status: RESOLVED | Noted: 2024-08-08 | Resolved: 2024-08-13

## 2024-08-13 LAB
ALBUMIN SERPL BCP-MCNC: 3 G/DL (ref 3.5–5.2)
ALP SERPL-CCNC: 47 U/L (ref 55–135)
ALT SERPL W/O P-5'-P-CCNC: 30 U/L (ref 10–44)
ANION GAP SERPL CALC-SCNC: 9 MMOL/L (ref 8–16)
AST SERPL-CCNC: 14 U/L (ref 10–40)
BASOPHILS # BLD AUTO: 0.02 K/UL (ref 0–0.2)
BASOPHILS NFR BLD: 0.4 % (ref 0–1.9)
BILIRUB SERPL-MCNC: 0.6 MG/DL (ref 0.1–1)
BUN SERPL-MCNC: 10 MG/DL (ref 6–20)
CALCIUM SERPL-MCNC: 9.1 MG/DL (ref 8.7–10.5)
CHLORIDE SERPL-SCNC: 105 MMOL/L (ref 95–110)
CO2 SERPL-SCNC: 25 MMOL/L (ref 23–29)
CREAT SERPL-MCNC: 0.8 MG/DL (ref 0.5–1.4)
DIFFERENTIAL METHOD BLD: ABNORMAL
EOSINOPHIL # BLD AUTO: 0 K/UL (ref 0–0.5)
EOSINOPHIL NFR BLD: 0.8 % (ref 0–8)
ERYTHROCYTE [DISTWIDTH] IN BLOOD BY AUTOMATED COUNT: 13.5 % (ref 11.5–14.5)
EST. GFR  (NO RACE VARIABLE): >60 ML/MIN/1.73 M^2
GLUCOSE SERPL-MCNC: 101 MG/DL (ref 70–110)
HCT VFR BLD AUTO: 37.9 % (ref 40–54)
HGB BLD-MCNC: 12.4 G/DL (ref 14–18)
IMM GRANULOCYTES # BLD AUTO: 0.04 K/UL (ref 0–0.04)
IMM GRANULOCYTES NFR BLD AUTO: 0.8 % (ref 0–0.5)
LYMPHOCYTES # BLD AUTO: 1 K/UL (ref 1–4.8)
LYMPHOCYTES NFR BLD: 21.9 % (ref 18–48)
MAGNESIUM SERPL-MCNC: 1.8 MG/DL (ref 1.6–2.6)
MCH RBC QN AUTO: 31.4 PG (ref 27–31)
MCHC RBC AUTO-ENTMCNC: 32.7 G/DL (ref 32–36)
MCV RBC AUTO: 96 FL (ref 82–98)
MONOCYTES # BLD AUTO: 0.4 K/UL (ref 0.3–1)
MONOCYTES NFR BLD: 8.4 % (ref 4–15)
NEUTROPHILS # BLD AUTO: 3.2 K/UL (ref 1.8–7.7)
NEUTROPHILS NFR BLD: 67.7 % (ref 38–73)
NRBC BLD-RTO: 0 /100 WBC
PLATELET # BLD AUTO: 171 K/UL (ref 150–450)
PMV BLD AUTO: 8.3 FL (ref 9.2–12.9)
POTASSIUM SERPL-SCNC: 3.5 MMOL/L (ref 3.5–5.1)
PROT SERPL-MCNC: 6.4 G/DL (ref 6–8.4)
RBC # BLD AUTO: 3.95 M/UL (ref 4.6–6.2)
SODIUM SERPL-SCNC: 139 MMOL/L (ref 136–145)
WBC # BLD AUTO: 4.74 K/UL (ref 3.9–12.7)

## 2024-08-13 PROCEDURE — 99232 SBSQ HOSP IP/OBS MODERATE 35: CPT | Mod: ,,,

## 2024-08-13 PROCEDURE — 36415 COLL VENOUS BLD VENIPUNCTURE: CPT | Performed by: STUDENT IN AN ORGANIZED HEALTH CARE EDUCATION/TRAINING PROGRAM

## 2024-08-13 PROCEDURE — 25000003 PHARM REV CODE 250: Performed by: STUDENT IN AN ORGANIZED HEALTH CARE EDUCATION/TRAINING PROGRAM

## 2024-08-13 PROCEDURE — 63600175 PHARM REV CODE 636 W HCPCS: Performed by: STUDENT IN AN ORGANIZED HEALTH CARE EDUCATION/TRAINING PROGRAM

## 2024-08-13 PROCEDURE — 83735 ASSAY OF MAGNESIUM: CPT | Performed by: STUDENT IN AN ORGANIZED HEALTH CARE EDUCATION/TRAINING PROGRAM

## 2024-08-13 PROCEDURE — 91010 ESOPHAGUS MOTILITY STUDY: CPT | Mod: 26,,, | Performed by: INTERNAL MEDICINE

## 2024-08-13 PROCEDURE — 91037 ESOPH IMPED FUNCTION TEST: CPT | Mod: 26,,, | Performed by: INTERNAL MEDICINE

## 2024-08-13 PROCEDURE — 80053 COMPREHEN METABOLIC PANEL: CPT | Performed by: STUDENT IN AN ORGANIZED HEALTH CARE EDUCATION/TRAINING PROGRAM

## 2024-08-13 PROCEDURE — 85025 COMPLETE CBC W/AUTO DIFF WBC: CPT | Performed by: STUDENT IN AN ORGANIZED HEALTH CARE EDUCATION/TRAINING PROGRAM

## 2024-08-13 RX ORDER — FLUCONAZOLE 40 MG/ML
400 POWDER, FOR SUSPENSION ORAL DAILY
Status: DISCONTINUED | OUTPATIENT
Start: 2024-08-13 | End: 2024-08-13 | Stop reason: HOSPADM

## 2024-08-13 RX ORDER — DEXAMETHASONE 1.5 MG/1
3 TABLET ORAL DAILY
Status: DISCONTINUED | OUTPATIENT
Start: 2024-08-13 | End: 2024-08-13 | Stop reason: HOSPADM

## 2024-08-13 RX ORDER — PANTOPRAZOLE SODIUM 40 MG/1
40 TABLET, DELAYED RELEASE ORAL DAILY
Status: DISCONTINUED | OUTPATIENT
Start: 2024-08-13 | End: 2024-08-13 | Stop reason: HOSPADM

## 2024-08-13 RX ORDER — PANTOPRAZOLE SODIUM 40 MG/1
40 TABLET, DELAYED RELEASE ORAL DAILY
Qty: 60 TABLET | Refills: 0 | Status: SHIPPED | OUTPATIENT
Start: 2024-08-13 | End: 2024-08-13 | Stop reason: HOSPADM

## 2024-08-13 RX ORDER — OMEPRAZOLE 100 %
20 POWDER (GRAM) MISCELLANEOUS DAILY
Qty: 500 G | Refills: 1 | Status: SHIPPED | OUTPATIENT
Start: 2024-08-13 | End: 2024-10-12

## 2024-08-13 RX ORDER — FLUCONAZOLE 10 MG/ML
200 POWDER, FOR SUSPENSION ORAL DAILY
Qty: 260 ML | Refills: 0 | Status: SHIPPED | OUTPATIENT
Start: 2024-08-14 | End: 2024-08-27

## 2024-08-13 RX ADMIN — TRAMADOL HYDROCHLORIDE 50 MG: 50 TABLET, COATED ORAL at 07:08

## 2024-08-13 RX ADMIN — METOPROLOL TARTRATE 25 MG: 25 TABLET, FILM COATED ORAL at 07:08

## 2024-08-13 RX ADMIN — DEXAMETHASONE SODIUM PHOSPHATE 3 MG: 4 INJECTION INTRA-ARTICULAR; INTRALESIONAL; INTRAMUSCULAR; INTRAVENOUS; SOFT TISSUE at 07:08

## 2024-08-13 RX ADMIN — PANTOPRAZOLE SODIUM 40 MG: 40 INJECTION, POWDER, FOR SOLUTION INTRAVENOUS at 07:08

## 2024-08-13 RX ADMIN — ESCITALOPRAM OXALATE 10 MG: 10 TABLET ORAL at 07:08

## 2024-08-13 NOTE — ASSESSMENT & PLAN NOTE
Sukumar Wiseman is a 38 y.o. male with history of progressive dysphagia secondary to esophageal stricture, cerebellar glioma currently on lomustine (tx at [facility]), who was admitted for aspiration pneumonia and SVT after EGD for dilation was aborted due to aspiration. Cardiology was consulted and he required adenosine to convert to sinus rhythm. He is on IV abx for pneumonia. GI consulted as patient was transferred from [facility] to [facility] for GI eval of esophageal stricture. 8/13 EGD with balloon dilation to 20 mm and esophageal manometry.        - Esophageal plaques were found, consistent with                          candidiasis.      - The esophageal examination was consistent with                          achalasia. Dilated.     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                          - appointment request sent to Dr. Burgos for [facility] f/u                          - Diflucan (fluconazole) 400 mg PO daily X 1 dose                          today, Diflucan (fluconazole) 200 mg PO daily for 13                          days starting tomorrow.                          - recommend full liquid diet                          - rec liquid medications if possible                          - sit up 90 degree angle with meals & meds, eat slowly

## 2024-08-13 NOTE — PROGRESS NOTES
[facility]  Gastroenterology  Progress Note    Patient Name: Sukumar Wiseman  MRN: [id number]  Admission Date: 8/10/2024  Hospital Length of Stay: 3 days  Code Status: Full Code   Attending Provider: Ishaan Calix DO  Consulting Provider: JAIRO Causey-C  Primary Care Physician: Larry Nicholas Jr., MD  Principal Problem: Esophageal stricture    No new subjective & objective note has been filed under this hospital service since the last note was generated.    Assessment/Plan:     GI  Esophageal dysphagia  Sukumar Wiseman is a 38 y.o. male with history of progressive dysphagia secondary to esophageal stricture, cerebellar glioma currently on lomustine (tx at [facility]), who was admitted for aspiration pneumonia and SVT after EGD for dilation was aborted due to aspiration. Cardiology was consulted and he required adenosine to convert to sinus rhythm. He is on IV abx for pneumonia. GI consulted as patient was transferred from [facility] to [facility] for GI eval of esophageal stricture. 8/13 EGD with balloon dilation to 20 mm and esophageal manometry.        - Esophageal plaques were found, consistent with                          candidiasis.      - The esophageal examination was consistent with                          achalasia. Dilated.     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                          - appointment request sent to Dr. Burgos for [facility] f/u                          - Diflucan (fluconazole) 400 mg PO daily X 1 dose                          today, Diflucan (fluconazole) 200 mg PO daily for 13                          days starting tomorrow.                          - recommend full liquid diet                          - rec liquid medications if possible                          - sit up 90 degree angle with meals  & meds, eat slowly            After careful discussion with Dr. Ramsey & Dr. Olea, it has been decided for Gastroenterology to sign off on this patient. Please contact for any questions or concerns.    Thank you for your consult. I will sign off. Please contact us if you have any additional questions.    Kirsten Chilel, JAIRO-C  Gastroenterology  [address] [address] [facility]

## 2024-08-13 NOTE — PROVATION PATIENT INSTRUCTIONS
Discharge Summary/Instructions after an Endoscopic Procedure  Patient Name: Sukumar Wiseman  Patient MRN: [id number]  Patient YOB: 1985 Monday, August 12, 2024  Vaughn Ramsey MD  Dear patient,  As a result of recent federal legislation (The Federal Cures Act), you may   receive lab or pathology results from your procedure in your [facility]   account before your physician is able to contact you. Your physician or   their representative will relay the results to you with their   recommendations at their soonest availability.  Thank you,  RESTRICTIONS:  During your procedure today, you received medications for sedation.  These   medications may affect your judgment, balance and coordination.  Therefore,   for 24 hours, you have the following restrictions:   - DO NOT drive a car, operate machinery, make legal/financial decisions,   sign important papers or drink alcohol.    ACTIVITY:  Today: no heavy lifting, straining or running due to procedural   sedation/anesthesia.  The following day: return to full activity including work.  DIET:  Eat and drink normally unless instructed otherwise.     TREATMENT FOR COMMON SIDE EFFECTS:  - Mild abdominal pain, nausea, belching, bloating or excessive gas:  rest,   eat lightly and use a heating pad.  - Sore Throat: treat with throat lozenges and/or gargle with warm salt   water.  - Because air was used during the procedure, expelling large amounts of air   from your rectum or belching is normal.  - If a bowel prep was taken, you may not have a bowel movement for 1-3 days.    This is normal.  SYMPTOMS TO WATCH FOR AND REPORT TO YOUR PHYSICIAN:  1. Abdominal pain or bloating, other than gas cramps.  2. Chest pain.  3. Back pain.  4. Signs of infection such as: chills or fever occurring within 24 hours   after the procedure.  5. Rectal bleeding, which would show as bright red, maroon, or black stools.   (A tablespoon of blood from the rectum is not serious, especially if    hemorrhoids are present.)  6. Vomiting.  7. Weakness or dizziness.  GO DIRECTLY TO THE NEAREST EMERGENCY ROOM IF YOU HAVE ANY OF THE FOLLOWING:      Difficulty breathing              Chills and/or fever over 101 F   Persistent vomiting and/or vomiting blood   Severe abdominal pain   Severe chest pain   Black, tarry stools   Bleeding- more than one tablespoon   Any other symptom or condition that you feel may need urgent attention  Your doctor recommends these additional instructions:  If any biopsies were taken, your doctors clinic will contact you in 1 to 2   weeks with any results.  - Will refer to an esophageal motility specialist.   - Full liquid diet.   - Continue present medications.   For questions, problems or results please call your physician - Vaughn Ramsey MD at Work:  [phone].  [facility], EMERGENCY ROOM PHONE NUMBER: [phone]  IF A COMPLICATION OR EMERGENCY SITUATION ARISES AND YOU ARE UNABLE TO REACH   YOUR PHYSICIAN - GO DIRECTLY TO THE EMERGENCY ROOM.  Vaguhn Ramsey MD  8/13/2024 5:19:15 PM  This report has been verified and signed electronically.  Dear patient,  As a result of recent federal legislation (The Federal Cures Act), you may   receive lab or pathology results from your procedure in your [facility]   account before your physician is able to contact you. Your physician or   their representative will relay the results to you with their   recommendations at their soonest availability.  Thank you,  PROVATION

## 2024-08-13 NOTE — PROGRESS NOTES
[facility]  Gastroenterology  Progress Note    Patient Name: Sukumar Wiseman  MRN: [id number]  Admission Date: 8/10/2024  Hospital Length of Stay: 3 days  Code Status: Full Code   Attending Provider: Ishaan Calix DO  Consulting Provider: SONIYA CauseyP-C  Primary Care Physician: Larry Nicholas Jr., MD  Principal Problem: Esophageal stricture        Subjective:     Interval History: s/p EGD with balloon dilation to 20 mm. Patient sitting up eating with no complications, denies any cough or foods getting stuck. Likely discharge home today.       Objective:     Vital Signs (Most Recent):  Temp: 97.9 °F (36.6 °C) (08/13/24 0733)  Pulse: 83 (08/13/24 0733)  Resp: 18 (08/13/24 0747)  BP: 119/85 (08/13/24 0733)  SpO2: (!) 94 % (08/13/24 0821) Vital Signs (24h Range):  Temp:  [97.9 °F (36.6 °C)-98.7 °F (37.1 °C)] 97.9 °F (36.6 °C)  Pulse:  [66-83] 83  Resp:  [12-20] 18  SpO2:  [92 %-97 %] 94 %  BP: (115-131)/(73-91) 119/85     Weight: 91.6 kg (202 lb) (08/12/24 0806)  Body mass index is 28.98 kg/m².      Intake/Output Summary (Last 24 hours) at 8/13/2024 0859  Last data filed at 8/12/2024 0910  Gross per 24 hour   Intake 100 ml   Output --   Net 100 ml       Lines/Drains/Airways       Peripheral Intravenous Line  Duration                  Peripheral IV - Single Lumen 08/12/24 1032 20 G Anterior;Right Upper Arm <1 day                     Physical Exam  Vitals and nursing note reviewed.   Constitutional:       General: He is awake. He is not in acute distress.     Appearance: Normal appearance. He is well-developed and overweight. He is not ill-appearing.   HENT:      Head: Normocephalic and atraumatic.      Right Ear: External ear normal.      Left Ear: External ear normal.      Nose: Nose normal.   Eyes:      General: No scleral icterus.     Extraocular Movements: Extraocular movements intact.   Cardiovascular:      Rate and Rhythm: Normal rate and regular rhythm.      Heart sounds: Normal heart sounds. No  murmur heard.     No gallop.   Pulmonary:      Effort: Pulmonary effort is normal. No respiratory distress.      Breath sounds: No rales.   Abdominal:      General: There is no distension.      Tenderness: There is no guarding.   Musculoskeletal:         General: Normal range of motion.      Cervical back: Normal range of motion and neck supple.      Left lower leg: No edema.   Skin:     General: Skin is warm and dry.   Neurological:      Mental Status: He is alert and oriented to person, place, and time.   Psychiatric:         Mood and Affect: Mood normal.         Behavior: Behavior is cooperative.         Thought Content: Thought content normal.          Significant Labs:  CBC:   Recent Labs   Lab 08/12/24 0230 08/13/24 0235   WBC 4.78 4.74   HGB 12.8* 12.4*   HCT 39.5* 37.9*    171     BMP:   Recent Labs   Lab 08/13/24 0235         K 3.5      CO2 25   BUN 10   CREATININE 0.8   CALCIUM 9.1   MG 1.8     CMP:   Recent Labs   Lab 08/13/24 0235      CALCIUM 9.1   ALBUMIN 3.0*   PROT 6.4      K 3.5   CO2 25      BUN 10   CREATININE 0.8   ALKPHOS 47*   ALT 30   AST 14   BILITOT 0.6     All pertinent lab results from the last 24 hours have been reviewed.      Significant Imaging:  Imaging results within the past 24 hours have been reviewed.  Assessment/Plan:     GI  Esophageal dysphagia  Sukumar Wiseman is a 38 y.o. male with history of progressive dysphagia secondary to esophageal stricture, cerebellar glioma currently on lomustine (tx at [facility]), who was admitted for aspiration pneumonia and SVT after EGD for dilation was aborted due to aspiration. Cardiology was consulted and he required adenosine to convert to sinus rhythm. He is on IV abx for pneumonia. GI consulted as patient was transferred from [facility] to [facility] for GI eval of esophageal stricture. 8/13 EGD with balloon dilation to 20 mm and esophageal manometry.        - Esophageal plaques were found,  consistent with                          candidiasis.      - The esophageal examination was consistent with                          achalasia. Dilated.     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                          - appointment request sent to Dr. Burgos for [facility] f/u                          - Diflucan (fluconazole) 400 mg PO daily X 1 dose                          today, Diflucan (fluconazole) 200 mg PO daily for 13                          days starting tomorrow.                          - recommend full liquid diet                          - rec liquid medications if possible                          - sit up 90 degree angle with meals & meds, eat slowly            After careful discussion with Dr. Ramsey , it has been decided for Gastroenterology to sign off on this patient. Please contact for any questions or concerns.    Thank you for your consult. I will sign off. Please contact us if you have any additional questions.    Kirsten Chilel, JAIRO-C  Gastroenterology  [facility]

## 2024-08-13 NOTE — NURSING
Provided patient teaching discharge with patient and wife. Reviewed medication and follow up appointment confirmed pharmacy where medications will be sent tele removed iv removed

## 2024-08-13 NOTE — NURSING
Was waiting for meds decided to  at pharmacy will take diflucan once they reach pharmacy leaving now. MD espinal aware. No further questions for discharge. Patient left with wife to go home,

## 2024-08-13 NOTE — SUBJECTIVE & OBJECTIVE
Subjective:     Interval History: s/p EGD with balloon dilation to 20 mm. Patient sitting up eating with no complications, denies any cough or foods getting stuck. Likely discharge home today.       Objective:     Vital Signs (Most Recent):  Temp: 97.9 °F (36.6 °C) (08/13/24 0733)  Pulse: 83 (08/13/24 0733)  Resp: 18 (08/13/24 0747)  BP: 119/85 (08/13/24 0733)  SpO2: (!) 94 % (08/13/24 0821) Vital Signs (24h Range):  Temp:  [97.9 °F (36.6 °C)-98.7 °F (37.1 °C)] 97.9 °F (36.6 °C)  Pulse:  [66-83] 83  Resp:  [12-20] 18  SpO2:  [92 %-97 %] 94 %  BP: (115-131)/(73-91) 119/85     Weight: 91.6 kg (202 lb) (08/12/24 0806)  Body mass index is 28.98 kg/m².      Intake/Output Summary (Last 24 hours) at 8/13/2024 0859  Last data filed at 8/12/2024 0910  Gross per 24 hour   Intake 100 ml   Output --   Net 100 ml       Lines/Drains/Airways       Peripheral Intravenous Line  Duration                  Peripheral IV - Single Lumen 08/12/24 1032 20 G Anterior;Right Upper Arm <1 day                     Physical Exam  Vitals and nursing note reviewed.   Constitutional:       General: He is awake. He is not in acute distress.     Appearance: Normal appearance. He is well-developed and overweight. He is not ill-appearing.   HENT:      Head: Normocephalic and atraumatic.      Right Ear: External ear normal.      Left Ear: External ear normal.      Nose: Nose normal.   Eyes:      General: No scleral icterus.     Extraocular Movements: Extraocular movements intact.   Cardiovascular:      Rate and Rhythm: Normal rate and regular rhythm.      Heart sounds: Normal heart sounds. No murmur heard.     No gallop.   Pulmonary:      Effort: Pulmonary effort is normal. No respiratory distress.      Breath sounds: No rales.   Abdominal:      General: There is no distension.      Tenderness: There is no guarding.   Musculoskeletal:         General: Normal range of motion.      Cervical back: Normal range of motion and neck supple.      Left lower  leg: No edema.   Skin:     General: Skin is warm and dry.   Neurological:      Mental Status: He is alert and oriented to person, place, and time.   Psychiatric:         Mood and Affect: Mood normal.         Behavior: Behavior is cooperative.         Thought Content: Thought content normal.          Significant Labs:  CBC:   Recent Labs   Lab 08/12/24 0230 08/13/24 0235   WBC 4.78 4.74   HGB 12.8* 12.4*   HCT 39.5* 37.9*    171     BMP:   Recent Labs   Lab 08/13/24 0235         K 3.5      CO2 25   BUN 10   CREATININE 0.8   CALCIUM 9.1   MG 1.8     CMP:   Recent Labs   Lab 08/13/24 0235      CALCIUM 9.1   ALBUMIN 3.0*   PROT 6.4      K 3.5   CO2 25      BUN 10   CREATININE 0.8   ALKPHOS 47*   ALT 30   AST 14   BILITOT 0.6     All pertinent lab results from the last 24 hours have been reviewed.      Significant Imaging:  Imaging results within the past 24 hours have been reviewed.

## 2024-08-13 NOTE — PLAN OF CARE
[facility]  Discharge Final Note    Primary Care Provider: Larry Nicholas Jr., MD    Expected Discharge Date: 8/13/2024    Final Discharge Note (most recent)       Final Note - 08/13/24 0922          Final Note    Assessment Type Final Discharge Note     Anticipated Discharge Disposition Home or Self Care     Hospital Resources/Appts/Education Provided Provided patient/caregiver with written discharge plan information        Post-Acute Status    Patient choice form signed by patient/caregiver List with quality metrics by geographic area provided     Discharge Delays None known at this time                     Important Message from Medicare             Pt d/c home with family. No d/c needs reported by medical team at this time.     Sraah Paulino LCSW  Case Management/[facility]  [phone]

## 2024-08-13 NOTE — DISCHARGE SUMMARY
[facility] Medicine  Discharge Summary      Patient Name: Sukumar Wiseman  MRN: [id number]  ROSALBA: [id number]  Patient Class: IP- Inpatient  Admission Date: 8/10/2024  Hospital Length of Stay: 3 days  Discharge Date and Time:  08/13/2024 10:43 AM  Attending Physician: Ishaan Calix DO   Discharging Provider: Ishaan Calix DO  Primary Care Provider: Larry Nicholas Jr., MD  [facility] Medicine Team: [facility] MED R Ishaan Calix DO  Primary Care Team: [facility] MED R    HPI:   This is a 38 year old gentleman with a history of 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    Per sign out: 'Procedure was aborted due to large food burden in proximal esophagus with vomiting, and concern for possible aspiration. Patient required placement on O2 via NC, with BP stable and remained afebrile. CXR showed retrocardiac airspace opacity consistent with pneumonia with mild opacity also persisting in the left perihilar region. Patient started on zosyn. Patient's course is further c/b SVT. Cardiology was consulted and notes that this morning patient remained with sustained HR 170s after 5 mg IV Lopressor, 2.5 mg IV Lopressor, and 500 cc bolus NS. BP became hypotensive and pt's hemodynamics slowly became unstable. Pt was given 6 mg Adenosine IV push with flush. Crash cart at beside with pads attached. Post adenosine, HR improved to SR 90s. Pt stable, tolerated well. No immediate complications. Patient remains on PO metoprolol, with further recommendations from cardiology to follow. [facility] wishes to transfer for further GI eval, with patient NPO and HOB 30 degrees. If concern for airway protection, may need upgrade. Oncology will consult.'    Patient states that he has been dealing with dysphagia for a while.  Most notably 3 months ago he started experiencing retching and vomiting  after meals.  About 2 weeks ago, episodes occurred later at night.  Esophagram was done noting moderate dilation of the distal aspect of the esophagus, may indicate the presence of achalasia/longstanding stricture.  Currently denies any fevers, chills, nausea, vomiting, abdominal pain, chest pain, shortness of breath.  Denies issues with swallowing pills.    Procedure(s) (LRB):  EGD (ESOPHAGOGASTRODUODENOSCOPY) (N/A)      Hospital Course:   Admitted as transfer from [facility] due to aborted EGD that was done for stricture dilation.  GI consulted, planning for EGD Monday 8/12.  Patient requires dilation in order to continue with chemotherapy due to high-risk for infection.  Currently treating with IV Zosyn for aspiration pneumonia from previous hospitalization. EGD was done, noting significant dilation of the entire esophagus suspicious for achalasia.  Esophagus was dilated.  Biopsies were obtained.  Esophageal plaques were also found consistent candidiasis, started on fluconazole.  Esophageal manometry was then done, results pending     Physical Exam  Gen: in NAD, appears stated age  Neuro: AAOx3, motor, sensory, and strength grossly intact BL  HEENT: NTNC, EOMI, PERRL, MMM  CVS: RRR, no m/r/g; S1/S2 auscultated with no S3 or S4; capillary refill < 2 sec  Resp: lungs CTAB, no w/r/r; no belabored breathing or accessory muscle use appreciated   Abd: BS+ in all 4 quadrants; NTND, soft to palpation; no organomegaly appreciated   Extrem: pulses full, equal, and regular over all 4 extremities; no UE or LE edema BL    Goals of Care Treatment Preferences:  Code Status: Full Code         Consults:   Consults (From admission, onward)          Status Ordering Provider     Inpatient consult to Gastroenterology  Once        Provider:  (Not yet assigned)    Completed NIR HATFIELD            No new Assessment & Plan notes have been filed under this hospital service since the last note was generated.  Service: [facility]  Medicine    Final Active Diagnoses:    Diagnosis Date Noted POA    PRINCIPAL PROBLEM:  Esophageal stricture [K22.2] 08/07/2024 Yes    SVT (supraventricular tachycardia) [I47.10] 04/04/2024 Yes    Gastroesophageal reflux disease without esophagitis [K21.9] 04/04/2024 Yes    Esophageal dysphagia [R13.19] 04/04/2024 Yes    Diffuse midline glioma, H3 K27M mutant [C71.9] 02/01/2024 Yes    Cerebellar mass [G93.89] 01/04/2024 Yes    KIM (generalized anxiety disorder) [F41.1] 03/18/2021 Yes      Problems Resolved During this Admission:    Diagnosis Date Noted Date Resolved POA    Aspiration pneumonia [J69.0] 08/08/2024 08/13/2024 Yes    Leukocytosis [D72.829] 01/05/2024 08/13/2024 Yes       Discharged Condition: good    Disposition: Home or Self Care    Follow Up:   Follow-up Information       Larry Nicholas Jr., MD Follow up on 8/20/2024.    Specialty: Internal Medicine  Why: F/U appointment 11:15 am  Contact information:  [address] [place] [id number]  [phone]                           Patient Instructions:      Notify your health care provider if you experience any of the following:     Notify your health care provider if you experience any of the following:  increased confusion or weakness     Notify your health care provider if you experience any of the following:  persistent dizziness, light-headedness, or visual disturbances     Notify your health care provider if you experience any of the following:  worsening rash     Notify your health care provider if you experience any of the following:  severe persistent headache     Notify your health care provider if you experience any of the following:  difficulty breathing or increased cough     Notify your health care provider if you experience any of the following:  redness, tenderness, or signs of infection (pain, swelling, redness, odor or green/yellow discharge around incision site)     Notify your health care provider if you experience any of the  following:  severe uncontrolled pain     Notify your health care provider if you experience any of the following:  persistent nausea and vomiting or diarrhea     Notify your health care provider if you experience any of the following:  temperature >100.4       Significant Diagnostic Studies: N/A    Pending Diagnostic Studies:       Procedure Component Value Units Date/Time    Specimen to Pathology, Surgery Gastrointestinal tract [[id number]] Collected: 08/12/24 0859    Order Status: Sent Lab Status: In process Updated: 08/12/24 1026    Specimen: Tissue            Medications:  Reconciled Home Medications:      Medication List        START taking these medications      fluconazole 10 mg/mL suspension  Commonly known as: DIFLUCAN  Take 20 mLs (200 mg total) by mouth once daily. for 13 days  Start taking on: August 14, 2024     omeprazole (bulk) 100 % Powd  20 mg by Misc.(Non-Drug; Combo Route) route once daily.            CHANGE how you take these medications      dexAMETHasone 4 MG Tab  Commonly known as: DECADRON  --a single 2mg tablet every 6 hours for 3 days --a single 2mg tablet every 8 hours for 3 days --a single 2mg tablet every 12 hours for 4 days --a single 2mg tablet every morning for 4 days  What changed: additional instructions            CONTINUE taking these medications      acetaminophen 500 MG tablet  Commonly known as: TYLENOL  Take 500 mg by mouth every 4 (four) hours as needed.     EScitalopram oxalate 10 MG tablet  Commonly known as: LEXAPRO  Take 1 tablet (10 mg total) by mouth once daily.     GLEOSTINE 100 mg capsule  Generic drug: lomustine  Take 200 mg (2 capsules) by mouth at bedtime on day 1 of each 42 day cycle.     LORazepam 0.5 MG tablet  Commonly known as: ATIVAN  Take 0.5 mg by mouth daily as needed.     metoprolol tartrate 25 MG tablet  Commonly known as: LOPRESSOR  Take 25 mg by mouth 2 (two) times daily.     ondansetron 8 MG tablet  Commonly known as: ZOFRAN  Take 8 mg by mouth every  8 (eight) hours as needed.     prochlorperazine 10 MG tablet  Commonly known as: COMPAZINE  Take 10 mg by mouth once.     promethazine 12.5 MG Tab  Commonly known as: PHENERGAN  Take 12.5 mg by mouth every 4 (four) hours as needed.     traMADoL 50 mg tablet  Commonly known as: ULTRAM  Take 50 mg by mouth.            STOP taking these medications      pantoprazole 40 MG tablet  Commonly known as: PROTONIX              Indwelling Lines/Drains at time of discharge:   Lines/Drains/Airways       None                   Time spent on the discharge of patient: 35 minutes     Pt deemed appropriate for discharge. Plan discussed with pt, who was agreeable and amenable; medications were discussed and reviewed, outpatient follow-up scheduled, ER precautions were given, all questions were answered to the pt's satisfaction, and Mr Wiseman was subsequently discharged.      Ishaan Calix DO  Department of Hospital Medicine  [facility]

## 2024-08-13 NOTE — PLAN OF CARE
Problem: Adult Inpatient Plan of Care  Goal: Plan of Care Review  Outcome: Progressing  Flowsheets (Taken 8/13/2024 0856)  Plan of Care Reviewed With: patient  Goal: Patient-Specific Goal (Individualized)  Outcome: Progressing  Flowsheets (Taken 8/13/2024 0856)  Individualized Care Needs: antimetic  Anxieties, Fears or Concerns: pain nausea  Patient/Family-Specific Goals (Include Timeframe): discharge wife at bedside  Goal: Absence of Hospital-Acquired Illness or Injury  Outcome: Progressing  Intervention: Identify and Manage Fall Risk  Flowsheets (Taken 8/13/2024 0856)  Safety Promotion/Fall Prevention: assistive device/personal item within reach  Intervention: Prevent Skin Injury  Flowsheets (Taken 8/13/2024 0856)  Body Position: position changed independently  Skin Protection: incontinence pads utilized  Device Skin Pressure Protection: absorbent pad utilized/changed  Intervention: Prevent and Manage VTE (Venous Thromboembolism) Risk  Flowsheets (Taken 8/13/2024 0856)  VTE Prevention/Management: remove, assess skin, and reapply sequential compression device  Intervention: Prevent Infection  Flowsheets (Taken 8/13/2024 0856)  Infection Prevention:   cohorting utilized   environmental surveillance performed   equipment surfaces disinfected   personal protective equipment utilized   hand hygiene promoted   single patient room provided  Goal: Optimal Comfort and Wellbeing  Outcome: Progressing  Goal: Readiness for Transition of Care  Outcome: Progressing     Problem: Infection  Goal: Absence of Infection Signs and Symptoms  Outcome: Progressing     Problem: Fall Injury Risk  Goal: Absence of Fall and Fall-Related Injury  Outcome: Progressing     Problem: Skin Injury Risk Increased  Goal: Skin Health and Integrity  Outcome: Progressing

## 2024-08-14 ENCOUNTER — PATIENT OUTREACH (OUTPATIENT)
Dept: ADMINISTRATIVE | Facility: CLINIC | Age: 39
End: 2024-08-14
Payer: COMMERCIAL

## 2024-08-14 LAB
FINAL PATHOLOGIC DIAGNOSIS: NORMAL
GROSS: NORMAL
Lab: NORMAL
MICROSCOPIC EXAM: NORMAL

## 2024-08-15 ENCOUNTER — PATIENT MESSAGE (OUTPATIENT)
Dept: GASTROENTEROLOGY | Facility: CLINIC | Age: 39
End: 2024-08-15
Payer: COMMERCIAL

## 2024-08-21 ENCOUNTER — PATIENT MESSAGE (OUTPATIENT)
Dept: GASTROENTEROLOGY | Facility: CLINIC | Age: 39
End: 2024-08-21
Payer: COMMERCIAL

## 2024-08-26 ENCOUNTER — TELEPHONE (OUTPATIENT)
Dept: GASTROENTEROLOGY | Facility: CLINIC | Age: 39
End: 2024-08-26
Payer: COMMERCIAL

## 2024-08-26 NOTE — TELEPHONE ENCOUNTER
----- Message from Savanna Grimaldo sent at 8/26/2024  3:42 PM CDT -----  Regarding: Schedule Post-Op  Contact: Pt's Wife @[phone]  Pt's Wife  is calling to speak to someone in the office to schedule a follow up / postop appt. No available appts for postop in Epic. Please call. Thanks.         What date was pt's procedure done? 8-

## 2024-09-03 NOTE — OP NOTE
[facility] - OR Periop Services  General Surgery Department  Operative Note    SUMMARY     Date of Procedure: 8/7/2024     Procedure: Procedure(s) (LRB):  EGD (ESOPHAGOGASTRODUODENOSCOPY):      Surgeon(s) and Role:  Gail Chandler MD     Pre-Operative Diagnosis: Pre-Op Diagnosis Codes:     * GERD (gastroesophageal reflux disease) [K21.9]     * Dysphagia [R13.10]    Post-Operative Diagnosis: Same         Anesthesia: Local MAC    Findings:  Large food burden in proximal esophagus with vomiting  Procedure aborted due to aspiration risk    Indications for the Procedure:  37 yo male who presents with esophageal stricture.     Operative Conduct in Detail:     Risks, benefits, and alternatives were thoroughly discussed with the patient. Despite the risks, the patient wished to proceed. Informed consent was signed and will be scanned to the electronic chart.     The patient was placed on left lateral decubitus, with mouth block protection. After achieving moderate sedation, a standard gastroscope was introduced through the mouth down the esophagus where we encountered a large food burden with subsequent patient vomiting.  Procedure aborted due to aspiration risk we will obtain chest x-ray in recovery.    Specimens :  Specimens (From admission, onward)      None              Diagnostic Impression:  Procedure aborted.  We will re-attempt EGD with dilation on 08/14  Strict compliance to clear liquid diet once again emphasized the patient       Complications: No    Estimated Blood Loss (EBL): Minimal           Implants: None             Condition: Stable    Disposition: PACU - hemodynamically stable.    Gail Chandler MD  General Surgery   [phone]          
Detail Level: Zone
Detail Level: Simple

## 2024-09-05 ENCOUNTER — LAB VISIT (OUTPATIENT)
Dept: LAB | Facility: HOSPITAL | Age: 39
End: 2024-09-05
Attending: PSYCHIATRY & NEUROLOGY
Payer: COMMERCIAL

## 2024-09-05 DIAGNOSIS — C71.9 DIFFUSE MIDLINE GLIOMA, H3 K27M MUTANT: ICD-10-CM

## 2024-09-05 DIAGNOSIS — C71.9 DIFFUSE MIDLINE GLIOMA, H3 K27M MUTANT: Primary | ICD-10-CM

## 2024-09-05 LAB
ALBUMIN SERPL BCP-MCNC: 3.6 G/DL (ref 3.5–5.2)
ALP SERPL-CCNC: 67 U/L (ref 55–135)
ALT SERPL W/O P-5'-P-CCNC: 57 U/L (ref 10–44)
ANION GAP SERPL CALC-SCNC: 7 MMOL/L (ref 3–11)
AST SERPL-CCNC: 20 U/L (ref 10–40)
BASOPHILS # BLD AUTO: 0.02 K/UL (ref 0–0.2)
BASOPHILS NFR BLD: 0.4 % (ref 0–1.9)
BILIRUB SERPL-MCNC: 0.5 MG/DL (ref 0.1–1)
BUN SERPL-MCNC: 12 MG/DL (ref 6–20)
CALCIUM SERPL-MCNC: 8.8 MG/DL (ref 8.7–10.5)
CHLORIDE SERPL-SCNC: 101 MMOL/L (ref 95–110)
CO2 SERPL-SCNC: 30 MMOL/L (ref 23–29)
CREAT SERPL-MCNC: 0.9 MG/DL (ref 0.5–1.4)
DIFFERENTIAL METHOD BLD: ABNORMAL
EOSINOPHIL # BLD AUTO: 0 K/UL (ref 0–0.5)
EOSINOPHIL NFR BLD: 0.2 % (ref 0–8)
ERYTHROCYTE [DISTWIDTH] IN BLOOD BY AUTOMATED COUNT: 13.7 % (ref 11.5–14.5)
EST. GFR  (NO RACE VARIABLE): >60 ML/MIN/1.73 M^2
GLUCOSE SERPL-MCNC: 93 MG/DL (ref 70–110)
HCT VFR BLD AUTO: 40.5 % (ref 40–54)
HGB BLD-MCNC: 13.6 G/DL (ref 14–18)
IMM GRANULOCYTES # BLD AUTO: 0.09 K/UL (ref 0–0.04)
IMM GRANULOCYTES NFR BLD AUTO: 1.6 % (ref 0–0.5)
LYMPHOCYTES # BLD AUTO: 1 K/UL (ref 1–4.8)
LYMPHOCYTES NFR BLD: 18 % (ref 18–48)
MCH RBC QN AUTO: 32 PG (ref 27–31)
MCHC RBC AUTO-ENTMCNC: 33.6 G/DL (ref 32–36)
MCV RBC AUTO: 95 FL (ref 82–98)
MONOCYTES # BLD AUTO: 0.5 K/UL (ref 0.3–1)
MONOCYTES NFR BLD: 9.5 % (ref 4–15)
NEUTROPHILS # BLD AUTO: 4 K/UL (ref 1.8–7.7)
NEUTROPHILS NFR BLD: 70.3 % (ref 38–73)
NRBC BLD-RTO: 0 /100 WBC
PLATELET # BLD AUTO: 208 K/UL (ref 150–450)
PMV BLD AUTO: 8.1 FL (ref 9.2–12.9)
POTASSIUM SERPL-SCNC: 3.9 MMOL/L (ref 3.5–5.1)
PROT SERPL-MCNC: 7.4 G/DL (ref 6–8.4)
RBC # BLD AUTO: 4.25 M/UL (ref 4.6–6.2)
SODIUM SERPL-SCNC: 138 MMOL/L (ref 136–145)
WBC # BLD AUTO: 5.67 K/UL (ref 3.9–12.7)

## 2024-09-05 PROCEDURE — 80053 COMPREHEN METABOLIC PANEL: CPT | Performed by: PSYCHIATRY & NEUROLOGY

## 2024-09-05 PROCEDURE — 85025 COMPLETE CBC W/AUTO DIFF WBC: CPT | Performed by: PSYCHIATRY & NEUROLOGY

## 2024-09-05 PROCEDURE — 36415 COLL VENOUS BLD VENIPUNCTURE: CPT | Performed by: PSYCHIATRY & NEUROLOGY

## 2024-09-06 NOTE — HOSPITAL COURSE
EGD with dilation was attempted on 08/07, but was terminated due to patient aspiration.  Patient admitted to the floor where IV antibiotics were initiated and patient was kept NPO.  Patient was later transferred on 08/11 to a higher level of care for evaluation by Gastroenterology for repeat EGD and dilation.

## 2024-09-06 NOTE — DISCHARGE SUMMARY
[facility]  General Surgery  Discharge Summary      Patient Name: Sukumar Wiseman  MRN: [id number]  Admission Date: 8/7/2024  Hospital Length of Stay: 2 days  Discharge Date and Time: 8/10/2024  2:58 AM  Attending Physician: No att. providers found   Discharging Provider: Gail Chandler MD  Primary Care Provider: Larry Nicholas Jr., MD    HPI:   No notes on file    Procedure(s) (LRB):  EGD (ESOPHAGOGASTRODUODENOSCOPY) (N/A)      Indwelling Lines/Drains at time of discharge:   Lines/Drains/Airways       None                 Hospital Course: EGD with dilation was attempted on 08/07, but was terminated due to patient aspiration.  Patient admitted to the floor where IV antibiotics were initiated and patient was kept NPO.  Patient was later transferred on 08/11 to a higher level of care for evaluation by Gastroenterology for repeat EGD and dilation.    Goals of Care Treatment Preferences:  Code Status: Full Code      Consults:   Consults (From admission, onward)          Status Ordering Provider     Inpatient consult to Cardiology  Once        Provider:  Jasen Espinal MD    Completed LARRY NICHOLAS JR            Significant Diagnostic Studies:  See above    Pending Diagnostic Studies:       None          Final Active Diagnoses:    Diagnosis Date Noted POA    Esophageal stricture [K22.2] 08/07/2024 Yes    SVT (supraventricular tachycardia) [I47.10] 04/04/2024 Yes    Cerebellar mass [G93.89] 01/04/2024 Yes    KIM (generalized anxiety disorder) [F41.1] 03/18/2021 Yes      Problems Resolved During this Admission:    Diagnosis Date Noted Date Resolved POA    PRINCIPAL PROBLEM:  Aspiration pneumonia [J69.0] 08/08/2024 08/13/2024 Unknown      Discharged Condition: stable    Disposition: Discharged to Other Facility    Follow Up:    Patient Instructions:   No discharge procedures on file.  Medications:  Reconciled Home Medications:      Medication List        ASK your doctor about these medications       acetaminophen 500 MG tablet  Commonly known as: TYLENOL  Take 500 mg by mouth every 4 (four) hours as needed.     dexAMETHasone 4 MG Tab  Commonly known as: DECADRON  --a single 2mg tablet every 6 hours for 3 days --a single 2mg tablet every 8 hours for 3 days --a single 2mg tablet every 12 hours for 4 days --a single 2mg tablet every morning for 4 days     EScitalopram oxalate 10 MG tablet  Commonly known as: LEXAPRO  Take 1 tablet (10 mg total) by mouth once daily.     LORazepam 0.5 MG tablet  Commonly known as: ATIVAN  Take 0.5 mg by mouth daily as needed.     ondansetron 8 MG tablet  Commonly known as: ZOFRAN  Take 8 mg by mouth every 8 (eight) hours as needed.     prochlorperazine 10 MG tablet  Commonly known as: COMPAZINE  Take 10 mg by mouth once.     promethazine 12.5 MG Tab  Commonly known as: PHENERGAN  Take 12.5 mg by mouth every 4 (four) hours as needed.     traMADoL 50 mg tablet  Commonly known as: ULTRAM  Take 50 mg by mouth.            Time spent on the discharge of patient: 5 minutes    Gail Chandler MD  General Surgery  [facility]

## 2024-09-12 ENCOUNTER — LAB VISIT (OUTPATIENT)
Dept: LAB | Facility: HOSPITAL | Age: 39
End: 2024-09-12
Attending: PHYSICIAN ASSISTANT
Payer: COMMERCIAL

## 2024-09-12 DIAGNOSIS — C71.9 DIFFUSE MIDLINE GLIOMA, H3 K27M MUTANT: ICD-10-CM

## 2024-09-12 DIAGNOSIS — C71.0 MALIGNANT NEOPLASM OF CEREBRUM, EXCEPT LOBES AND VENTRICLES: Primary | ICD-10-CM

## 2024-09-12 LAB
ALBUMIN SERPL BCP-MCNC: 3.7 G/DL (ref 3.5–5.2)
ALP SERPL-CCNC: 64 U/L (ref 55–135)
ALT SERPL W/O P-5'-P-CCNC: 80 U/L (ref 10–44)
ANION GAP SERPL CALC-SCNC: 8 MMOL/L (ref 3–11)
AST SERPL-CCNC: 25 U/L (ref 10–40)
BASOPHILS # BLD AUTO: 0 K/UL (ref 0–0.2)
BASOPHILS NFR BLD: 0 % (ref 0–1.9)
BILIRUB SERPL-MCNC: 0.6 MG/DL (ref 0.1–1)
BUN SERPL-MCNC: 11 MG/DL (ref 6–20)
CALCIUM SERPL-MCNC: 9.1 MG/DL (ref 8.7–10.5)
CHLORIDE SERPL-SCNC: 103 MMOL/L (ref 95–110)
CO2 SERPL-SCNC: 29 MMOL/L (ref 23–29)
CREAT SERPL-MCNC: 1 MG/DL (ref 0.5–1.4)
DIFFERENTIAL METHOD BLD: ABNORMAL
EOSINOPHIL # BLD AUTO: 0 K/UL (ref 0–0.5)
EOSINOPHIL NFR BLD: 0 % (ref 0–8)
ERYTHROCYTE [DISTWIDTH] IN BLOOD BY AUTOMATED COUNT: 14.1 % (ref 11.5–14.5)
EST. GFR  (NO RACE VARIABLE): >60 ML/MIN/1.73 M^2
GLUCOSE SERPL-MCNC: 98 MG/DL (ref 70–110)
HCT VFR BLD AUTO: 38.7 % (ref 40–54)
HGB BLD-MCNC: 13.3 G/DL (ref 14–18)
IMM GRANULOCYTES # BLD AUTO: 0.04 K/UL (ref 0–0.04)
IMM GRANULOCYTES NFR BLD AUTO: 0.7 % (ref 0–0.5)
LYMPHOCYTES # BLD AUTO: 0.8 K/UL (ref 1–4.8)
LYMPHOCYTES NFR BLD: 14.9 % (ref 18–48)
MCH RBC QN AUTO: 32.6 PG (ref 27–31)
MCHC RBC AUTO-ENTMCNC: 34.4 G/DL (ref 32–36)
MCV RBC AUTO: 95 FL (ref 82–98)
MONOCYTES # BLD AUTO: 0.7 K/UL (ref 0.3–1)
MONOCYTES NFR BLD: 12.1 % (ref 4–15)
NEUTROPHILS # BLD AUTO: 4 K/UL (ref 1.8–7.7)
NEUTROPHILS NFR BLD: 72.3 % (ref 38–73)
NRBC BLD-RTO: 0 /100 WBC
PLATELET # BLD AUTO: 217 K/UL (ref 150–450)
PMV BLD AUTO: 8.2 FL (ref 9.2–12.9)
POTASSIUM SERPL-SCNC: 4.2 MMOL/L (ref 3.5–5.1)
PROT SERPL-MCNC: 7.3 G/DL (ref 6–8.4)
RBC # BLD AUTO: 4.08 M/UL (ref 4.6–6.2)
SODIUM SERPL-SCNC: 140 MMOL/L (ref 136–145)
WBC # BLD AUTO: 5.52 K/UL (ref 3.9–12.7)

## 2024-09-12 PROCEDURE — 85025 COMPLETE CBC W/AUTO DIFF WBC: CPT | Performed by: PHYSICIAN ASSISTANT

## 2024-09-12 PROCEDURE — 80053 COMPREHEN METABOLIC PANEL: CPT | Performed by: PHYSICIAN ASSISTANT

## 2024-09-12 PROCEDURE — 36415 COLL VENOUS BLD VENIPUNCTURE: CPT | Performed by: PHYSICIAN ASSISTANT

## 2024-09-19 ENCOUNTER — LAB VISIT (OUTPATIENT)
Dept: LAB | Facility: HOSPITAL | Age: 39
End: 2024-09-19
Attending: PHYSICIAN ASSISTANT
Payer: COMMERCIAL

## 2024-09-19 DIAGNOSIS — C71.9 DIFFUSE MIDLINE GLIOMA, H3 K27M MUTANT: ICD-10-CM

## 2024-09-19 LAB
ALBUMIN SERPL BCP-MCNC: 3.6 G/DL (ref 3.5–5.2)
ALP SERPL-CCNC: 62 U/L (ref 55–135)
ALT SERPL W/O P-5'-P-CCNC: 48 U/L (ref 10–44)
ANION GAP SERPL CALC-SCNC: 10 MMOL/L (ref 3–11)
AST SERPL-CCNC: 14 U/L (ref 10–40)
BASOPHILS # BLD AUTO: 0.01 K/UL (ref 0–0.2)
BASOPHILS NFR BLD: 0.2 % (ref 0–1.9)
BILIRUB SERPL-MCNC: 0.7 MG/DL (ref 0.1–1)
BUN SERPL-MCNC: 8 MG/DL (ref 6–20)
CALCIUM SERPL-MCNC: 8.8 MG/DL (ref 8.7–10.5)
CHLORIDE SERPL-SCNC: 99 MMOL/L (ref 95–110)
CO2 SERPL-SCNC: 29 MMOL/L (ref 23–29)
CREAT SERPL-MCNC: 1.1 MG/DL (ref 0.5–1.4)
DIFFERENTIAL METHOD BLD: ABNORMAL
EOSINOPHIL # BLD AUTO: 0 K/UL (ref 0–0.5)
EOSINOPHIL NFR BLD: 0.2 % (ref 0–8)
ERYTHROCYTE [DISTWIDTH] IN BLOOD BY AUTOMATED COUNT: 13.9 % (ref 11.5–14.5)
EST. GFR  (NO RACE VARIABLE): >60 ML/MIN/1.73 M^2
GLUCOSE SERPL-MCNC: 121 MG/DL (ref 70–110)
HCT VFR BLD AUTO: 39 % (ref 40–54)
HGB BLD-MCNC: 13.2 G/DL (ref 14–18)
IMM GRANULOCYTES # BLD AUTO: 0.05 K/UL (ref 0–0.04)
IMM GRANULOCYTES NFR BLD AUTO: 0.9 % (ref 0–0.5)
LYMPHOCYTES # BLD AUTO: 0.9 K/UL (ref 1–4.8)
LYMPHOCYTES NFR BLD: 14.8 % (ref 18–48)
MCH RBC QN AUTO: 32.4 PG (ref 27–31)
MCHC RBC AUTO-ENTMCNC: 33.8 G/DL (ref 32–36)
MCV RBC AUTO: 96 FL (ref 82–98)
MONOCYTES # BLD AUTO: 0.6 K/UL (ref 0.3–1)
MONOCYTES NFR BLD: 9.9 % (ref 4–15)
NEUTROPHILS # BLD AUTO: 4.3 K/UL (ref 1.8–7.7)
NEUTROPHILS NFR BLD: 74 % (ref 38–73)
NRBC BLD-RTO: 0 /100 WBC
PLATELET # BLD AUTO: 134 K/UL (ref 150–450)
PMV BLD AUTO: 8 FL (ref 9.2–12.9)
POTASSIUM SERPL-SCNC: 3.8 MMOL/L (ref 3.5–5.1)
PROT SERPL-MCNC: 7.2 G/DL (ref 6–8.4)
RBC # BLD AUTO: 4.08 M/UL (ref 4.6–6.2)
SODIUM SERPL-SCNC: 138 MMOL/L (ref 136–145)
WBC # BLD AUTO: 5.74 K/UL (ref 3.9–12.7)

## 2024-09-19 PROCEDURE — 36415 COLL VENOUS BLD VENIPUNCTURE: CPT | Performed by: PHYSICIAN ASSISTANT

## 2024-09-19 PROCEDURE — 85025 COMPLETE CBC W/AUTO DIFF WBC: CPT | Performed by: PHYSICIAN ASSISTANT

## 2024-09-19 PROCEDURE — 80053 COMPREHEN METABOLIC PANEL: CPT | Performed by: PHYSICIAN ASSISTANT

## 2024-09-23 ENCOUNTER — LAB VISIT (OUTPATIENT)
Dept: LAB | Facility: HOSPITAL | Age: 39
End: 2024-09-23
Attending: PSYCHIATRY & NEUROLOGY
Payer: COMMERCIAL

## 2024-09-23 DIAGNOSIS — C71.9 DIFFUSE MIDLINE GLIOMA, H3 K27M MUTANT: ICD-10-CM

## 2024-09-23 LAB
ALBUMIN SERPL BCP-MCNC: 3.8 G/DL (ref 3.5–5.2)
ALP SERPL-CCNC: 69 U/L (ref 55–135)
ALT SERPL W/O P-5'-P-CCNC: 44 U/L (ref 10–44)
ANION GAP SERPL CALC-SCNC: 10 MMOL/L (ref 3–11)
AST SERPL-CCNC: 15 U/L (ref 10–40)
BASOPHILS # BLD AUTO: 0.02 K/UL (ref 0–0.2)
BASOPHILS NFR BLD: 0.3 % (ref 0–1.9)
BILIRUB SERPL-MCNC: 0.8 MG/DL (ref 0.1–1)
BUN SERPL-MCNC: 11 MG/DL (ref 6–20)
CALCIUM SERPL-MCNC: 9.2 MG/DL (ref 8.7–10.5)
CHLORIDE SERPL-SCNC: 99 MMOL/L (ref 95–110)
CO2 SERPL-SCNC: 28 MMOL/L (ref 23–29)
CREAT SERPL-MCNC: 1.2 MG/DL (ref 0.5–1.4)
DIFFERENTIAL METHOD BLD: ABNORMAL
EOSINOPHIL # BLD AUTO: 0 K/UL (ref 0–0.5)
EOSINOPHIL NFR BLD: 0 % (ref 0–8)
ERYTHROCYTE [DISTWIDTH] IN BLOOD BY AUTOMATED COUNT: 13.9 % (ref 11.5–14.5)
EST. GFR  (NO RACE VARIABLE): >60 ML/MIN/1.73 M^2
GLUCOSE SERPL-MCNC: 157 MG/DL (ref 70–110)
HCT VFR BLD AUTO: 40.2 % (ref 40–54)
HGB BLD-MCNC: 13.7 G/DL (ref 14–18)
IMM GRANULOCYTES # BLD AUTO: 0.03 K/UL (ref 0–0.04)
IMM GRANULOCYTES NFR BLD AUTO: 0.4 % (ref 0–0.5)
LYMPHOCYTES # BLD AUTO: 0.9 K/UL (ref 1–4.8)
LYMPHOCYTES NFR BLD: 12.4 % (ref 18–48)
MCH RBC QN AUTO: 32.2 PG (ref 27–31)
MCHC RBC AUTO-ENTMCNC: 34.1 G/DL (ref 32–36)
MCV RBC AUTO: 94 FL (ref 82–98)
MONOCYTES # BLD AUTO: 0.5 K/UL (ref 0.3–1)
MONOCYTES NFR BLD: 6.9 % (ref 4–15)
NEUTROPHILS # BLD AUTO: 5.8 K/UL (ref 1.8–7.7)
NEUTROPHILS NFR BLD: 80 % (ref 38–73)
NRBC BLD-RTO: 0 /100 WBC
PLATELET # BLD AUTO: 128 K/UL (ref 150–450)
PMV BLD AUTO: 8.1 FL (ref 9.2–12.9)
POTASSIUM SERPL-SCNC: 4 MMOL/L (ref 3.5–5.1)
PROT SERPL-MCNC: 7.6 G/DL (ref 6–8.4)
RBC # BLD AUTO: 4.26 M/UL (ref 4.6–6.2)
SODIUM SERPL-SCNC: 137 MMOL/L (ref 136–145)
WBC # BLD AUTO: 7.28 K/UL (ref 3.9–12.7)

## 2024-09-23 PROCEDURE — 36415 COLL VENOUS BLD VENIPUNCTURE: CPT | Performed by: PSYCHIATRY & NEUROLOGY

## 2024-09-23 PROCEDURE — 85025 COMPLETE CBC W/AUTO DIFF WBC: CPT | Performed by: PSYCHIATRY & NEUROLOGY

## 2024-09-23 PROCEDURE — 80053 COMPREHEN METABOLIC PANEL: CPT | Performed by: PSYCHIATRY & NEUROLOGY

## 2024-09-24 ENCOUNTER — PATIENT MESSAGE (OUTPATIENT)
Dept: NEUROLOGY | Facility: CLINIC | Age: 39
End: 2024-09-24
Payer: COMMERCIAL

## 2024-09-26 ENCOUNTER — TELEPHONE (OUTPATIENT)
Dept: GASTROENTEROLOGY | Facility: CLINIC | Age: 39
End: 2024-09-26
Payer: COMMERCIAL

## 2024-09-26 NOTE — TELEPHONE ENCOUNTER
Message routed to Jing to contact the patient wife regarding message below.   ----- Message from Marysol Keene sent at 9/26/2024  9:43 AM CDT -----  Regarding: pt advice  Contact: pt @ [phone]  Kiya /wife requesting a call back to discuss pt plan of care for surgery that was done on 8/20 for RX and Appt for pt please call to discuss  States pt is in pain and is out of town to please call please call pt @ [phone]

## 2024-09-26 NOTE — TELEPHONE ENCOUNTER
MA spoke with patients wife, she is requesting a prescription for Diflucan for patient, states that patient is having some esophageal pain, which is the same pain he had previously when he was diagnosed with Candida. Explained to patient that she will need to either contact patients GI provider or PCP for the prescription, where as Dr. Burgos has not seen him yet. Patient then requested a sooner appointment with Dr. Burgos, let her know that the appointment patient is currently scheduled for is the soonest appointment. She verbalized understanding.   Patient is scheduled for a virtual appointment on 10/9/24

## 2024-09-26 NOTE — TELEPHONE ENCOUNTER
----- Message from Talia Gamboa sent at 9/26/2024  9:18 AM CDT -----  .Type: Patient Call Back    Who called: Brandonie - wife     What is the request in detail: Stated  is having esophogio pain. Need to speak with the nurse     Can the clinic reply by [facility]? No     Would the patient rather a call back or a response via [facility]? Call Back     Best call back number:[phone]    Additional Information:

## 2024-09-26 NOTE — TELEPHONE ENCOUNTER
He is having a pain similar when to he had the Candida.  He wants to know if you can call in a liquid form of Diflucan.

## 2024-10-09 ENCOUNTER — OFFICE VISIT (OUTPATIENT)
Dept: GASTROENTEROLOGY | Facility: CLINIC | Age: 39
End: 2024-10-09
Payer: COMMERCIAL

## 2024-10-09 DIAGNOSIS — R13.10 DYSPHAGIA, UNSPECIFIED TYPE: Primary | ICD-10-CM

## 2024-10-09 PROCEDURE — 3044F HG A1C LEVEL LT 7.0%: CPT | Mod: CPTII,95,, | Performed by: INTERNAL MEDICINE

## 2024-10-09 PROCEDURE — 99214 OFFICE O/P EST MOD 30 MIN: CPT | Mod: 95,,, | Performed by: INTERNAL MEDICINE

## 2024-10-09 NOTE — PROGRESS NOTES
The patient location is: [place]  The chief complaint leading to consultation is: dysphagia    Visit type: audiovisual    Face to Face time with patient: 11 min  20 minutes of total time spent on the encounter, which includes face to face time and non-face to face time preparing to see the patient (eg, review of tests), Obtaining and/or reviewing separately obtained history, Documenting clinical information in the electronic or other health record, Independently interpreting results (not separately reported) and communicating results to the patient/family/caregiver, or Care coordination (not separately reported).         Each patient to whom he or she provides medical services by telemedicine is:  (1) informed of the relationship between the physician and patient and the respective role of any other health care provider with respect to management of the patient; and (2) notified that he or she may decline to receive medical services by telemedicine and may withdraw from such care at any time.    Notes:       [facility]    Referral from Dr. Ramsey    CC: dysphagia    HPI 39 y.o. 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  He symptoms started just before his cancer diagnosis.    He was scheduled for an initial EGD and aspirated.  Due to this he was transferred to [facility].  EGD performed 8/12/24 with dilation of the esophagus noted, candida esophagitis, exam consistent with achalasia and GEJ  was dilated with some temporary improvement in symptoms.  Biopsies were negative for EOE.  Esophageal manometry was consistent with Type II achalasia.    Achalasia Eckardt Score  Dysphagia  (none (0), occasional (1), daily (2), with every meal (3)): 3  Regurgitation (none (0), occasional (1), daily (2), with  every meal (3)): 2  Chest pain (none (0), occasional (1), daily (2), several times per day (3)): 2  Weight loss (kg) (0 (0), <5 (1), 5-10 (2), >10 (3)): 0  Total Eckardt Score(the final score is the sum of four components (0-12)):  7          Past Medical History  Past Medical History:   Diagnosis Date    Cancer 12/21/2023    Headache     Hypertension     145/93,  not medicated    SVT (supraventricular tachycardia)          Physical Examination  There were no vitals taken for this visit.  General appearance: alert, cooperative, no distress  HENT: Normocephalic, atraumatic, neck symmetrical, no nasal discharge   Neurologic: Alert and oriented X 3, dysarthria present, no facial droop    Labs:  Lab Results   Component Value Date    WBC 7.28 09/23/2024    HGB 13.7 (L) 09/23/2024    HCT 40.2 09/23/2024    MCV 94 09/23/2024     (L) 09/23/2024         CMP  Sodium   Date Value Ref Range Status   09/23/2024 137 136 - 145 mmol/L Final     Potassium   Date Value Ref Range Status   09/23/2024 4.0 3.5 - 5.1 mmol/L Final     Chloride   Date Value Ref Range Status   09/23/2024 99 95 - 110 mmol/L Final     CO2   Date Value Ref Range Status   09/23/2024 28 23 - 29 mmol/L Final     Glucose   Date Value Ref Range Status   09/23/2024 157 (H) 70 - 110 mg/dL Final     BUN   Date Value Ref Range Status   09/23/2024 11 6 - 20 mg/dL Final     Creatinine   Date Value Ref Range Status   09/23/2024 1.2 0.5 - 1.4 mg/dL Final     Calcium   Date Value Ref Range Status   09/23/2024 9.2 8.7 - 10.5 mg/dL Final     Total Protein   Date Value Ref Range Status   09/23/2024 7.6 6.0 - 8.4 g/dL Final     Albumin   Date Value Ref Range Status   09/23/2024 3.8 3.5 - 5.2 g/dL Final     Total Bilirubin   Date Value Ref Range Status   09/23/2024 0.8 0.1 - 1.0 mg/dL Final     Comment:     For infants and newborns, interpretation of results should be based  on gestational age, weight and in agreement with clinical  observations.    Premature Infant  recommended reference ranges:  Up to 24 hours.............<8.0 mg/dL  Up to 48 hours............<12.0 mg/dL  3-5 days..................<15.0 mg/dL  6-29 days.................<15.0 mg/dL    For patients on Eltrombopag therapy, use of Dimension [place] TBIL is   not   recommended.       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  EGD was consistent with achalasia and esophageal manometry showed Type II achalasia.    Plan:  -Schedule MBSS with speech evaluation due to dysarthria and brain mass to evaluate for OP dysphagia.  -Schedule timed barium esophagram with barium tablet.  -Schedule EGD with Endoflip and botox to LES.  -Plan to discuss at Swallowing conference.  -Could consider EUS in the future to evaluate the GEJ for evidence of pseudoachalasia.  -Call the patient's wife Ryan to schedule all testing.    Krissy Burgos MD

## 2024-10-11 ENCOUNTER — TELEPHONE (OUTPATIENT)
Dept: ENDOSCOPY | Facility: HOSPITAL | Age: 39
End: 2024-10-11
Payer: COMMERCIAL

## 2024-10-11 ENCOUNTER — LAB VISIT (OUTPATIENT)
Dept: LAB | Facility: HOSPITAL | Age: 39
End: 2024-10-11
Attending: PSYCHIATRY & NEUROLOGY
Payer: COMMERCIAL

## 2024-10-11 VITALS — WEIGHT: 200 LBS | BODY MASS INDEX: 28 KG/M2 | HEIGHT: 71 IN

## 2024-10-11 DIAGNOSIS — K22.0 ACHALASIA: Primary | ICD-10-CM

## 2024-10-11 DIAGNOSIS — C71.9 DIFFUSE MIDLINE GLIOMA, H3 K27M MUTANT: ICD-10-CM

## 2024-10-11 LAB
ALBUMIN SERPL BCP-MCNC: 4 G/DL (ref 3.5–5.2)
ALP SERPL-CCNC: 65 U/L (ref 55–135)
ALT SERPL W/O P-5'-P-CCNC: 51 U/L (ref 10–44)
ANION GAP SERPL CALC-SCNC: 10 MMOL/L (ref 3–11)
AST SERPL-CCNC: 17 U/L (ref 10–40)
BASOPHILS # BLD AUTO: 0.01 K/UL (ref 0–0.2)
BASOPHILS NFR BLD: 0.2 % (ref 0–1.9)
BILIRUB SERPL-MCNC: 0.7 MG/DL (ref 0.1–1)
BUN SERPL-MCNC: 14 MG/DL (ref 6–20)
CALCIUM SERPL-MCNC: 9.2 MG/DL (ref 8.7–10.5)
CHLORIDE SERPL-SCNC: 102 MMOL/L (ref 95–110)
CO2 SERPL-SCNC: 30 MMOL/L (ref 23–29)
CREAT SERPL-MCNC: 1.1 MG/DL (ref 0.5–1.4)
DIFFERENTIAL METHOD BLD: ABNORMAL
EOSINOPHIL # BLD AUTO: 0 K/UL (ref 0–0.5)
EOSINOPHIL NFR BLD: 0.3 % (ref 0–8)
ERYTHROCYTE [DISTWIDTH] IN BLOOD BY AUTOMATED COUNT: 13.3 % (ref 11.5–14.5)
EST. GFR  (NO RACE VARIABLE): >60 ML/MIN/1.73 M^2
GLUCOSE SERPL-MCNC: 104 MG/DL (ref 70–110)
HCT VFR BLD AUTO: 41.4 % (ref 40–54)
HGB BLD-MCNC: 14.2 G/DL (ref 14–18)
IMM GRANULOCYTES # BLD AUTO: 0.02 K/UL (ref 0–0.04)
IMM GRANULOCYTES NFR BLD AUTO: 0.3 % (ref 0–0.5)
LYMPHOCYTES # BLD AUTO: 0.9 K/UL (ref 1–4.8)
LYMPHOCYTES NFR BLD: 13.9 % (ref 18–48)
MCH RBC QN AUTO: 32.6 PG (ref 27–31)
MCHC RBC AUTO-ENTMCNC: 34.3 G/DL (ref 32–36)
MCV RBC AUTO: 95 FL (ref 82–98)
MONOCYTES # BLD AUTO: 0.6 K/UL (ref 0.3–1)
MONOCYTES NFR BLD: 9.3 % (ref 4–15)
NEUTROPHILS # BLD AUTO: 5 K/UL (ref 1.8–7.7)
NEUTROPHILS NFR BLD: 76 % (ref 38–73)
NRBC BLD-RTO: 0 /100 WBC
PLATELET # BLD AUTO: 214 K/UL (ref 150–450)
PMV BLD AUTO: 7.9 FL (ref 9.2–12.9)
POTASSIUM SERPL-SCNC: 3.8 MMOL/L (ref 3.5–5.1)
PROT SERPL-MCNC: 7.8 G/DL (ref 6–8.4)
RBC # BLD AUTO: 4.36 M/UL (ref 4.6–6.2)
SODIUM SERPL-SCNC: 142 MMOL/L (ref 136–145)
WBC # BLD AUTO: 6.56 K/UL (ref 3.9–12.7)

## 2024-10-11 PROCEDURE — 85025 COMPLETE CBC W/AUTO DIFF WBC: CPT | Performed by: PSYCHIATRY & NEUROLOGY

## 2024-10-11 PROCEDURE — 80053 COMPREHEN METABOLIC PANEL: CPT | Performed by: PSYCHIATRY & NEUROLOGY

## 2024-10-11 PROCEDURE — 36415 COLL VENOUS BLD VENIPUNCTURE: CPT | Performed by: PSYCHIATRY & NEUROLOGY

## 2024-10-11 NOTE — TELEPHONE ENCOUNTER
----- Message from Krissy Burgos MD sent at 10/9/2024  2:17 PM CDT -----  Regarding: EGD with Endoflip and Botox injection-Please call elvis Davis to schedule  [facility] PROCEDURE ORDERS    CLEARANCE FOR PROCEDURES:  [ ] Not needed       PROCEDURES    [ ] EGD with EndoFlip and Botox      FLOOR:    [ ] 2nd Floor    Reason for 2nd Floor:     [ ] End stage achalasia      PREP    [ ] Full liquid diet 3 days     MEDICATIONS    Motility Studies (esophageal manometry/anorectal manometry)  Hold narcotics x 1 days if able   Hold TCA x 1 days if able  Propofol/lidocaine only during sedation.  Discuss with Dr. Burgos if additional sedation needed.   Hold baclofen for thee days (at least one day) if able   Hold muscle relaxants x 1 day if able     Anticoagulation/antiplt agents:   No    ORDER OF TESTING:  Day 1: EGD with Endoflip and Botox injection      SCHEDULING PRIORITY  Urgent     URGENCY     [x] Medically NOT Urgent      DIAGNOSIS   [ ] Dysphagia /Achalasia       PHYSICIAN  [ ]  Ok with Dr. Burgos

## 2024-10-11 NOTE — TELEPHONE ENCOUNTER
Spoke to Sukumar to schedule procedure(s) Upper Endoscopy (EGD)       Physician to perform procedure(s) Dr. CHAPARRITA Burgos  Date of Procedure (s) 11/11/24  Arrival Time 8:45 AM  Time of Procedure(s) 9:45 AM   Location of Procedure(s) [address]  Type of Rx Prep sent to patient: Other  Instructions provided to patient via [facility]    Patient was informed on the following information and verbalized understanding. Screening questionnaire reviewed with patient and complete. If procedure requires anesthesia, a responsible adult needs to be present to accompany the patient home, patient cannot drive after receiving anesthesia. Appointment details are tentative, especially check-in time. Patient will receive a prep-op call 7 days prior to confirm check-in time for procedure. If applicable the patient should contact their pharmacy to verify Rx for procedure prep is ready for pick-up. Patient was advised to call the scheduling department at [phone] if pharmacy states no Rx is available. Patient was advised to call the endoscopy scheduling department if any questions or concerns arise.      [facility] Scheduling Department

## 2024-10-14 ENCOUNTER — TELEPHONE (OUTPATIENT)
Dept: NEUROLOGY | Facility: CLINIC | Age: 39
End: 2024-10-14
Payer: COMMERCIAL

## 2024-10-14 ENCOUNTER — PATIENT MESSAGE (OUTPATIENT)
Dept: GASTROENTEROLOGY | Facility: CLINIC | Age: 39
End: 2024-10-14
Payer: COMMERCIAL

## 2024-10-14 NOTE — TELEPHONE ENCOUNTER
Called patient to schedule follow up appointment with Dr. Og, patient will be scheduled 10/29 at 1:00pm.

## 2024-10-15 ENCOUNTER — TELEPHONE (OUTPATIENT)
Dept: ENDOSCOPY | Facility: HOSPITAL | Age: 39
End: 2024-10-15
Payer: COMMERCIAL

## 2024-10-15 NOTE — TELEPHONE ENCOUNTER
Received in basket message    October 15, 2024     SD    10/15/24  9:15 AM  Alana Burroughs routed this conversation to Me   October 14, 2024     CP    10/14/24 10:21 AM  Manuela Enciso MA routed this conversation to [facility]  Sukumar Wiseman to [facility] Staff (supporting Krissy Burgos MD)         10/14/24 10:09 AM  Good morning. We need to reschedule the EGD to the following week or any week after the 11th of November. I did not realize that I am following up with Alison on 11/11 and 11/12. Thanks     Called pt.milan with call back number

## 2024-10-15 NOTE — TELEPHONE ENCOUNTER
Spoke to pts wife to reschedule procedure(s) Upper Endoscopy (EGD)       Physician to perform procedure(s) Dr. CHAPARRITA Burgos  Date of Procedure (s) 11/18/24  Arrival Time 8:00 AM  Time of Procedure(s) 9:00 AM   Location of Procedure(s) [address]  Type of Rx Prep sent to patient: Other  Instructions provided to patient via [facility]    Patient was informed on the following information and verbalized understanding. Screening questionnaire reviewed with patient and complete. If procedure requires anesthesia, a responsible adult needs to be present to accompany the patient home, patient cannot drive after receiving anesthesia. Appointment details are tentative, especially check-in time. Patient will receive a prep-op call 7 days prior to confirm check-in time for procedure. If applicable the patient should contact their pharmacy to verify Rx for procedure prep is ready for pick-up. Patient was advised to call the scheduling department at [phone] if pharmacy states no Rx is available. Patient was advised to call the endoscopy scheduling department if any questions or concerns arise.      [facility] Scheduling Department

## 2024-10-16 ENCOUNTER — CLINICAL SUPPORT (OUTPATIENT)
Facility: HOSPITAL | Age: 39
End: 2024-10-16
Attending: INTERNAL MEDICINE
Payer: COMMERCIAL

## 2024-10-16 ENCOUNTER — HOSPITAL ENCOUNTER (OUTPATIENT)
Dept: RADIOLOGY | Facility: HOSPITAL | Age: 39
Discharge: HOME | End: 2024-10-16
Attending: INTERNAL MEDICINE
Payer: COMMERCIAL

## 2024-10-16 DIAGNOSIS — R13.10 DYSPHAGIA, UNSPECIFIED TYPE: ICD-10-CM

## 2024-10-16 PROCEDURE — 92611 MOTION FLUOROSCOPY/SWALLOW: CPT

## 2024-10-16 PROCEDURE — 74230 X-RAY XM SWLNG FUNCJ C+: CPT | Mod: TC

## 2024-10-16 NOTE — PLAN OF CARE
"[facility]  MODIFIED BARIUM SWALLOW STUDY    Date: 10/16/2024     Name: Sukumar Wiseman   MRN: [id number]    Therapy Diagnosis: mild oropharyngeal dysphagia    Physician: Krissy Burgos MD  Physician Orders: SLP Video Swallow  Medical Diagnosis from Referral: Dysphagia, unspecified type [R13.10]     Date of Evaluation:  10/16/2024    Time In:  1100  Time Out:  1130  Total Billable Time: 30     Procedure Min.   Fl Modified Barium Swallow Speech  30     Precautions: Standard    Subjective   Patient arrived to radiology suite accompanied by wife. Patient was engaged, cooperative, and motivated to participate within the evaluation procedure.    Date of Onset: approx. 5 months ago, per chart review    Past Medical History: Sukumar Wiseman  has a past medical history of Cancer (12/21/2023), Headache, Hypertension, and SVT (supraventricular tachycardia).  Sukumar Wiseman  has a past surgical history that includes Suboccipital craniotomy (Right, 01/04/2024); Esophagogastroduodenoscopy (N/A, 8/7/2024); Esophagogastroduodenoscopy (N/A, 8/12/2024); and Esophageal manometry with measurement of impedance (N/A, 8/12/2024).    The patient is a 39 y.o. male who complains of food getting stuck in esophagus causing coughing, choking, and vomitting following meals. Pt endorses esophageal stricture and medical dx of achalasia Type II. Pt denies difficulty w/ "the actual swallow mechanism." States, "it's my esophagus that's the problem because it all comes back up." Reports frequent coughing/vomiting at night while sleeping.    Patient is currently taking the following medications: has a current medication list which includes the following prescription(s): acetaminophen, amoxicillin-clavulanate 875-125mg, dexamethasone, escitalopram oxalate, gleostine, lorazepam, metoprolol tartrate, ondansetron, prochlorperazine, promethazine, and tramadol, and the following Facility-Administered Medications: " mupirocin.    History was provided by patient, wife, and/or taken from chart review:   -Current diet at home: regular/thin  -Therapy received: n/a  -Neurological:  ataxia and cerebellar mass  -Gastroenterologist (GI) :  GERD, esophageal dysphagia, esophageal stricture    -Pulmonary: Pt denied any pulmonary diagnoses.   -Cancer: Diffuse midline glioma, H3 K27M mutant    The following observations were made:   -Mental status: Oriented x4, Alert, and Cooperative  -Factors affecting performance: no difficulties participating in the study  -Feeding Method: independent in self-feeding    Respiratory Status:   -Respiratory Status: room air    Medical Hx and Allergies:  Review of patient's allergies indicates:  No Known Allergies    Medications that May Affect Swallowing     Anti-Anxiety Medications  (Used to treat anxiety disorders)  []Alprazolam (Xanax)  [x]Lorazepam (Ativan)  []Clonazepam (Klonopin)  []Teazepam (Restoril)  []Diazepam (Valium)   []Triazolam (Halcion)    Anti-Depressants  (Used to treat depressive disorders, anxiety, OCD, ADHD, sleep disorders, migraines)  SSRIs     SNRIs       []Citalopram (Celexa)  []Duolexetine (Cymbalta)   [x]Escitalopram (Lexapro)  []Venlafaxine (Effexor)  []Fluoxetine (Prozac)  []Paroxetine (Paxil)  []Sertraline (Zoloft)    Beta-Blockers  (Used to treat hypertension arrhythmia, a-fib, angina, CHF, migraine prophylaxis)  []Acebutolol (Sectol)  []Carvedilol (Coreg)  [x]Metoprolol (Lopressor)  []Nadolel (Corgard)    Pain Scale:  0/10 on Numerical Rating Scale currently.     **Per pt and wife report, the following questionnaires were filled out in correspondence w/ esophageal phase of swallow function:  EAT-10 Score:   Eating Assessment Tool (EAT-10) is a questionnaire given to the patient to  his swallowing function.     Key: 0= no problem; 4= severe problem  1. My swallowing problem has caused me to lose weight. - 0  2. My swallowing problem interferes with my ability to go out  for meals. - 0  3. Swallowing liquids takes extra effort. - 0  4. Swallowing solids takes extra effort. - 0  5. Swallowing pills takes extra effort. - 0  6. Swallowing is painful. - 0  7. The pleasure of eating is affected by my swallowing. - 0  8. When I swallow food sticks in my throat. - 4  9. I cough when I eat. - 4  10. Swallowing is stressful. - 3    Sukumar ranked his swallowing function as a 11/40     Interpretation: Score of greater than 3 is indicative of a swallowing disorder (Teo et al., 2008); higher scores correlate with increased penetration-aspiration  scale scores, and scores greater than 15 results in the patient being 2.2 times more likely to aspirate (Clarence et al., 2015)    References:   JACKLYN Bruce., MERVAT Lyles., KATHERYN Davila., SUN Collier., BIBIANA Nobles., SUN Mcfarland, & EMMY Murillo. (2008). Validity and reliability of the Eating Assessment Tool (EAT-10). Annals of Otology, Rhinology & Laryngology, 117(12), 919-924. https://doi.org/10.1177/[email or web address]  MERVAT Lima., CHAPARRITA Jimenez., SUN Anderson., Omar, M. A., & JACKLYN Bruce. (2015). The ability of the 10-item eating assessment tool (EAT-10) to predict aspiration risk in persons with dysphagia. Annals of Otology, Rhinology & Laryngology, 124(5), 351-354. https://doi.org/10.1177/3895736961217096     Reflux Symptom Index (RSI) was completed to assess the possible presence and/or severity of LPR symptoms and any relationship between this condition and the pt's dysphagia; score of ~15 may indicate LPR):     Hoarseness or a problem with your voice: 0  Clearing your throat: 0  Excess throat or mucous post-nasal drip: 0  Difficulty swallowing food, liquid, or pills: 0  Coughing after you ate or after lying down: 5  Breathing difficulties or choking episodes: 5  Troublesome or annoying cough: 4  Sensation or something sticking in your throat: 4  Heartburn, chest pain, indigestion, or stomach acid coming up: 5    TOTAL: 23    Objective  "    Modified Barium Swallow Study  Purpose: to evaluate anatomy and physiology of the oropharyngeal swallow, to determine effectiveness of rehabilitation strategies, and to determine diet consistency and intervention recommendations. The study was performed using the "Gold Standard" of 30 fps with as low as reasonably achievable (ALARA) exposure.     The patient was seen in radiology standing upright for lateral views of the larynx and an A/P view. The study was conducted using Varibar thin liquid (IDDSI 0), Varibar nectar liquid (IDDSI 2), pudding mixed w/ Varibar thin liquid (IDDSI 4), Peaches covered in Varibar thin liquid (IDDSI 6/0), and roger cracker coated in Varibar thin liquid mixed w/ pudding (IDDSI 7). The patient tolerated the procedure well.     Oral Motor/Cranial Nerve Exam:  Cranial Nerve 5: Trigeminal Nerve  Motor Jaw Posture at rest: Closed  Mandible Elevation/Depression: WFL  Mandible lateralization: WFL  Abnormal movement: absent Interpretation: [facility]     Cranial Nerve 7: Facial Nerve  Motor Facial Symmetry: asymmetry  Wrinkle Forehead: WFL  Close eyes tightly: WFL  Labial Protrusion: WFL  Labial Retraction: WFL  Abnormal movement: absent Interpretation: [facility]   Sensory Formal testing not completed. Pt denied any changes in taste      Cranial Nerves IX and X: Glossopharyngeal and Vagus Nerves  Motor Palatal Symmetry (Rest): WNL  Palatal Symmetry (Movement): WNL  Cough: Perceptually strong  Voice Prior to PO intake: Clear  Resonance: Normal  Abnormal movement: absent Interpretation: [facility]     Cranial Nerve XII: Hypoglossal Nerve  Motor Tongue at rest: WNL  Lingual Protrusion: WNL  Lingual Lateralization: WNL  Abnormal movement: absent Interpretation: [facility]     Oral mechanism examination revealed the following: WFL oral motor skills    CONSISTENCIES ADMINISTERED:    Consistency  Presentation  Rosenbeck's Penetration/Aspiration Scale (PAS) Strategy Attempted    Thin (IDDSI 0) 5ml x2 Self-regulated cup sip " x3 Rapid consecutive cup sip x1 Self-regulated straw sip x1    Views:  - Lateral view  - AP view   Best: (1) Material does not enter the airway    Worst: (2) Material enters the airway, remains above the vocal folds, and is ejected from the airway   No strategies completed or needed   Nectar thick (mildly thick/IDDSI 2) 5ml x1 Self-regulated cup sip x1    Views:  - Lateral view Best: (1) Material does not enter the airway    Worst: (1) Material does not enter the airway   No strategies completed or needed   Puree (extremely thick/ IDDSI 4) 5ml x1    Views:  - Lateral view Best: (1) Material does not enter the airway    Worst: (1) Material does not enter the airway   No strategies completed or needed   Mixed consistency (thin/ IDDSI 0 + soft and bite sized/ IDDSI 6) - 2 peaches in juice x1    View:  - Lateral view   Best: (1) Material does not enter the airway    Worst: (1) Material does not enter the airway   No strategies completed or needed   Solid (regular/ IDDSI 7) - bite of roger cracker x1    View:  - Lateral view  Best: (1) Material does not enter the airway    Worst: (1) Material does not enter the airway   No strategies completed or needed   Barium tablet  -Self-administered in thin barium bolus    View:  - Lateral view  (1) Material does not enter the airway No strategies completed or needed     Oral Preparation / Oral Phase   Adequate bolus acceptance   Timely A-P transfer across consistencies   Poor bolus control   Mild oral residue present post swallow   No presence of naso-pharyngeal reflux  Pharyngeal Phase   Swallow delay w/ trigger at the level of the valleculae and/or pyriform sinus; pharyngeal pooling present  Pt w/ adequate hyolaryngeal elevation and excursion patterns  Pt w/ complete epiglottic inversion patterns  Pt w/ flash penetration during the swallow w/ thin liquid consistency  Pt w/o aspiration before/during/after swallow across all consistencies trialed  Pt w/ adequate airway protection  w/o penetration or aspiration w/ nectar-thick, puree, soft solid, and regular solid consistencies  Trace residue noted at the valleculae w/ thin and nectar-thick liquids  Timely and efficient pharyngeal clearance of barium tablet  Cervical Esophageal Phase   Noted esophageal stricture at lower esophageal sphincter (LES), resulting in significantly delayed emptying/collection of all bolus types in esophagus  Barium tablet observed to lodge in LES  Chin tuck w/ effortful swallow successful in opening of LES for clearance of bolus collection    Functional Oral Intake Scale (FOIS)  The Functional Oral Intake Scale (FOIS) is an ordinal scale that is used to assess the current status and meaningful change in the oral intake. FOIS levels include:    TUBE DEPENDENT (levels 1-3) 1. No oral intake  2. Tube dependent with minimal/inconsistent oral intake  3. Tube supplements with consistent oral intake      TOTAL ORAL INTAKE (levels 4-7) 4. Total oral intake of a single consistency  5. Total oral intake of multiple consistencies requiring special preparation  6. Total oral intake with no special preparation, but must avoid specific foods or liquid items  7. Total oral intake with no restrictions     Patient is currently judged to be at FOIS level 6.      Treatment   Total Treatment Time: n/a  Patient educated regarding results and recommendations of the evaluation. See the recommendations section below.    Education: Anatomy and physiology of swallow mechanism as it relates to MBSS findings and recommendations were discussed with the patient and wife. Patient and wife v/u. All questions were answered.     Assessment     Sukumar Wiseman is a 39 y.o. male referred for Modified Barium Swallow Study with a medical diagnosis of Dysphagia, unspecified type. Patient endorses a chief complaint of achalasia II.   Oral phase is remarkable for reduced bolus control and mild oral residue. Pharyngeal phase is remarkable for delayed  swallow initiation, flash laryngeal penetration, and trace pharyngeal residue. Esophageal screening completed with esophageal stricture at LES noted, resulting in collection of all boluses in esophagus and significantly delayed emptying.     Impressions: Patient presents with mild oropharyngeal dysphagia. Both swallow safety and swallow efficiency are preserved. Patient appears to be at no risk for aspiration related pneumonia from a primary oropharyngeal dysphagia in consideration of three pillars of aspiration pneumonia (Vielka, 2005) including oral health status, overall health/immune status, and laryngeal vestibule closure/severity of dysphagia.     References:  SUN Vora (2005, March). Pneumonia: Factors Beyond Aspiration. Perspectives in Swallowing and Swallowing Disorders (Dysphagia), 14, 10-16.    Recommendations:     Consistency Recommendations:  Thin liquids (IDDSI 0) and regular solid consistencies (IDDSI 7).  Avoid dry, crumbly foods.   Risk Management: use good oral hygiene , sit upright for all PO intake, increase physical mobility as tolerated, behavioral reflux precautions, small bites and sips, multiple swallows per bolus, chin tuck, remain upright for at least 1-2 hours following any PO intake, eat small meals throughout the day to reduce discomfort associated with delayed emptying of the esophagus, and encourage volitional dry swallows and coughs throughout meals  Specialist Referrals: GI for full esophageal workup   Ancillary Tests: Per GI physician  Therapy: Dysphagia therapy is not recommended at this time.  Follow-up exam: Follow up swallow study is not indicated at this time.    Please contact [facility] at [phone] if there are questions or concerns.    Meagan Marin M.S., L-SLP, CCC-SLP  Speech-Language Pathologist  10/16/2024     I CERTIFY THE NEED FOR THESE SERVICES FURNISHED UNDER THIS PLAN OF TREATMENT AND WHILE UNDER MY  CARE      Physician Name: _______________________________    Physician Signature: ____________________________

## 2024-10-17 RX ORDER — POTASSIUM CHLORIDE 3 G/15ML
20 SOLUTION ORAL DAILY
Qty: 30 ML | Refills: 0 | Status: SHIPPED | OUTPATIENT
Start: 2024-10-17 | End: 2024-10-21

## 2024-10-19 ENCOUNTER — TELEPHONE (OUTPATIENT)
Dept: ENDOSCOPY | Facility: HOSPITAL | Age: 39
End: 2024-10-19
Payer: COMMERCIAL

## 2024-10-19 NOTE — TELEPHONE ENCOUNTER
----- Message from Alana sent at 10/18/2024  4:33 PM CDT -----  Regarding: FW: r/s proc on 11/18  Contact: [phone]    ----- Message -----  From: Manuela Enciso MA  Sent: 10/18/2024   8:10 AM CDT  To: [facility]  Subject: r/s proc on 11/18                                  ----- Message -----  From: Marysol Keene  Sent: 10/18/2024   8:07 AM CDT  To: [facility]  Subject: APPT                                             Pt is calling to speak to someone in the office to r/s his appt that he is currently scheduled for; On 11/18 no available appts in Epic. Please call to [phone] Thanks.     Patient's DX:     Additional Info:

## 2024-10-19 NOTE — TELEPHONE ENCOUNTER
Return call and spoke with wife of patient. Reschedule endoscopy procedure. Updated instructions to portal.     Referral for procedure from Telephone call - direct access patient      Spoke to patient to schedule procedure(s) Upper Endoscopy (EGD)       Physician to perform procedure(s) Dr. CHAPARRITA Burgos  Date of Procedure (s) 11/25/2024  Arrival Time 8:00 Am   Time of Procedure(s) 9:00 Am    Location of Procedure(s) [address]  Type of Rx Prep sent to patient: Full liquid diet   Instructions provided to [facility] via [facility]    Patient was informed on the following information and verbalized understanding. Screening questionnaire reviewed with patient and complete. If procedure requires anesthesia, a responsible adult needs to be present to accompany the patient home, patient cannot drive after receiving anesthesia. Appointment details are tentative, especially check-in time. Patient will receive a prep-op call 7 days prior to confirm check-in time for procedure. If applicable the patient should contact their pharmacy to verify Rx for procedure prep is ready for pick-up. Patient was advised to call the scheduling department at [phone] if pharmacy states no Rx is available. Patient was advised to call the endoscopy scheduling department if any questions or concerns arise.      [facility] Scheduling Department

## 2024-10-23 ENCOUNTER — LAB VISIT (OUTPATIENT)
Dept: LAB | Facility: HOSPITAL | Age: 39
End: 2024-10-23
Attending: PSYCHIATRY & NEUROLOGY
Payer: COMMERCIAL

## 2024-10-23 DIAGNOSIS — C71.9 DIFFUSE MIDLINE GLIOMA, H3 K27M MUTANT: ICD-10-CM

## 2024-10-23 LAB
ALBUMIN SERPL BCP-MCNC: 3.8 G/DL (ref 3.5–5.2)
ALP SERPL-CCNC: 59 U/L (ref 55–135)
ALT SERPL W/O P-5'-P-CCNC: 45 U/L (ref 10–44)
ANION GAP SERPL CALC-SCNC: 6 MMOL/L (ref 3–11)
AST SERPL-CCNC: 18 U/L (ref 10–40)
BASOPHILS # BLD AUTO: 0.01 K/UL (ref 0–0.2)
BASOPHILS NFR BLD: 0.2 % (ref 0–1.9)
BILIRUB SERPL-MCNC: 0.7 MG/DL (ref 0.1–1)
BUN SERPL-MCNC: 10 MG/DL (ref 6–20)
CALCIUM SERPL-MCNC: 9.1 MG/DL (ref 8.7–10.5)
CHLORIDE SERPL-SCNC: 101 MMOL/L (ref 95–110)
CO2 SERPL-SCNC: 30 MMOL/L (ref 23–29)
CREAT SERPL-MCNC: 1 MG/DL (ref 0.5–1.4)
DIFFERENTIAL METHOD BLD: ABNORMAL
EOSINOPHIL # BLD AUTO: 0 K/UL (ref 0–0.5)
EOSINOPHIL NFR BLD: 0.3 % (ref 0–8)
ERYTHROCYTE [DISTWIDTH] IN BLOOD BY AUTOMATED COUNT: 13.6 % (ref 11.5–14.5)
EST. GFR  (NO RACE VARIABLE): >60 ML/MIN/1.73 M^2
GLUCOSE SERPL-MCNC: 94 MG/DL (ref 70–110)
HCT VFR BLD AUTO: 41.1 % (ref 40–54)
HGB BLD-MCNC: 13.9 G/DL (ref 14–18)
IMM GRANULOCYTES # BLD AUTO: 0.02 K/UL (ref 0–0.04)
IMM GRANULOCYTES NFR BLD AUTO: 0.3 % (ref 0–0.5)
LYMPHOCYTES # BLD AUTO: 1.1 K/UL (ref 1–4.8)
LYMPHOCYTES NFR BLD: 18.4 % (ref 18–48)
MCH RBC QN AUTO: 32.6 PG (ref 27–31)
MCHC RBC AUTO-ENTMCNC: 33.8 G/DL (ref 32–36)
MCV RBC AUTO: 96 FL (ref 82–98)
MONOCYTES # BLD AUTO: 0.7 K/UL (ref 0.3–1)
MONOCYTES NFR BLD: 12.5 % (ref 4–15)
NEUTROPHILS # BLD AUTO: 4 K/UL (ref 1.8–7.7)
NEUTROPHILS NFR BLD: 68.3 % (ref 38–73)
NRBC BLD-RTO: 0 /100 WBC
PLATELET # BLD AUTO: 202 K/UL (ref 150–450)
PMV BLD AUTO: 8.3 FL (ref 9.2–12.9)
POTASSIUM SERPL-SCNC: 3.4 MMOL/L (ref 3.5–5.1)
PROT SERPL-MCNC: 7.4 G/DL (ref 6–8.4)
RBC # BLD AUTO: 4.27 M/UL (ref 4.6–6.2)
SODIUM SERPL-SCNC: 137 MMOL/L (ref 136–145)
WBC # BLD AUTO: 5.82 K/UL (ref 3.9–12.7)

## 2024-10-23 PROCEDURE — 85025 COMPLETE CBC W/AUTO DIFF WBC: CPT | Performed by: PSYCHIATRY & NEUROLOGY

## 2024-10-23 PROCEDURE — 80053 COMPREHEN METABOLIC PANEL: CPT | Performed by: PSYCHIATRY & NEUROLOGY

## 2024-10-23 PROCEDURE — 36415 COLL VENOUS BLD VENIPUNCTURE: CPT | Performed by: PSYCHIATRY & NEUROLOGY

## 2024-10-29 ENCOUNTER — HOSPITAL ENCOUNTER (OUTPATIENT)
Dept: RADIOLOGY | Facility: HOSPITAL | Age: 39
Discharge: HOME | End: 2024-10-29
Attending: INTERNAL MEDICINE
Payer: COMMERCIAL

## 2024-10-29 ENCOUNTER — OFFICE VISIT (OUTPATIENT)
Dept: NEUROSURGERY | Facility: CLINIC | Age: 39
End: 2024-10-29
Payer: COMMERCIAL

## 2024-10-29 ENCOUNTER — TELEPHONE (OUTPATIENT)
Dept: NEUROLOGY | Facility: CLINIC | Age: 39
End: 2024-10-29
Payer: COMMERCIAL

## 2024-10-29 VITALS
DIASTOLIC BLOOD PRESSURE: 81 MMHG | HEART RATE: 80 BPM | BODY MASS INDEX: 28.73 KG/M2 | SYSTOLIC BLOOD PRESSURE: 123 MMHG | HEIGHT: 71 IN | WEIGHT: 205.25 LBS

## 2024-10-29 DIAGNOSIS — R13.10 DYSPHAGIA, UNSPECIFIED TYPE: ICD-10-CM

## 2024-10-29 DIAGNOSIS — C71.9 DIFFUSE MIDLINE GLIOMA, H3 K27M MUTANT: Primary | ICD-10-CM

## 2024-10-29 DIAGNOSIS — G93.6 CEREBRAL EDEMA: ICD-10-CM

## 2024-10-29 PROCEDURE — 99999 PR PBB SHADOW E&M-EST. PATIENT-LVL III: CPT | Mod: PBBFAC,,, | Performed by: PSYCHIATRY & NEUROLOGY

## 2024-10-29 PROCEDURE — 3079F DIAST BP 80-89 MM HG: CPT | Mod: CPTII,S$GLB,, | Performed by: PSYCHIATRY & NEUROLOGY

## 2024-10-29 PROCEDURE — 74220 X-RAY XM ESOPHAGUS 1CNTRST: CPT | Mod: TC

## 2024-10-29 PROCEDURE — 3008F BODY MASS INDEX DOCD: CPT | Mod: CPTII,S$GLB,, | Performed by: PSYCHIATRY & NEUROLOGY

## 2024-10-29 PROCEDURE — 99215 OFFICE O/P EST HI 40 MIN: CPT | Mod: S$GLB,,, | Performed by: PSYCHIATRY & NEUROLOGY

## 2024-10-29 PROCEDURE — 3044F HG A1C LEVEL LT 7.0%: CPT | Mod: CPTII,S$GLB,, | Performed by: PSYCHIATRY & NEUROLOGY

## 2024-10-29 PROCEDURE — 25500020 PHARM REV CODE 255: Performed by: INTERNAL MEDICINE

## 2024-10-29 PROCEDURE — G2211 COMPLEX E/M VISIT ADD ON: HCPCS | Mod: S$GLB,,, | Performed by: PSYCHIATRY & NEUROLOGY

## 2024-10-29 PROCEDURE — 74220 X-RAY XM ESOPHAGUS 1CNTRST: CPT | Mod: 26,,, | Performed by: RADIOLOGY

## 2024-10-29 PROCEDURE — 3074F SYST BP LT 130 MM HG: CPT | Mod: CPTII,S$GLB,, | Performed by: PSYCHIATRY & NEUROLOGY

## 2024-10-29 PROCEDURE — 1159F MED LIST DOCD IN RCRD: CPT | Mod: CPTII,S$GLB,, | Performed by: PSYCHIATRY & NEUROLOGY

## 2024-10-29 PROCEDURE — A9698 NON-RAD CONTRAST MATERIALNOC: HCPCS | Performed by: INTERNAL MEDICINE

## 2024-10-29 RX ADMIN — BARIUM SULFATE 60 ML: 0.6 SUSPENSION ORAL at 09:10

## 2024-10-29 RX ADMIN — BARIUM SULFATE 200 ML: 0.81 POWDER, FOR SUSPENSION ORAL at 09:10

## 2024-11-07 ENCOUNTER — LAB VISIT (OUTPATIENT)
Dept: LAB | Facility: HOSPITAL | Age: 39
End: 2024-11-07
Attending: PHYSICIAN ASSISTANT
Payer: COMMERCIAL

## 2024-11-07 DIAGNOSIS — C71.0 MALIGNANT NEOPLASM OF CEREBRUM, EXCEPT LOBES AND VENTRICLES: ICD-10-CM

## 2024-11-07 DIAGNOSIS — C71.9 DIFFUSE MIDLINE GLIOMA, H3 K27M MUTANT: Primary | ICD-10-CM

## 2024-11-07 LAB
ALBUMIN SERPL BCP-MCNC: 3.7 G/DL (ref 3.5–5.2)
ALP SERPL-CCNC: 66 U/L (ref 55–135)
ALT SERPL W/O P-5'-P-CCNC: 81 U/L (ref 10–44)
ANION GAP SERPL CALC-SCNC: 4 MMOL/L (ref 3–11)
AST SERPL-CCNC: 32 U/L (ref 10–40)
BASOPHILS # BLD AUTO: 0.02 K/UL (ref 0–0.2)
BASOPHILS NFR BLD: 0.2 % (ref 0–1.9)
BILIRUB SERPL-MCNC: 1 MG/DL (ref 0.1–1)
BUN SERPL-MCNC: 9 MG/DL (ref 6–20)
CALCIUM SERPL-MCNC: 9.1 MG/DL (ref 8.7–10.5)
CHLORIDE SERPL-SCNC: 104 MMOL/L (ref 95–110)
CO2 SERPL-SCNC: 30 MMOL/L (ref 23–29)
CREAT SERPL-MCNC: 1.1 MG/DL (ref 0.5–1.4)
DIFFERENTIAL METHOD BLD: ABNORMAL
EOSINOPHIL # BLD AUTO: 0 K/UL (ref 0–0.5)
EOSINOPHIL NFR BLD: 0.2 % (ref 0–8)
ERYTHROCYTE [DISTWIDTH] IN BLOOD BY AUTOMATED COUNT: 13.1 % (ref 11.5–14.5)
EST. GFR  (NO RACE VARIABLE): >60 ML/MIN/1.73 M^2
GLUCOSE SERPL-MCNC: 105 MG/DL (ref 70–110)
HCT VFR BLD AUTO: 42.4 % (ref 40–54)
HGB BLD-MCNC: 14.8 G/DL (ref 14–18)
IMM GRANULOCYTES # BLD AUTO: 0.03 K/UL (ref 0–0.04)
IMM GRANULOCYTES NFR BLD AUTO: 0.3 % (ref 0–0.5)
LYMPHOCYTES # BLD AUTO: 0.7 K/UL (ref 1–4.8)
LYMPHOCYTES NFR BLD: 7.7 % (ref 18–48)
MCH RBC QN AUTO: 32.9 PG (ref 27–31)
MCHC RBC AUTO-ENTMCNC: 34.9 G/DL (ref 32–36)
MCV RBC AUTO: 94 FL (ref 82–98)
MONOCYTES # BLD AUTO: 0.8 K/UL (ref 0.3–1)
MONOCYTES NFR BLD: 8.8 % (ref 4–15)
NEUTROPHILS # BLD AUTO: 7.9 K/UL (ref 1.8–7.7)
NEUTROPHILS NFR BLD: 82.8 % (ref 38–73)
NRBC BLD-RTO: 0 /100 WBC
PLATELET # BLD AUTO: 137 K/UL (ref 150–450)
PMV BLD AUTO: 8.1 FL (ref 9.2–12.9)
POTASSIUM SERPL-SCNC: 3.5 MMOL/L (ref 3.5–5.1)
PROT SERPL-MCNC: 7.4 G/DL (ref 6–8.4)
RBC # BLD AUTO: 4.5 M/UL (ref 4.6–6.2)
SODIUM SERPL-SCNC: 138 MMOL/L (ref 136–145)
WBC # BLD AUTO: 9.56 K/UL (ref 3.9–12.7)

## 2024-11-07 PROCEDURE — 85025 COMPLETE CBC W/AUTO DIFF WBC: CPT | Performed by: PHYSICIAN ASSISTANT

## 2024-11-07 PROCEDURE — 36415 COLL VENOUS BLD VENIPUNCTURE: CPT | Performed by: PHYSICIAN ASSISTANT

## 2024-11-07 PROCEDURE — 80053 COMPREHEN METABOLIC PANEL: CPT | Performed by: PHYSICIAN ASSISTANT

## 2024-11-13 ENCOUNTER — TELEPHONE (OUTPATIENT)
Dept: GASTROENTEROLOGY | Facility: CLINIC | Age: 39
End: 2024-11-13
Payer: COMMERCIAL

## 2024-11-13 ENCOUNTER — PATIENT MESSAGE (OUTPATIENT)
Dept: GASTROENTEROLOGY | Facility: CLINIC | Age: 39
End: 2024-11-13
Payer: COMMERCIAL

## 2024-11-13 DIAGNOSIS — K22.0 ACHALASIA: Primary | ICD-10-CM

## 2024-11-13 NOTE — TELEPHONE ENCOUNTER
[facility]   BENIGN FOREGUT MULTIDISCIPLINARY CONFERENCE  PATIENT REVIEW FORM  ____________________________________________________________    CLINIC #: [id number]    DATE: 11/13/2024    DIAGNOSIS:     [] Dysphagia [] GERD                                        [] Slipped Nissen                      [] Gastroparesis                     [] LP Reflux     [x] Achalasia        [] Hypercontractile esophagus      [] Hiatal Hernia                         [] Rumination Syndrome    [] CP Bar     [] EGJOO            [] Diffuse esophageal spasm        [] Para-esophageal Hernia       [] Cyclic Vomiting                  [] Zenker's Diverticulum     [] Dysmotility     [] Pseudoachalasia                       [] Esophageal diverticulum       [] Dumping Syndrome            [] Oropharyngeal dysphagia     [] Herron's              [] OTHER:     PRESENTER:      [] Dr. Doshi    [] Dr. Garcia   [] Dr. Morales   [] Dr. Perry           [] Dr. Goodman  [] Dr. Hodgson   [x] Dr. Burgos    [] Other:       PATIENT SUMMARY:   The patient is a 39 y.o. 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  He symptoms started just before his cancer diagnosis. EGD was concerning for achalasia and esophageal manometry showed Type II achalasia.    RECOMMENDATIONS:   Keep EGD with Endoflip and Botox as scheduled.  Schedule appointment to see Foregut surgery to discuss surgical options for achalasia.    CONSULT NEEDED:         [x] Foregut Surg   [] ENT                [] GI            [] CT Surg         [] Psychiatry        [] Psychology     [] Speech       IMAGING:       [] TBS      [] MBS    [] CT ABD/PELVIS       [] GES      [] MRI     [] CT chest    [] EKG      [] U/S     [] UGI w/SBFT        PROCEDURES:    [] EGD       [] EGD w dilation   [] EGD w Botox        [] BRAVO  [] Ph Impedance  [] Manometry    [] Endoflip  [] EUS                     [] OTHER:     SURGERY  [] Heller Myotomy      [] POEM                 []  Diverticulectomy  [] Nissen Fundoplication       [] Jarad Fundoplication      [] Toupet Fundoplication  [] Hiatal Hernia Repair   [] Paraesophageal Hernia Repair

## 2024-11-14 ENCOUNTER — PATIENT MESSAGE (OUTPATIENT)
Dept: NEUROSURGERY | Facility: CLINIC | Age: 39
End: 2024-11-14
Payer: COMMERCIAL

## 2024-11-20 ENCOUNTER — OFFICE VISIT (OUTPATIENT)
Dept: SURGERY | Facility: CLINIC | Age: 39
End: 2024-11-20
Payer: COMMERCIAL

## 2024-11-20 VITALS
DIASTOLIC BLOOD PRESSURE: 84 MMHG | HEART RATE: 75 BPM | HEIGHT: 71 IN | BODY MASS INDEX: 27.33 KG/M2 | SYSTOLIC BLOOD PRESSURE: 126 MMHG | WEIGHT: 195.19 LBS

## 2024-11-20 DIAGNOSIS — K22.0 ACHALASIA: ICD-10-CM

## 2024-11-20 DIAGNOSIS — C71.9 DIFFUSE MIDLINE GLIOMA, H3 K27M MUTANT: Primary | ICD-10-CM

## 2024-11-20 PROCEDURE — 1160F RVW MEDS BY RX/DR IN RCRD: CPT | Mod: CPTII,S$GLB,, | Performed by: SURGERY

## 2024-11-20 PROCEDURE — 99999 PR PBB SHADOW E&M-EST. PATIENT-LVL IV: CPT | Mod: PBBFAC,,, | Performed by: SURGERY

## 2024-11-20 PROCEDURE — 3044F HG A1C LEVEL LT 7.0%: CPT | Mod: CPTII,S$GLB,, | Performed by: SURGERY

## 2024-11-20 PROCEDURE — 3008F BODY MASS INDEX DOCD: CPT | Mod: CPTII,S$GLB,, | Performed by: SURGERY

## 2024-11-20 PROCEDURE — 99214 OFFICE O/P EST MOD 30 MIN: CPT | Mod: S$GLB,,, | Performed by: SURGERY

## 2024-11-20 PROCEDURE — 3079F DIAST BP 80-89 MM HG: CPT | Mod: CPTII,S$GLB,, | Performed by: SURGERY

## 2024-11-20 PROCEDURE — 1159F MED LIST DOCD IN RCRD: CPT | Mod: CPTII,S$GLB,, | Performed by: SURGERY

## 2024-11-20 PROCEDURE — 3074F SYST BP LT 130 MM HG: CPT | Mod: CPTII,S$GLB,, | Performed by: SURGERY

## 2024-11-20 NOTE — PROGRESS NOTES
GENERAL SURGERY CLINIC H&P    Subjective:     Sukumar Wiseman is a 39 y.o. male with PMH cerebellar glioma who presents to clinic for discussion of his recent diagnosis of achalasia. Patient has previously discussed at our swallow conference. Patient was initially diagnosed with his brain tumor in 2023. He has completed radiation in March of 2024 and currently is taking chemotherapy every 6 weeks. Of note, he is also currently taking a small dose of steroids. Patient states that around the time of his tumor diagnosis he began to notice some difficulty with swallowing and food getting stuck. This has continued to worsen and currently he has difficulty with both food and liquid. Patient states every time he eats or drinks he is able to feel the sensation of this getting stuck in his throat. Sometime he is able to get this to pass but other times he vomits this back up. He has lost 10 pounds in the past 2 weeks due to inability to eat/drink. His wife is also present with him today and states that he is constantly coughing all night and even vomits at night. Denies any constipation/diarrhea. Patient has had a previous workup in which a timed barium swallow demonstrated achalasia. He underwent an egd on 8/1 which was aborted due to aspiration. On 8/12 he underwent another EGD with dilation performed. Patient states this did not improve his symptoms at all. He is now scheduled for EGD with botox on 11/25. Due to his current chemotherapy regimen the recommendation is that he should only undergo surgical intervention on the 5-6th week of his regimen due to issues with a drop in platelets and immunosuppression from his therapy. Patient now presents to us with questions and concerns about possible surgical intervention.     PMH:   Past Medical History:   Diagnosis Date    Cancer 12/21/2023    Headache     Hypertension     145/93,  not medicated    SVT (supraventricular tachycardia)        Past Surgical History:   Past  Surgical History:   Procedure Laterality Date    ESOPHAGEAL MANOMETRY WITH MEASUREMENT OF IMPEDANCE N/A 8/12/2024    Procedure: MANOMETRY, ESOPHAGUS, WITH IMPEDANCE MEASUREMENT;  Surgeon: Vaughn Ramsey MD;  Location: [facility] (2ND FLR);  Service: Endoscopy;  Laterality: N/A;    ESOPHAGOGASTRODUODENOSCOPY N/A 8/7/2024    Procedure: EGD (ESOPHAGOGASTRODUODENOSCOPY);  Surgeon: Gail Chandler MD;  Location: [facility];  Service: General;  Laterality: N/A;  with dilation  1st  @ 6:00  EGD ATTEMPTED; ABORTED    ESOPHAGOGASTRODUODENOSCOPY N/A 8/12/2024    Procedure: EGD (ESOPHAGOGASTRODUODENOSCOPY);  Surgeon: Vaughn Ramsey MD;  Location: [facility] (2ND FLR);  Service: Endoscopy;  Laterality: N/A;    SUBOCCIPITAL CRANIOTOMY Right 01/04/2024    Procedure: CRANIOTOMY, SUBOCCIPITAL MIS;  Surgeon: Luis Wallace MD;  Location: [facility] OR [address] [place] [address];  Service: Neurosurgery;  Laterality: Right;       Social History:  Social History     Socioeconomic History    Marital status:    Tobacco Use    Smoking status: Never     Passive exposure: Never    Smokeless tobacco: Never   Substance and Sexual Activity    Alcohol use: Not Currently    Drug use: Never    Sexual activity: Yes     Partners: Female     Birth control/protection: None     Social Drivers of Health     Financial Resource Strain: Patient Declined (8/8/2024)    Overall Financial Resource Strain (CARDIA)     Difficulty of Paying Living Expenses: Patient declined   Food Insecurity: Patient Declined (8/8/2024)    Hunger Vital Sign     Worried About Running Out of Food in the Last Year: Patient declined     Ran Out of Food in the Last Year: Patient declined   Transportation Needs: Patient Declined (8/8/2024)    TRANSPORTATION NEEDS     Transportation : Patient declined   Physical Activity: Patient Declined (8/8/2024)    Exercise Vital Sign     Days of Exercise per Week: Patient declined     Minutes of Exercise per Session: Patient declined   Stress: Patient  Declined (8/8/2024)    [employer] - Occupational Stress Questionnaire     Feeling of Stress : Patient declined   Housing Stability: Patient Declined (8/8/2024)    Housing Stability Vital Sign     Unable to Pay for Housing in the Last Year: Patient declined     Homeless in the Last Year: Patient declined       Allergies: Review of patient's allergies indicates:  No Known Allergies    Medications:  Current Outpatient Medications on File Prior to Visit   Medication Sig Dispense Refill    acetaminophen (TYLENOL) 500 MG tablet Take 500 mg by mouth every 4 (four) hours as needed.      dexAMETHasone (DECADRON) 4 MG Tab --a single 2mg tablet every 6 hours for 3 days --a single 2mg tablet every 8 hours for 3 days --a single 2mg tablet every 12 hours for 4 days --a single 2mg tablet every morning for 4 days (Patient taking differently: --a single 2mg tablet every 6 hours for 3 days --a single 2mg tablet every 8 hours for 3 days --a single 2mg tablet every 12 hours for 4 days --a single 2mg tablet every morning for 4 days    Pt taking once daily 4mg as of 1.24.24) 33 tablet 0    EScitalopram oxalate (LEXAPRO) 10 MG tablet Take 1 tablet (10 mg total) by mouth once daily. 30 tablet 11    lomustine (GLEOSTINE) 100 MG capsule Take 200 mg (2 capsules) by mouth at bedtime on day 1 of each 42 day cycle. 2 capsule 0    LORazepam (ATIVAN) 0.5 MG tablet Take 0.5 mg by mouth daily as needed.      metoprolol tartrate (LOPRESSOR) 25 MG tablet Take 1 tablet (25 mg total) by mouth 2 (two) times daily. 60 tablet 5    ondansetron (ZOFRAN) 8 MG tablet Take 8 mg by mouth every 8 (eight) hours as needed.      prochlorperazine (COMPAZINE) 10 MG tablet Take 10 mg by mouth once.      promethazine (PHENERGAN) 12.5 MG Tab Take 12.5 mg by mouth every 4 (four) hours as needed.      traMADoL (ULTRAM) 50 mg tablet Take 50 mg by mouth.      amoxicillin-clavulanate 875-125mg (AUGMENTIN) 875-125 mg per tablet Take 1 tablet by  "mouth every 12 (twelve) hours. 20 tablet 0     Current Facility-Administered Medications on File Prior to Visit   Medication Dose Route Frequency Provider Last Rate Last Admin    mupirocin 2 % ointment   Nasal On Call Procedure Cesar Mayberry MD   Given at 01/04/24 1058         Objective:     Vital Signs (Most Recent)  Pulse: 75 (11/20/24 1121)  BP: 126/84 (11/20/24 1121)    ROS A 10+ review of systems was performed with pertinent positives and negatives noted above in the history of present illness.  Other systems were negative unless otherwise specified.    Physical Exam:  Gen: awake, alert, in no acute distress  HEENT: normocephalic, atraumatic, EOMI, no scleral icterus  CV: regular rate and rhythm  Pulm: equal chest rise bilaterally, normal work of breathing  Abd:  soft, non-tender, no guarding  Ext: WWP, skin warm and dry    Imaging  The following imaging was reviewed:   Esophagram on 10/29: "Dilated caliber esophagus with distal tapering at the gastroesophageal junction suggestive of achalasia."    EGD on 8/12:  - Dilation in the entire esophagus.                          - Esophageal plaques were found, consistent with                          candidiasis.                          - The esophageal examination was consistent with                          achalasia. Dilated.                          - Esophagogastric landmarks identified.                          - Normal stomach.                          - Normal examined duodenum.                          - Biopsies were taken with a cold forceps for                          histology in the proximal esophagus and in the                          distal esophagus.       Assessment:     Sukumar Wiseman is a 39 y.o. male with pertinent past medical history of cerebellar glioma who presents with diagnosis of achalasia. He is currently scheduled for an EGD with botox. Patient was previously discussed at Swallow conference. We discussed with patient that the " success rate of EGD with botox is very high however the problem is it likely will only last 3-6 months. We discussed that we feel it would be best to proceed with this as within the next 3-6 months we can reassess if surgical intervention would be a possibility with the chemo regimen at that time and immunosuppression. Patient expressed understanding and was agreeable with plan.     Plan:     - EGD with botox scheduled with GI on 11/25  - RTC if symptoms do not improve with botox intervention  - Will have continued discussions with care team members to determine best intervention for patient       Juan Pablo Ruvalcaba,   PGY-1    I have personally taken the history and examined this patient and agree with the resident's note as stated above.         Linden Garcia MD

## 2024-11-22 ENCOUNTER — LAB VISIT (OUTPATIENT)
Dept: LAB | Facility: HOSPITAL | Age: 39
End: 2024-11-22
Attending: PSYCHIATRY & NEUROLOGY
Payer: COMMERCIAL

## 2024-11-22 DIAGNOSIS — C71.9 DIFFUSE MIDLINE GLIOMA, H3 K27M MUTANT: ICD-10-CM

## 2024-11-22 LAB
ALBUMIN SERPL BCP-MCNC: 3.6 G/DL (ref 3.5–5.2)
ALP SERPL-CCNC: 69 U/L (ref 55–135)
ALT SERPL W/O P-5'-P-CCNC: 44 U/L (ref 10–44)
ANION GAP SERPL CALC-SCNC: 4 MMOL/L (ref 3–11)
AST SERPL-CCNC: 19 U/L (ref 10–40)
BASOPHILS # BLD AUTO: 0.01 K/UL (ref 0–0.2)
BASOPHILS NFR BLD: 0.2 % (ref 0–1.9)
BILIRUB SERPL-MCNC: 0.7 MG/DL (ref 0.1–1)
BUN SERPL-MCNC: 4 MG/DL (ref 6–20)
CALCIUM SERPL-MCNC: 9.1 MG/DL (ref 8.7–10.5)
CHLORIDE SERPL-SCNC: 106 MMOL/L (ref 95–110)
CO2 SERPL-SCNC: 32 MMOL/L (ref 23–29)
CREAT SERPL-MCNC: 1.1 MG/DL (ref 0.5–1.4)
DIFFERENTIAL METHOD BLD: ABNORMAL
EOSINOPHIL # BLD AUTO: 0.1 K/UL (ref 0–0.5)
EOSINOPHIL NFR BLD: 1.2 % (ref 0–8)
ERYTHROCYTE [DISTWIDTH] IN BLOOD BY AUTOMATED COUNT: 12.1 % (ref 11.5–14.5)
EST. GFR  (NO RACE VARIABLE): >60 ML/MIN/1.73 M^2
GLUCOSE SERPL-MCNC: 73 MG/DL (ref 70–110)
HCT VFR BLD AUTO: 40.6 % (ref 40–54)
HGB BLD-MCNC: 14 G/DL (ref 14–18)
IMM GRANULOCYTES # BLD AUTO: 0 K/UL (ref 0–0.04)
IMM GRANULOCYTES NFR BLD AUTO: 0 % (ref 0–0.5)
LYMPHOCYTES # BLD AUTO: 1.2 K/UL (ref 1–4.8)
LYMPHOCYTES NFR BLD: 28.1 % (ref 18–48)
MCH RBC QN AUTO: 32.5 PG (ref 27–31)
MCHC RBC AUTO-ENTMCNC: 34.5 G/DL (ref 32–36)
MCV RBC AUTO: 94 FL (ref 82–98)
MONOCYTES # BLD AUTO: 0.6 K/UL (ref 0.3–1)
MONOCYTES NFR BLD: 14.7 % (ref 4–15)
NEUTROPHILS # BLD AUTO: 2.3 K/UL (ref 1.8–7.7)
NEUTROPHILS NFR BLD: 55.8 % (ref 38–73)
NRBC BLD-RTO: 0 /100 WBC
PLATELET # BLD AUTO: 202 K/UL (ref 150–450)
PMV BLD AUTO: 8 FL (ref 9.2–12.9)
POTASSIUM SERPL-SCNC: 3.9 MMOL/L (ref 3.5–5.1)
PROT SERPL-MCNC: 7.2 G/DL (ref 6–8.4)
RBC # BLD AUTO: 4.31 M/UL (ref 4.6–6.2)
SODIUM SERPL-SCNC: 142 MMOL/L (ref 136–145)
WBC # BLD AUTO: 4.16 K/UL (ref 3.9–12.7)

## 2024-11-22 PROCEDURE — 36415 COLL VENOUS BLD VENIPUNCTURE: CPT | Performed by: PSYCHIATRY & NEUROLOGY

## 2024-11-22 PROCEDURE — 80053 COMPREHEN METABOLIC PANEL: CPT | Performed by: PSYCHIATRY & NEUROLOGY

## 2024-11-22 PROCEDURE — 85025 COMPLETE CBC W/AUTO DIFF WBC: CPT | Performed by: PSYCHIATRY & NEUROLOGY

## 2024-11-25 ENCOUNTER — ANESTHESIA EVENT (OUTPATIENT)
Dept: ENDOSCOPY | Facility: HOSPITAL | Age: 39
End: 2024-11-25
Payer: COMMERCIAL

## 2024-11-25 ENCOUNTER — HOSPITAL ENCOUNTER (OUTPATIENT)
Facility: HOSPITAL | Age: 39
Discharge: HOME | End: 2024-11-25
Attending: INTERNAL MEDICINE | Admitting: INTERNAL MEDICINE
Payer: COMMERCIAL

## 2024-11-25 ENCOUNTER — ANESTHESIA (OUTPATIENT)
Dept: ENDOSCOPY | Facility: HOSPITAL | Age: 39
End: 2024-11-25
Payer: COMMERCIAL

## 2024-11-25 VITALS
OXYGEN SATURATION: 99 % | TEMPERATURE: 98 F | DIASTOLIC BLOOD PRESSURE: 80 MMHG | HEART RATE: 59 BPM | SYSTOLIC BLOOD PRESSURE: 124 MMHG | HEIGHT: 71 IN | RESPIRATION RATE: 19 BRPM | BODY MASS INDEX: 27.3 KG/M2 | WEIGHT: 195 LBS

## 2024-11-25 DIAGNOSIS — K22.0 ACHALASIA: ICD-10-CM

## 2024-11-25 DIAGNOSIS — R13.19 ESOPHAGEAL DYSPHAGIA: Primary | ICD-10-CM

## 2024-11-25 PROCEDURE — 37000008 HC ANESTHESIA 1ST 15 MINUTES: Performed by: INTERNAL MEDICINE

## 2024-11-25 PROCEDURE — 63600175 PHARM REV CODE 636 W HCPCS: Performed by: NURSE ANESTHETIST, CERTIFIED REGISTERED

## 2024-11-25 PROCEDURE — 27201012 HC FORCEPS, HOT/COLD, DISP: Performed by: INTERNAL MEDICINE

## 2024-11-25 PROCEDURE — 88305 TISSUE EXAM BY PATHOLOGIST: CPT | Performed by: PATHOLOGY

## 2024-11-25 PROCEDURE — 37000009 HC ANESTHESIA EA ADD 15 MINS: Performed by: INTERNAL MEDICINE

## 2024-11-25 PROCEDURE — 91040 ESOPH BALLOON DISTENSION TST: CPT | Mod: TC | Performed by: INTERNAL MEDICINE

## 2024-11-25 PROCEDURE — 25000003 PHARM REV CODE 250: Performed by: ANESTHESIOLOGY

## 2024-11-25 PROCEDURE — C1726 CATH, BAL DIL, NON-VASCULAR: HCPCS | Performed by: INTERNAL MEDICINE

## 2024-11-25 PROCEDURE — 63600175 PHARM REV CODE 636 W HCPCS: Mod: JZ,JG | Performed by: INTERNAL MEDICINE

## 2024-11-25 PROCEDURE — 43239 EGD BIOPSY SINGLE/MULTIPLE: CPT | Performed by: INTERNAL MEDICINE

## 2024-11-25 PROCEDURE — 91040 ESOPH BALLOON DISTENSION TST: CPT | Mod: 26,,, | Performed by: INTERNAL MEDICINE

## 2024-11-25 PROCEDURE — 43236 UPPR GI SCOPE W/SUBMUC INJ: CPT | Mod: 59,,, | Performed by: INTERNAL MEDICINE

## 2024-11-25 PROCEDURE — 27201028 HC NEEDLE, SCLERO: Performed by: INTERNAL MEDICINE

## 2024-11-25 PROCEDURE — 43236 UPPR GI SCOPE W/SUBMUC INJ: CPT | Mod: 59 | Performed by: INTERNAL MEDICINE

## 2024-11-25 PROCEDURE — 43239 EGD BIOPSY SINGLE/MULTIPLE: CPT | Mod: ,,, | Performed by: INTERNAL MEDICINE

## 2024-11-25 RX ORDER — TRAMADOL HYDROCHLORIDE 50 MG/1
50 TABLET ORAL ONCE AS NEEDED
Status: COMPLETED | OUTPATIENT
Start: 2024-11-25 | End: 2024-11-25

## 2024-11-25 RX ORDER — SODIUM CHLORIDE 0.9 % (FLUSH) 0.9 %
10 SYRINGE (ML) INJECTION
Status: DISCONTINUED | OUTPATIENT
Start: 2024-11-25 | End: 2024-11-25 | Stop reason: HOSPADM

## 2024-11-25 RX ORDER — GLUCAGON 1 MG
1 KIT INJECTION
Status: DISCONTINUED | OUTPATIENT
Start: 2024-11-25 | End: 2024-11-25 | Stop reason: HOSPADM

## 2024-11-25 RX ORDER — PROPOFOL 10 MG/ML
VIAL (ML) INTRAVENOUS
Status: DISCONTINUED | OUTPATIENT
Start: 2024-11-25 | End: 2024-11-25

## 2024-11-25 RX ORDER — SCOLOPAMINE TRANSDERMAL SYSTEM 1 MG/1
1 PATCH, EXTENDED RELEASE TRANSDERMAL
Status: DISCONTINUED | OUTPATIENT
Start: 2024-11-25 | End: 2024-11-25 | Stop reason: HOSPADM

## 2024-11-25 RX ORDER — HYDROMORPHONE HYDROCHLORIDE 1 MG/ML
0.2 INJECTION, SOLUTION INTRAMUSCULAR; INTRAVENOUS; SUBCUTANEOUS EVERY 5 MIN PRN
Status: DISCONTINUED | OUTPATIENT
Start: 2024-11-25 | End: 2024-11-25 | Stop reason: HOSPADM

## 2024-11-25 RX ORDER — SODIUM CHLORIDE 9 MG/ML
INJECTION, SOLUTION INTRAVENOUS CONTINUOUS
Status: DISCONTINUED | OUTPATIENT
Start: 2024-11-25 | End: 2024-11-25 | Stop reason: HOSPADM

## 2024-11-25 RX ADMIN — TRAMADOL HYDROCHLORIDE 50 MG: 50 TABLET, COATED ORAL at 11:11

## 2024-11-25 RX ADMIN — PROPOFOL 100 MG: 10 INJECTION, EMULSION INTRAVENOUS at 09:11

## 2024-11-25 RX ADMIN — PROPOFOL 200 MCG/KG/MIN: 10 INJECTION, EMULSION INTRAVENOUS at 09:11

## 2024-11-25 NOTE — H&P
Short Stay Endoscopy History and Physical    PCP - Larry Nicholas Jr., MD     Procedure - EGD with Endoflip and Botox  ASA - per anesthesia  Mallampati - per anesthesia  History of Anesthesia problems - no  Family history Anesthesia problems -  no   Plan of anesthesia - General    HPI:  This is a 39 y.o. male here for evaluation of : achalasia          ROS:  Constitutional: No fevers, chills, No weight loss  CV: No chest pain  Pulm: No cough, No shortness of breath  Ophtho: No vision changes  GI: see HPI  Derm: No rash    Medical History:  has a past medical history of Cancer (12/21/2023), Headache, Hypertension, and SVT (supraventricular tachycardia).    Surgical History:  has a past surgical history that includes Suboccipital craniotomy (Right, 01/04/2024); Esophagogastroduodenoscopy (N/A, 8/7/2024); Esophagogastroduodenoscopy (N/A, 8/12/2024); and Esophageal manometry with measurement of impedance (N/A, 8/12/2024).    Family History: family history includes Cancer in his maternal grandfather; Colon cancer in his maternal grandfather; Hyperlipidemia in his mother; Hypertension in his father and mother.. Otherwise no colon cancer, inflammatory bowel disease, or GI malignancies.    Social History:  reports that he has never smoked. He has never been exposed to tobacco smoke. He has never used smokeless tobacco. He reports that he does not currently use alcohol. He reports that he does not use drugs.    Review of patient's allergies indicates:  No Known Allergies    Medications:   Medications Prior to Admission   Medication Sig Dispense Refill Last Dose/Taking    acetaminophen (TYLENOL) 500 MG tablet Take 500 mg by mouth every 4 (four) hours as needed.       amoxicillin-clavulanate 875-125mg (AUGMENTIN) 875-125 mg per tablet Take 1 tablet by mouth every 12 (twelve) hours. 20 tablet 0     dexAMETHasone (DECADRON) 4 MG Tab --a single 2mg tablet every 6 hours for 3 days --a single 2mg tablet every 8 hours for 3  days --a single 2mg tablet every 12 hours for 4 days --a single 2mg tablet every morning for 4 days (Patient taking differently: --a single 2mg tablet every 6 hours for 3 days --a single 2mg tablet every 8 hours for 3 days --a single 2mg tablet every 12 hours for 4 days --a single 2mg tablet every morning for 4 days    Pt taking once daily 4mg as of 1.24.24) 33 tablet 0     EScitalopram oxalate (LEXAPRO) 10 MG tablet Take 1 tablet (10 mg total) by mouth once daily. 30 tablet 11     lomustine (GLEOSTINE) 100 MG capsule Take 200 mg (2 capsules) by mouth at bedtime on day 1 of each 42 day cycle. 2 capsule 0     LORazepam (ATIVAN) 0.5 MG tablet Take 0.5 mg by mouth daily as needed.       metoprolol tartrate (LOPRESSOR) 25 MG tablet Take 1 tablet (25 mg total) by mouth 2 (two) times daily. 60 tablet 5     ondansetron (ZOFRAN) 8 MG tablet Take 8 mg by mouth every 8 (eight) hours as needed.       prochlorperazine (COMPAZINE) 10 MG tablet Take 10 mg by mouth once.       promethazine (PHENERGAN) 12.5 MG Tab Take 12.5 mg by mouth every 4 (four) hours as needed.       traMADoL (ULTRAM) 50 mg tablet Take 50 mg by mouth.          Physical Exam:    Vital Signs: There were no vitals filed for this visit.    Gen: NAD, lying comfortably  HENT: NCAT, oropharynx clear  Eyes: anicteric sclerae, EOMI grossly  Neck: supple, no visible masses/goiter  Cardiac: RRR  Lungs: non-labored breathing  Abd: soft, NT/ND, normoactive BS  Ext: no LE edema, warm, well perfused  Skin: skin intact on exposed body parts, no visible rashes, lesions  Neuro: A&Ox4, neuro exam grossly intact, moves all extremities  Psych: appropriate mood, affect      Labs:  Lab Results   Component Value Date    WBC 4.16 11/22/2024    HGB 14.0 11/22/2024    HCT 40.6 11/22/2024     11/22/2024    CHOL 191 01/04/2024    TRIG 95 01/04/2024    HDL 34 (L) 01/04/2024    ALT 44 11/22/2024    AST 19 11/22/2024     11/22/2024    K 3.9 11/22/2024     11/22/2024     CREATININE 1.1 11/22/2024    BUN 4 (L) 11/22/2024    CO2 32 (H) 11/22/2024    TSH 0.539 01/04/2024    INR 1.0 01/04/2024    HGBA1C 5.2 01/04/2024       Plan:  EGD with Endoflip and botox for achalasia    I have explained the risks and benefits of endoscopy procedures to the patient including but not limited to bleeding, perforation, infection, and death.  The patient was asked if they understand and allowed to ask any further questions to their satisfaction.      Krissy Burgos MD

## 2024-11-25 NOTE — PLAN OF CARE
Pt meets criteria for discharge. VS WNL; respirations even and unlabored. No signs of acute distress. IV removed; pt tolerated well. D/c instructions reviewed w/ pt and spouse. Both verbalized understanding. No questions at this time. Pt transferred off unit to private vehicle via wheelchair accompanied by his spouse

## 2024-11-25 NOTE — ANESTHESIA POSTPROCEDURE EVALUATION
Anesthesia Post Evaluation    Patient: Sukumar Wiseman    Procedure(s) Performed: Procedure(s) (LRB):  EGD (ESOPHAGOGASTRODUODENOSCOPY) (N/A)    Final Anesthesia Type: general      Patient location during evaluation: GI PACU  Patient participation: Yes- Able to Participate  Level of consciousness: awake and alert  Post-procedure vital signs: reviewed and stable  Pain management: adequate  Airway patency: patent    PONV status at discharge: No PONV  Anesthetic complications: no      Cardiovascular status: blood pressure returned to baseline  Respiratory status: spontaneous ventilation  Hydration status: euvolemic  Follow-up not needed.              Vitals Value Taken Time   /66 11/25/24 1016   Temp 36.6 °C (97.9 °F) 11/25/24 1015   Pulse 61 11/25/24 1028   Resp 16 11/25/24 1028   SpO2 100 % 11/25/24 1028   Vitals shown include unfiled device data.      No case tracking events are documented in the log.      Pain/Zoltan Score: Zoltan Score: 5 (11/25/2024 10:15 AM)

## 2024-11-25 NOTE — TRANSFER OF CARE
"Anesthesia Transfer of Care Note    Patient: Sukumar Wiseman    Procedure(s) Performed: Procedure(s) (LRB):  EGD (ESOPHAGOGASTRODUODENOSCOPY) (N/A)    Patient location: [facility]    Anesthesia Type: general    Transport from OR: Transported from OR on 2-3 L/min O2 by NC with adequate spontaneous ventilation    Post pain: adequate analgesia    Post assessment: no apparent anesthetic complications    Post vital signs: stable    Level of consciousness: awake    Nausea/Vomiting: no nausea/vomiting    Complications: none    Transfer of care protocol was followed      Last vitals: Visit Vitals  /66 (BP Location: Left arm, Patient Position: Lying)   Pulse (!) 59   Temp 36.6 °C (97.9 °F) (Temporal)   Resp 13   Ht 5' 11" (1.803 m)   Wt 88.5 kg (195 lb)   SpO2 97%   BMI 27.20 kg/m²     "

## 2024-11-25 NOTE — PROVATION PATIENT INSTRUCTIONS
Discharge Summary/Instructions after an Endoscopic Procedure  Patient Name: Sukumar Wiseman  Patient MRN: [id number]  Patient YOB: 1985 Monday, November 25, 2024  Krissy Burgos MD  Dear patient,  As a result of recent federal legislation (The Federal Cures Act), you may   receive lab or pathology results from your procedure in your [facility]   account before your physician is able to contact you. Your physician or   their representative will relay the results to you with their   recommendations at their soonest availability.  Thank you,  RESTRICTIONS:  During your procedure today, you received medications for sedation.  These   medications may affect your judgment, balance and coordination.  Therefore,   for 24 hours, you have the following restrictions:   - DO NOT drive a car, operate machinery, make legal/financial decisions,   sign important papers or drink alcohol.    ACTIVITY:  Today: no heavy lifting, straining or running due to procedural   sedation/anesthesia.  The following day: return to full activity including work.  DIET:  Eat and drink normally unless instructed otherwise.     TREATMENT FOR COMMON SIDE EFFECTS:  - Mild abdominal pain, nausea, belching, bloating or excessive gas:  rest,   eat lightly and use a heating pad.  - Sore Throat: treat with throat lozenges and/or gargle with warm salt   water.  - Because air was used during the procedure, expelling large amounts of air   from your rectum or belching is normal.  - If a bowel prep was taken, you may not have a bowel movement for 1-3 days.    This is normal.  SYMPTOMS TO WATCH FOR AND REPORT TO YOUR PHYSICIAN:  1. Abdominal pain or bloating, other than gas cramps.  2. Chest pain.  3. Back pain.  4. Signs of infection such as: chills or fever occurring within 24 hours   after the procedure.  5. Rectal bleeding, which would show as bright red, maroon, or black stools.   (A tablespoon of blood from the rectum is not serious, especially if    hemorrhoids are present.)  6. Vomiting.  7. Weakness or dizziness.  GO DIRECTLY TO THE NEAREST EMERGENCY ROOM IF YOU HAVE ANY OF THE FOLLOWING:      Difficulty breathing              Chills and/or fever over 101 F   Persistent vomiting and/or vomiting blood   Severe abdominal pain   Severe chest pain   Black, tarry stools   Bleeding- more than one tablespoon   Any other symptom or condition that you feel may need urgent attention  Your doctor recommends these additional instructions:  If any biopsies were taken, your doctors clinic will contact you in 1 to 2   weeks with any results.  - Discharge patient to home.   - Resume previous diet.   - Continue present medications.   - Await pathology results.  For questions, problems or results please call your physician - Krissy Burgos MD at Work:  [phone].  [facility] [place], EMERGENCY ROOM PHONE NUMBER: [phone]  IF A COMPLICATION OR EMERGENCY SITUATION ARISES AND YOU ARE UNABLE TO REACH   YOUR PHYSICIAN - GO DIRECTLY TO THE EMERGENCY ROOM.  Krissy Burgos MD  11/25/2024 10:12:31 AM  This report has been verified and signed electronically.  Dear patient,  As a result of recent federal legislation (The Federal Cures Act), you may   receive lab or pathology results from your procedure in your [facility]   account before your physician is able to contact you. Your physician or   their representative will relay the results to you with their   recommendations at their soonest availability.  Thank you,  [employer]

## 2024-11-27 LAB
FINAL PATHOLOGIC DIAGNOSIS: NORMAL
GROSS: NORMAL
Lab: NORMAL

## 2024-11-29 ENCOUNTER — LAB VISIT (OUTPATIENT)
Dept: LAB | Facility: HOSPITAL | Age: 39
End: 2024-11-29
Attending: PSYCHIATRY & NEUROLOGY
Payer: COMMERCIAL

## 2024-11-29 DIAGNOSIS — C71.9 DIFFUSE MIDLINE GLIOMA, H3 K27M MUTANT: ICD-10-CM

## 2024-11-29 LAB
ALBUMIN SERPL BCP-MCNC: 3.6 G/DL (ref 3.5–5.2)
ALP SERPL-CCNC: 68 U/L (ref 55–135)
ALT SERPL W/O P-5'-P-CCNC: 45 U/L (ref 10–44)
ANION GAP SERPL CALC-SCNC: 6 MMOL/L (ref 3–11)
AST SERPL-CCNC: 17 U/L (ref 10–40)
BASOPHILS # BLD AUTO: 0.01 K/UL (ref 0–0.2)
BASOPHILS NFR BLD: 0.3 % (ref 0–1.9)
BILIRUB SERPL-MCNC: 0.7 MG/DL (ref 0.1–1)
BUN SERPL-MCNC: 6 MG/DL (ref 6–20)
CALCIUM SERPL-MCNC: 9 MG/DL (ref 8.7–10.5)
CHLORIDE SERPL-SCNC: 105 MMOL/L (ref 95–110)
CO2 SERPL-SCNC: 34 MMOL/L (ref 23–29)
CREAT SERPL-MCNC: 1.1 MG/DL (ref 0.5–1.4)
DIFFERENTIAL METHOD BLD: ABNORMAL
EOSINOPHIL # BLD AUTO: 0 K/UL (ref 0–0.5)
EOSINOPHIL NFR BLD: 1.1 % (ref 0–8)
ERYTHROCYTE [DISTWIDTH] IN BLOOD BY AUTOMATED COUNT: 12 % (ref 11.5–14.5)
EST. GFR  (NO RACE VARIABLE): >60 ML/MIN/1.73 M^2
GLUCOSE SERPL-MCNC: 99 MG/DL (ref 70–110)
HCT VFR BLD AUTO: 41 % (ref 40–54)
HGB BLD-MCNC: 13.6 G/DL (ref 14–18)
IMM GRANULOCYTES # BLD AUTO: 0 K/UL (ref 0–0.04)
IMM GRANULOCYTES NFR BLD AUTO: 0 % (ref 0–0.5)
LYMPHOCYTES # BLD AUTO: 0.7 K/UL (ref 1–4.8)
LYMPHOCYTES NFR BLD: 19.6 % (ref 18–48)
MCH RBC QN AUTO: 32.1 PG (ref 27–31)
MCHC RBC AUTO-ENTMCNC: 33.2 G/DL (ref 32–36)
MCV RBC AUTO: 97 FL (ref 82–98)
MONOCYTES # BLD AUTO: 0.6 K/UL (ref 0.3–1)
MONOCYTES NFR BLD: 15 % (ref 4–15)
NEUTROPHILS # BLD AUTO: 2.4 K/UL (ref 1.8–7.7)
NEUTROPHILS NFR BLD: 64 % (ref 38–73)
NRBC BLD-RTO: 0 /100 WBC
PLATELET # BLD AUTO: 191 K/UL (ref 150–450)
PMV BLD AUTO: 8.5 FL (ref 9.2–12.9)
POTASSIUM SERPL-SCNC: 3.8 MMOL/L (ref 3.5–5.1)
PROT SERPL-MCNC: 6.9 G/DL (ref 6–8.4)
RBC # BLD AUTO: 4.24 M/UL (ref 4.6–6.2)
SODIUM SERPL-SCNC: 145 MMOL/L (ref 136–145)
WBC # BLD AUTO: 3.67 K/UL (ref 3.9–12.7)

## 2024-11-29 PROCEDURE — 80053 COMPREHEN METABOLIC PANEL: CPT | Performed by: PSYCHIATRY & NEUROLOGY

## 2024-11-29 PROCEDURE — 85025 COMPLETE CBC W/AUTO DIFF WBC: CPT | Performed by: PSYCHIATRY & NEUROLOGY

## 2024-11-29 PROCEDURE — 36415 COLL VENOUS BLD VENIPUNCTURE: CPT | Performed by: PSYCHIATRY & NEUROLOGY

## 2024-12-06 ENCOUNTER — LAB VISIT (OUTPATIENT)
Dept: LAB | Facility: HOSPITAL | Age: 39
End: 2024-12-06
Attending: PSYCHIATRY & NEUROLOGY
Payer: COMMERCIAL

## 2024-12-06 DIAGNOSIS — C71.9 DIFFUSE MIDLINE GLIOMA, H3 K27M MUTANT: ICD-10-CM

## 2024-12-06 LAB
ALBUMIN SERPL BCP-MCNC: 3.7 G/DL (ref 3.5–5.2)
ALP SERPL-CCNC: 74 U/L (ref 55–135)
ALT SERPL W/O P-5'-P-CCNC: 56 U/L (ref 10–44)
ANION GAP SERPL CALC-SCNC: 5 MMOL/L (ref 3–11)
AST SERPL-CCNC: 21 U/L (ref 10–40)
BASOPHILS # BLD AUTO: 0.01 K/UL (ref 0–0.2)
BASOPHILS NFR BLD: 0.2 % (ref 0–1.9)
BILIRUB SERPL-MCNC: 0.9 MG/DL (ref 0.1–1)
BUN SERPL-MCNC: 12 MG/DL (ref 6–20)
CALCIUM SERPL-MCNC: 8.7 MG/DL (ref 8.7–10.5)
CHLORIDE SERPL-SCNC: 106 MMOL/L (ref 95–110)
CO2 SERPL-SCNC: 33 MMOL/L (ref 23–29)
CREAT SERPL-MCNC: 1.2 MG/DL (ref 0.5–1.4)
DIFFERENTIAL METHOD BLD: ABNORMAL
EOSINOPHIL # BLD AUTO: 0 K/UL (ref 0–0.5)
EOSINOPHIL NFR BLD: 0.5 % (ref 0–8)
ERYTHROCYTE [DISTWIDTH] IN BLOOD BY AUTOMATED COUNT: 11.9 % (ref 11.5–14.5)
EST. GFR  (NO RACE VARIABLE): >60 ML/MIN/1.73 M^2
GLUCOSE SERPL-MCNC: 151 MG/DL (ref 70–110)
HCT VFR BLD AUTO: 41.4 % (ref 40–54)
HGB BLD-MCNC: 13.9 G/DL (ref 14–18)
IMM GRANULOCYTES # BLD AUTO: 0.02 K/UL (ref 0–0.04)
IMM GRANULOCYTES NFR BLD AUTO: 0.4 % (ref 0–0.5)
LYMPHOCYTES # BLD AUTO: 0.9 K/UL (ref 1–4.8)
LYMPHOCYTES NFR BLD: 15.9 % (ref 18–48)
MCH RBC QN AUTO: 31.8 PG (ref 27–31)
MCHC RBC AUTO-ENTMCNC: 33.6 G/DL (ref 32–36)
MCV RBC AUTO: 95 FL (ref 82–98)
MONOCYTES # BLD AUTO: 0.4 K/UL (ref 0.3–1)
MONOCYTES NFR BLD: 6.8 % (ref 4–15)
NEUTROPHILS # BLD AUTO: 4.3 K/UL (ref 1.8–7.7)
NEUTROPHILS NFR BLD: 76.2 % (ref 38–73)
NRBC BLD-RTO: 0 /100 WBC
PLATELET # BLD AUTO: 191 K/UL (ref 150–450)
PMV BLD AUTO: 8 FL (ref 9.2–12.9)
POTASSIUM SERPL-SCNC: 4.4 MMOL/L (ref 3.5–5.1)
PROT SERPL-MCNC: 6.9 G/DL (ref 6–8.4)
RBC # BLD AUTO: 4.37 M/UL (ref 4.6–6.2)
SODIUM SERPL-SCNC: 144 MMOL/L (ref 136–145)
WBC # BLD AUTO: 5.6 K/UL (ref 3.9–12.7)

## 2024-12-06 PROCEDURE — 85025 COMPLETE CBC W/AUTO DIFF WBC: CPT | Performed by: PSYCHIATRY & NEUROLOGY

## 2024-12-06 PROCEDURE — 80053 COMPREHEN METABOLIC PANEL: CPT | Performed by: PSYCHIATRY & NEUROLOGY

## 2024-12-06 PROCEDURE — 36415 COLL VENOUS BLD VENIPUNCTURE: CPT | Performed by: PSYCHIATRY & NEUROLOGY

## 2024-12-10 ENCOUNTER — PATIENT MESSAGE (OUTPATIENT)
Dept: SURGERY | Facility: CLINIC | Age: 39
End: 2024-12-10
Payer: COMMERCIAL

## 2024-12-13 ENCOUNTER — LAB VISIT (OUTPATIENT)
Dept: LAB | Facility: HOSPITAL | Age: 39
End: 2024-12-13
Attending: PSYCHIATRY & NEUROLOGY
Payer: COMMERCIAL

## 2024-12-13 DIAGNOSIS — C71.9 DIFFUSE MIDLINE GLIOMA, H3 K27M MUTANT: Primary | ICD-10-CM

## 2024-12-13 LAB
ALBUMIN SERPL BCP-MCNC: 3.8 G/DL (ref 3.5–5.2)
ALP SERPL-CCNC: 74 U/L (ref 55–135)
ALT SERPL W/O P-5'-P-CCNC: 69 U/L (ref 10–44)
ANION GAP SERPL CALC-SCNC: 5 MMOL/L (ref 3–11)
AST SERPL-CCNC: 26 U/L (ref 10–40)
BASOPHILS # BLD AUTO: 0 K/UL (ref 0–0.2)
BASOPHILS NFR BLD: 0 % (ref 0–1.9)
BILIRUB SERPL-MCNC: 0.9 MG/DL (ref 0.1–1)
BUN SERPL-MCNC: 7 MG/DL (ref 6–20)
CALCIUM SERPL-MCNC: 9 MG/DL (ref 8.7–10.5)
CHLORIDE SERPL-SCNC: 104 MMOL/L (ref 95–110)
CO2 SERPL-SCNC: 33 MMOL/L (ref 23–29)
CREAT SERPL-MCNC: 1 MG/DL (ref 0.5–1.4)
DIFFERENTIAL METHOD BLD: ABNORMAL
EOSINOPHIL # BLD AUTO: 0 K/UL (ref 0–0.5)
EOSINOPHIL NFR BLD: 0.6 % (ref 0–8)
ERYTHROCYTE [DISTWIDTH] IN BLOOD BY AUTOMATED COUNT: 12.2 % (ref 11.5–14.5)
EST. GFR  (NO RACE VARIABLE): >60 ML/MIN/1.73 M^2
GLUCOSE SERPL-MCNC: 109 MG/DL (ref 70–110)
HCT VFR BLD AUTO: 41 % (ref 40–54)
HGB BLD-MCNC: 13.7 G/DL (ref 14–18)
IMM GRANULOCYTES # BLD AUTO: 0.01 K/UL (ref 0–0.04)
IMM GRANULOCYTES NFR BLD AUTO: 0.3 % (ref 0–0.5)
LYMPHOCYTES # BLD AUTO: 0.8 K/UL (ref 1–4.8)
LYMPHOCYTES NFR BLD: 24.3 % (ref 18–48)
MCH RBC QN AUTO: 31.9 PG (ref 27–31)
MCHC RBC AUTO-ENTMCNC: 33.4 G/DL (ref 32–36)
MCV RBC AUTO: 96 FL (ref 82–98)
MONOCYTES # BLD AUTO: 0.5 K/UL (ref 0.3–1)
MONOCYTES NFR BLD: 15 % (ref 4–15)
NEUTROPHILS # BLD AUTO: 2 K/UL (ref 1.8–7.7)
NEUTROPHILS NFR BLD: 59.8 % (ref 38–73)
NRBC BLD-RTO: 0 /100 WBC
PLATELET # BLD AUTO: 192 K/UL (ref 150–450)
PMV BLD AUTO: 8 FL (ref 9.2–12.9)
POTASSIUM SERPL-SCNC: 4 MMOL/L (ref 3.5–5.1)
PROT SERPL-MCNC: 7.1 G/DL (ref 6–8.4)
RBC # BLD AUTO: 4.29 M/UL (ref 4.6–6.2)
SODIUM SERPL-SCNC: 142 MMOL/L (ref 136–145)
WBC # BLD AUTO: 3.34 K/UL (ref 3.9–12.7)

## 2024-12-13 PROCEDURE — 80053 COMPREHEN METABOLIC PANEL: CPT | Performed by: PSYCHIATRY & NEUROLOGY

## 2024-12-13 PROCEDURE — 36415 COLL VENOUS BLD VENIPUNCTURE: CPT | Performed by: PSYCHIATRY & NEUROLOGY

## 2024-12-13 PROCEDURE — 85025 COMPLETE CBC W/AUTO DIFF WBC: CPT | Performed by: PSYCHIATRY & NEUROLOGY

## 2024-12-19 ENCOUNTER — TELEPHONE (OUTPATIENT)
Dept: NEUROLOGY | Facility: CLINIC | Age: 39
End: 2024-12-19

## 2024-12-19 ENCOUNTER — OFFICE VISIT (OUTPATIENT)
Dept: NEUROLOGY | Facility: CLINIC | Age: 39
End: 2024-12-19
Payer: COMMERCIAL

## 2024-12-19 DIAGNOSIS — C71.9 DIFFUSE MIDLINE GLIOMA, H3 K27M MUTANT: Primary | ICD-10-CM

## 2024-12-19 DIAGNOSIS — G93.6 CEREBRAL EDEMA: ICD-10-CM

## 2024-12-19 DIAGNOSIS — Z79.52 LONG TERM (CURRENT) USE OF SYSTEMIC STEROIDS: ICD-10-CM

## 2024-12-19 NOTE — TELEPHONE ENCOUNTER
Called patient to schedule labs, patient states that they normally just walk in for his weekly labs and will just have them to draw the cortisol labs with his normal weekly labs. Patient's wife also requested follow up appointment before 1/13/25.

## 2024-12-19 NOTE — PROGRESS NOTES
Subjective:       Patient ID: Sukumar Wiseman is a 39 y.o. male.    Chief Complaint: diffuse midline glioma, [id number]    Follow-up      Oncologic History  8/2023: began noticing worsening double vision  11/2023: began having imbalance, intermittent headaches  12/2023: MRI brain ordered by PCP for symptoms revealed a right cerebellar mass  1/4/2024: subtotal resection (Luis Wallace) of the mass with pathology consistent with high grade glioma; molecular studies confirmed diagnosis of diffuse midline glioma, [id number], WHO grade 4  2/15-3/27/2024: radiation to 60Gy over 30fx ([facility])  4/25/2024: started [id number]/placebo trial with [facility]  5/23/2024: MRI brain with POD  6/2024: C1 lomustine  7/16/2024: C2 lomustine  8/2024: C3 lomustine  10/8/2024: C4 lomustine  11/30/2024: C5 lomustine @90mg/m2 - felt a little off balance with this. Delayed slightly due to EGD with botox for achalasia    Sukumar presents today with his wife, who helps provide the history.     Going to [facility] at the end of the month for surveillance MRI and f/u.    Currently on dexamethasone 0.5mg daily. Has been on this dose for about 4-6 weeks.    Planning to return to work 1/13/25.    Swallowing is much improved after EGD/Botox procedure.    Past Medical History:   Diagnosis Date    Cancer 12/21/2023    Headache     Hypertension     145/93,  not medicated    SVT (supraventricular tachycardia)       Current Outpatient Medications   Medication Sig Dispense Refill    acetaminophen (TYLENOL) 500 MG tablet Take 500 mg by mouth every 4 (four) hours as needed.      dexAMETHasone (DECADRON) 4 MG Tab --a single 2mg tablet every 6 hours for 3 days --a single 2mg tablet every 8 hours for 3 days --a single 2mg tablet every 12 hours for 4 days --a single 2mg tablet every morning for 4 days (Patient taking differently: --a single 2mg tablet every 6 hours for 3 days --a single 2mg tablet every 8 hours for 3 days --a single 2mg tablet every 12 hours for 4 days --a  single 2mg tablet every morning for 4 days    Pt taking once daily 4mg as of 1.24.24) 33 tablet 0    EScitalopram oxalate (LEXAPRO) 10 MG tablet Take 1 tablet (10 mg total) by mouth once daily. 30 tablet 11    lomustine (GLEOSTINE) 100 MG capsule Take 200 mg (2 capsules) by mouth at bedtime on day 1 of each 42 day cycle. 2 capsule 0    LORazepam (ATIVAN) 0.5 MG tablet Take 0.5 mg by mouth daily as needed.      metoprolol tartrate (LOPRESSOR) 25 MG tablet Take 1 tablet (25 mg total) by mouth 2 (two) times daily. 60 tablet 5    ondansetron (ZOFRAN) 8 MG tablet Take 8 mg by mouth every 8 (eight) hours as needed.      prochlorperazine (COMPAZINE) 10 MG tablet Take 10 mg by mouth once.      promethazine (PHENERGAN) 12.5 MG Tab Take 12.5 mg by mouth every 4 (four) hours as needed.      traMADoL (ULTRAM) 50 mg tablet Take 50 mg by mouth.       No current facility-administered medications for this visit.     Facility-Administered Medications Ordered in Other Visits   Medication Dose Route Frequency Provider Last Rate Last Admin    mupirocin 2 % ointment   Nasal On Call Procedure Cesar Mayberry MD   Given at 01/04/24 1058      Past Surgical History:   Procedure Laterality Date    ESOPHAGEAL MANOMETRY WITH MEASUREMENT OF IMPEDANCE N/A 8/12/2024    Procedure: MANOMETRY, ESOPHAGUS, WITH IMPEDANCE MEASUREMENT;  Surgeon: Vaughn Ramsey MD;  Location: [address]);  Service: Endoscopy;  Laterality: N/A;    ESOPHAGOGASTRODUODENOSCOPY N/A 8/7/2024    Procedure: EGD (ESOPHAGOGASTRODUODENOSCOPY);  Surgeon: Gail Chandler MD;  Location: [facility];  Service: General;  Laterality: N/A;  with dilation  1st  @ 6:00  EGD ATTEMPTED; ABORTED    ESOPHAGOGASTRODUODENOSCOPY N/A 8/12/2024    Procedure: EGD (ESOPHAGOGASTRODUODENOSCOPY);  Surgeon: Vaughn Ramsey MD;  Location: [address]);  Service: Endoscopy;  Laterality: N/A;    ESOPHAGOGASTRODUODENOSCOPY N/A 11/25/2024    Procedure: EGD (ESOPHAGOGASTRODUODENOSCOPY);   Surgeon: Krissy Burgos MD;  Location: [facility] ENDO (2ND FLR);  Service: Endoscopy;  Laterality: N/A;  propofol only/ sper pt / prep inst sent to pt viaportal/endoflip/ botox injection/ 3 days full 1 day clear, 2nd floor-end stage achalasia  10/15 pt r/s instructions to pt portal.cf  10/19 r/s update instr to portal. ASam  11/18-spoke with wife-has johanna    SUBOCCIPITAL CRANIOTOMY Right 01/04/2024    Procedure: CRANIOTOMY, SUBOCCIPITAL MIS;  Surgeon: Luis Wallace MD;  Location: [facility] OR [address] [place] [address];  Service: Neurosurgery;  Laterality: Right;      Family History   Problem Relation Name Age of Onset    Hypertension Mother Clonia     Hyperlipidemia Mother Clonia     Hypertension Father      Colon cancer Maternal Grandfather Osman     Cancer Maternal Grandfather Osman       Social History     Tobacco Use    Smoking status: Never     Passive exposure: Never    Smokeless tobacco: Never   Substance Use Topics    Alcohol use: Not Currently    Drug use: Never   ; has two children ages 12 and 9       Review of Systems  KPS: 80      Objective:      There were no vitals filed for this visit.      NEUROLOGICAL EXAMINATION:     MENTAL STATUS   Attention: normal. Concentration: normal.   Speech: (scanning)  Level of consciousness: alert    CRANIAL NERVES     CN V   Facial sensation intact.     CN VII   Facial expression full, symmetric.     CN VIII   CN VIII normal.     CN IX, X   CN IX normal.   CN X normal.     CN XI   CN XI normal.     CN XII   CN XII normal.        Disconjugate gaze  Saccadic intrusions with finger pursuit  Left beating nystagmus at rest     MOTOR EXAM   Muscle bulk: normal    Strength   Strength 5/5 throughout.     REFLEXES     Reflexes   Right brachioradialis: 2+  Left brachioradialis: 2+  Right biceps: 2+  Left biceps: 2+  Right patellar: 3+  Left patellar: 2+  Right achilles: 3+  Left achilles: 2+    SENSORY EXAM   Light touch normal.     GAIT AND COORDINATION     Gait  Gait: wide-based      Coordination   Finger to nose coordination: abnormal (dysmetria on the right)       Per report, MRI of the brain from 11/12/2024 is stable. I have requested these images for my personal review.      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 male with a cerebellar diffuse midline glioma, [id number], WHO grade 4 s/p subtotal resection 1/4/2024, radiation to 60Gy completed 3/2024, [id number] vs placebo trial 4-5/2024, with POD on MRI from 5/2024 presenting for follow up and treatment recommendations. Tumor diagnosis was heralded by imbalance and headache. Current symptoms include imbalance, double vision, and scanning speech.    Diffuse midline glioma, [id number], WHO grade 4  Sukumar has started C5 lomustine, prescribed by his outside provider (Dr. Medina, [facility]).    We discussed monitoring for lomustine, which includes weekly CBC weeks 4-6 and CMP at week 6. He will obtain these locally weeks 4-5, and follow up with me week 6 for CBC/CMP. He has been getting MRI of the brain and prescription of lomustine through [facility].     He will follow up with me after his next MRI. I will request imaging for my personal review.    Cerebral edema  Continuing to slowly taper steroids. He has continued mild right sided facial/tongue numbness that is not bothersome enough to him to want to increase steroids. Continue to monitor.    He has been on dexamethasone 0.5mg for 4-6 weeks. Given length of steroid use, will check AM cortisol with labs tomorrow to ensure endogenous steroid levels are adequate for cessation of dexamethasone. If not adequate, continue 0.5mg daily for 4 more weeks, then re-check AM cortisol.              The patient location is: home in  [place]  The chief complaint leading to consultation is: diffuse midline glioma    Visit type: audiovisual - then converted to audio only due to technical difficulties    Face to Face time with patient: 20 minutes        Each patient to whom he or she provides medical services by telemedicine is:  (1) informed of the relationship between the physician and patient and the respective role of any other health care provider with respect to management of the patient; and (2) notified that he or she may decline to receive medical services by telemedicine and may withdraw from such care at any time.    Notes:       Karen Og MD  Department of Neurology  Neuro-Oncology Section

## 2024-12-20 ENCOUNTER — LAB VISIT (OUTPATIENT)
Dept: LAB | Facility: HOSPITAL | Age: 39
End: 2024-12-20
Attending: PSYCHIATRY & NEUROLOGY
Payer: COMMERCIAL

## 2024-12-20 DIAGNOSIS — Z79.52 LONG TERM (CURRENT) USE OF SYSTEMIC STEROIDS: ICD-10-CM

## 2024-12-20 DIAGNOSIS — C71.9 DIFFUSE MIDLINE GLIOMA, H3 K27M MUTANT: ICD-10-CM

## 2024-12-20 LAB
BASOPHILS # BLD AUTO: 0.01 K/UL (ref 0–0.2)
BASOPHILS NFR BLD: 0.3 % (ref 0–1.9)
CORTIS SERPL-MCNC: 4.5 UG/DL (ref 4.3–22.4)
DIFFERENTIAL METHOD BLD: ABNORMAL
EOSINOPHIL # BLD AUTO: 0 K/UL (ref 0–0.5)
EOSINOPHIL NFR BLD: 0.6 % (ref 0–8)
ERYTHROCYTE [DISTWIDTH] IN BLOOD BY AUTOMATED COUNT: 12.3 % (ref 11.5–14.5)
HCT VFR BLD AUTO: 40.5 % (ref 40–54)
HGB BLD-MCNC: 13.7 G/DL (ref 14–18)
IMM GRANULOCYTES # BLD AUTO: 0.01 K/UL (ref 0–0.04)
IMM GRANULOCYTES NFR BLD AUTO: 0.3 % (ref 0–0.5)
LYMPHOCYTES # BLD AUTO: 0.7 K/UL (ref 1–4.8)
LYMPHOCYTES NFR BLD: 21 % (ref 18–48)
MCH RBC QN AUTO: 32.1 PG (ref 27–31)
MCHC RBC AUTO-ENTMCNC: 33.8 G/DL (ref 32–36)
MCV RBC AUTO: 95 FL (ref 82–98)
MONOCYTES # BLD AUTO: 0.6 K/UL (ref 0.3–1)
MONOCYTES NFR BLD: 17.6 % (ref 4–15)
NEUTROPHILS # BLD AUTO: 2.1 K/UL (ref 1.8–7.7)
NEUTROPHILS NFR BLD: 60.2 % (ref 38–73)
NRBC BLD-RTO: 0 /100 WBC
PLATELET # BLD AUTO: 139 K/UL (ref 150–450)
PMV BLD AUTO: 8.3 FL (ref 9.2–12.9)
RBC # BLD AUTO: 4.27 M/UL (ref 4.6–6.2)
WBC # BLD AUTO: 3.47 K/UL (ref 3.9–12.7)

## 2024-12-20 PROCEDURE — 85025 COMPLETE CBC W/AUTO DIFF WBC: CPT | Performed by: PSYCHIATRY & NEUROLOGY

## 2024-12-20 PROCEDURE — 36415 COLL VENOUS BLD VENIPUNCTURE: CPT | Performed by: PSYCHIATRY & NEUROLOGY

## 2024-12-20 PROCEDURE — 82533 TOTAL CORTISOL: CPT | Performed by: PSYCHIATRY & NEUROLOGY

## 2024-12-23 ENCOUNTER — PATIENT MESSAGE (OUTPATIENT)
Dept: NEUROLOGY | Facility: CLINIC | Age: 39
End: 2024-12-23
Payer: COMMERCIAL

## 2025-01-10 ENCOUNTER — LAB VISIT (OUTPATIENT)
Dept: LAB | Facility: HOSPITAL | Age: 40
End: 2025-01-10
Attending: PSYCHIATRY & NEUROLOGY
Payer: COMMERCIAL

## 2025-01-10 DIAGNOSIS — C71.0: Primary | ICD-10-CM

## 2025-01-10 LAB
ALBUMIN SERPL BCP-MCNC: 4.3 G/DL (ref 3.5–5.2)
ALP SERPL-CCNC: 67 U/L (ref 55–135)
ALT SERPL W/O P-5'-P-CCNC: 88 U/L (ref 10–44)
ANION GAP SERPL CALC-SCNC: 7 MMOL/L (ref 8–16)
AST SERPL-CCNC: 45 U/L (ref 10–40)
BASOPHILS # BLD AUTO: 0.01 K/UL (ref 0–0.2)
BASOPHILS NFR BLD: 0.2 % (ref 0–1.9)
BILIRUB SERPL-MCNC: 1.2 MG/DL (ref 0.1–1)
BUN SERPL-MCNC: 8 MG/DL (ref 6–20)
CALCIUM SERPL-MCNC: 9.3 MG/DL (ref 8.7–10.5)
CHLORIDE SERPL-SCNC: 106 MMOL/L (ref 95–110)
CO2 SERPL-SCNC: 29 MMOL/L (ref 23–29)
CREAT SERPL-MCNC: 0.9 MG/DL (ref 0.5–1.4)
DIFFERENTIAL METHOD BLD: ABNORMAL
EOSINOPHIL # BLD AUTO: 0 K/UL (ref 0–0.5)
EOSINOPHIL NFR BLD: 0.5 % (ref 0–8)
ERYTHROCYTE [DISTWIDTH] IN BLOOD BY AUTOMATED COUNT: 11.9 % (ref 11.5–14.5)
EST. GFR  (NO RACE VARIABLE): >60 ML/MIN/1.73 M^2
GLUCOSE SERPL-MCNC: 80 MG/DL (ref 70–110)
HCT VFR BLD AUTO: 41.3 % (ref 40–54)
HGB BLD-MCNC: 14 G/DL (ref 14–18)
IMM GRANULOCYTES # BLD AUTO: 0.01 K/UL (ref 0–0.04)
IMM GRANULOCYTES NFR BLD AUTO: 0.2 % (ref 0–0.5)
LYMPHOCYTES # BLD AUTO: 0.7 K/UL (ref 1–4.8)
LYMPHOCYTES NFR BLD: 17.5 % (ref 18–48)
MCH RBC QN AUTO: 32.3 PG (ref 27–31)
MCHC RBC AUTO-ENTMCNC: 33.9 G/DL (ref 32–36)
MCV RBC AUTO: 95 FL (ref 82–98)
MONOCYTES # BLD AUTO: 0.5 K/UL (ref 0.3–1)
MONOCYTES NFR BLD: 11.1 % (ref 4–15)
NEUTROPHILS # BLD AUTO: 2.9 K/UL (ref 1.8–7.7)
NEUTROPHILS NFR BLD: 70.5 % (ref 38–73)
NRBC BLD-RTO: 0 /100 WBC
PLATELET # BLD AUTO: 195 K/UL (ref 150–450)
PMV BLD AUTO: 8.6 FL (ref 9.2–12.9)
POTASSIUM SERPL-SCNC: 4.1 MMOL/L (ref 3.5–5.1)
PROT SERPL-MCNC: 7.1 G/DL (ref 6–8.4)
RBC # BLD AUTO: 4.34 M/UL (ref 4.6–6.2)
SODIUM SERPL-SCNC: 142 MMOL/L (ref 136–145)
WBC # BLD AUTO: 4.06 K/UL (ref 3.9–12.7)

## 2025-01-10 PROCEDURE — 80053 COMPREHEN METABOLIC PANEL: CPT | Performed by: PSYCHIATRY & NEUROLOGY

## 2025-01-10 PROCEDURE — 36415 COLL VENOUS BLD VENIPUNCTURE: CPT | Performed by: PSYCHIATRY & NEUROLOGY

## 2025-01-10 PROCEDURE — 85025 COMPLETE CBC W/AUTO DIFF WBC: CPT | Performed by: PSYCHIATRY & NEUROLOGY

## 2025-01-17 ENCOUNTER — LAB VISIT (OUTPATIENT)
Dept: LAB | Facility: HOSPITAL | Age: 40
End: 2025-01-17
Attending: PSYCHIATRY & NEUROLOGY
Payer: COMMERCIAL

## 2025-01-17 DIAGNOSIS — C71.0: Primary | ICD-10-CM

## 2025-01-17 DIAGNOSIS — Z79.52 LONG TERM (CURRENT) USE OF SYSTEMIC STEROIDS: ICD-10-CM

## 2025-01-17 LAB
ALBUMIN SERPL BCP-MCNC: 4.4 G/DL (ref 3.5–5.2)
ALP SERPL-CCNC: 64 U/L (ref 55–135)
ALT SERPL W/O P-5'-P-CCNC: 65 U/L (ref 10–44)
ANION GAP SERPL CALC-SCNC: 7 MMOL/L (ref 8–16)
AST SERPL-CCNC: 36 U/L (ref 10–40)
BASOPHILS # BLD AUTO: 0.01 K/UL (ref 0–0.2)
BASOPHILS NFR BLD: 0.3 % (ref 0–1.9)
BILIRUB SERPL-MCNC: 1.3 MG/DL (ref 0.1–1)
BUN SERPL-MCNC: 11 MG/DL (ref 6–20)
CALCIUM SERPL-MCNC: 9.5 MG/DL (ref 8.7–10.5)
CHLORIDE SERPL-SCNC: 102 MMOL/L (ref 95–110)
CO2 SERPL-SCNC: 31 MMOL/L (ref 23–29)
CREAT SERPL-MCNC: 0.9 MG/DL (ref 0.5–1.4)
DIFFERENTIAL METHOD BLD: ABNORMAL
EOSINOPHIL # BLD AUTO: 0 K/UL (ref 0–0.5)
EOSINOPHIL NFR BLD: 0.3 % (ref 0–8)
ERYTHROCYTE [DISTWIDTH] IN BLOOD BY AUTOMATED COUNT: 12 % (ref 11.5–14.5)
EST. GFR  (NO RACE VARIABLE): >60 ML/MIN/1.73 M^2
GLUCOSE SERPL-MCNC: 131 MG/DL (ref 70–110)
HCT VFR BLD AUTO: 41.1 % (ref 40–54)
HGB BLD-MCNC: 13.9 G/DL (ref 14–18)
IMM GRANULOCYTES # BLD AUTO: 0.01 K/UL (ref 0–0.04)
IMM GRANULOCYTES NFR BLD AUTO: 0.3 % (ref 0–0.5)
LYMPHOCYTES # BLD AUTO: 0.7 K/UL (ref 1–4.8)
LYMPHOCYTES NFR BLD: 18.8 % (ref 18–48)
MCH RBC QN AUTO: 32.2 PG (ref 27–31)
MCHC RBC AUTO-ENTMCNC: 33.8 G/DL (ref 32–36)
MCV RBC AUTO: 95 FL (ref 82–98)
MONOCYTES # BLD AUTO: 0.6 K/UL (ref 0.3–1)
MONOCYTES NFR BLD: 16.3 % (ref 4–15)
NEUTROPHILS # BLD AUTO: 2.3 K/UL (ref 1.8–7.7)
NEUTROPHILS NFR BLD: 64 % (ref 38–73)
NRBC BLD-RTO: 0 /100 WBC
PLATELET # BLD AUTO: 167 K/UL (ref 150–450)
PMV BLD AUTO: 8.2 FL (ref 9.2–12.9)
POTASSIUM SERPL-SCNC: 4 MMOL/L (ref 3.5–5.1)
PROT SERPL-MCNC: 7.1 G/DL (ref 6–8.4)
RBC # BLD AUTO: 4.32 M/UL (ref 4.6–6.2)
SODIUM SERPL-SCNC: 140 MMOL/L (ref 136–145)
WBC # BLD AUTO: 3.61 K/UL (ref 3.9–12.7)

## 2025-01-17 PROCEDURE — 80053 COMPREHEN METABOLIC PANEL: CPT | Performed by: PSYCHIATRY & NEUROLOGY

## 2025-01-17 PROCEDURE — 85025 COMPLETE CBC W/AUTO DIFF WBC: CPT | Performed by: PSYCHIATRY & NEUROLOGY

## 2025-01-17 PROCEDURE — 36415 COLL VENOUS BLD VENIPUNCTURE: CPT | Performed by: PSYCHIATRY & NEUROLOGY

## 2025-01-24 ENCOUNTER — LAB VISIT (OUTPATIENT)
Dept: LAB | Facility: HOSPITAL | Age: 40
End: 2025-01-24
Attending: PSYCHIATRY & NEUROLOGY
Payer: COMMERCIAL

## 2025-01-24 DIAGNOSIS — C71.0 MALIGNANT NEOPLASM OF CEREBRUM, EXCEPT LOBES AND VENTRICLES: Primary | ICD-10-CM

## 2025-01-24 LAB
BASOPHILS # BLD AUTO: 0.01 K/UL (ref 0–0.2)
BASOPHILS NFR BLD: 0.3 % (ref 0–1.9)
DIFFERENTIAL METHOD BLD: ABNORMAL
EOSINOPHIL # BLD AUTO: 0 K/UL (ref 0–0.5)
EOSINOPHIL NFR BLD: 0.3 % (ref 0–8)
ERYTHROCYTE [DISTWIDTH] IN BLOOD BY AUTOMATED COUNT: 12.2 % (ref 11.5–14.5)
HCT VFR BLD AUTO: 40.3 % (ref 40–54)
HGB BLD-MCNC: 13.8 G/DL (ref 14–18)
IMM GRANULOCYTES # BLD AUTO: 0.01 K/UL (ref 0–0.04)
IMM GRANULOCYTES NFR BLD AUTO: 0.3 % (ref 0–0.5)
LYMPHOCYTES # BLD AUTO: 0.6 K/UL (ref 1–4.8)
LYMPHOCYTES NFR BLD: 15.9 % (ref 18–48)
MCH RBC QN AUTO: 32.3 PG (ref 27–31)
MCHC RBC AUTO-ENTMCNC: 34.2 G/DL (ref 32–36)
MCV RBC AUTO: 94 FL (ref 82–98)
MONOCYTES # BLD AUTO: 0.7 K/UL (ref 0.3–1)
MONOCYTES NFR BLD: 18.8 % (ref 4–15)
NEUTROPHILS # BLD AUTO: 2.2 K/UL (ref 1.8–7.7)
NEUTROPHILS NFR BLD: 64.4 % (ref 38–73)
NRBC BLD-RTO: 0 /100 WBC
PLATELET # BLD AUTO: 125 K/UL (ref 150–450)
PMV BLD AUTO: 7.9 FL (ref 9.2–12.9)
RBC # BLD AUTO: 4.27 M/UL (ref 4.6–6.2)
WBC # BLD AUTO: 3.45 K/UL (ref 3.9–12.7)

## 2025-01-24 PROCEDURE — 36415 COLL VENOUS BLD VENIPUNCTURE: CPT | Performed by: PSYCHIATRY & NEUROLOGY

## 2025-01-24 PROCEDURE — 85025 COMPLETE CBC W/AUTO DIFF WBC: CPT | Performed by: PSYCHIATRY & NEUROLOGY

## 2025-01-31 ENCOUNTER — LAB VISIT (OUTPATIENT)
Dept: LAB | Facility: HOSPITAL | Age: 40
End: 2025-01-31
Attending: PSYCHIATRY & NEUROLOGY
Payer: COMMERCIAL

## 2025-01-31 DIAGNOSIS — C71.0: Primary | ICD-10-CM

## 2025-01-31 LAB
ALBUMIN SERPL BCP-MCNC: 4.2 G/DL (ref 3.5–5.2)
ALP SERPL-CCNC: 68 U/L (ref 55–135)
ALT SERPL W/O P-5'-P-CCNC: 67 U/L (ref 10–44)
ANION GAP SERPL CALC-SCNC: 7 MMOL/L (ref 8–16)
AST SERPL-CCNC: 47 U/L (ref 10–40)
BASOPHILS # BLD AUTO: 0.01 K/UL (ref 0–0.2)
BASOPHILS NFR BLD: 0.2 % (ref 0–1.9)
BILIRUB SERPL-MCNC: 0.8 MG/DL (ref 0.1–1)
BUN SERPL-MCNC: 10 MG/DL (ref 6–20)
CALCIUM SERPL-MCNC: 9.1 MG/DL (ref 8.7–10.5)
CHLORIDE SERPL-SCNC: 104 MMOL/L (ref 95–110)
CO2 SERPL-SCNC: 29 MMOL/L (ref 23–29)
CREAT SERPL-MCNC: 0.8 MG/DL (ref 0.5–1.4)
DIFFERENTIAL METHOD BLD: ABNORMAL
EOSINOPHIL # BLD AUTO: 0 K/UL (ref 0–0.5)
EOSINOPHIL NFR BLD: 0.2 % (ref 0–8)
ERYTHROCYTE [DISTWIDTH] IN BLOOD BY AUTOMATED COUNT: 12.3 % (ref 11.5–14.5)
EST. GFR  (NO RACE VARIABLE): >60 ML/MIN/1.73 M^2
GLUCOSE SERPL-MCNC: 150 MG/DL (ref 70–110)
HCT VFR BLD AUTO: 39.7 % (ref 40–54)
HGB BLD-MCNC: 13.7 G/DL (ref 14–18)
IMM GRANULOCYTES # BLD AUTO: 0 K/UL (ref 0–0.04)
IMM GRANULOCYTES NFR BLD AUTO: 0 % (ref 0–0.5)
LYMPHOCYTES # BLD AUTO: 0.9 K/UL (ref 1–4.8)
LYMPHOCYTES NFR BLD: 20.1 % (ref 18–48)
MCH RBC QN AUTO: 32.6 PG (ref 27–31)
MCHC RBC AUTO-ENTMCNC: 34.5 G/DL (ref 32–36)
MCV RBC AUTO: 95 FL (ref 82–98)
MONOCYTES # BLD AUTO: 0.5 K/UL (ref 0.3–1)
MONOCYTES NFR BLD: 11.3 % (ref 4–15)
NEUTROPHILS # BLD AUTO: 2.9 K/UL (ref 1.8–7.7)
NEUTROPHILS NFR BLD: 68.2 % (ref 38–73)
NRBC BLD-RTO: 0 /100 WBC
PLATELET # BLD AUTO: 121 K/UL (ref 150–450)
PMV BLD AUTO: 8.5 FL (ref 9.2–12.9)
POTASSIUM SERPL-SCNC: 3.7 MMOL/L (ref 3.5–5.1)
PROT SERPL-MCNC: 7 G/DL (ref 6–8.4)
RBC # BLD AUTO: 4.2 M/UL (ref 4.6–6.2)
SODIUM SERPL-SCNC: 140 MMOL/L (ref 136–145)
WBC # BLD AUTO: 4.23 K/UL (ref 3.9–12.7)

## 2025-01-31 PROCEDURE — 36415 COLL VENOUS BLD VENIPUNCTURE: CPT | Performed by: PSYCHIATRY & NEUROLOGY

## 2025-01-31 PROCEDURE — 80053 COMPREHEN METABOLIC PANEL: CPT | Performed by: PSYCHIATRY & NEUROLOGY

## 2025-01-31 PROCEDURE — 85025 COMPLETE CBC W/AUTO DIFF WBC: CPT | Performed by: PSYCHIATRY & NEUROLOGY

## 2025-02-07 ENCOUNTER — PATIENT MESSAGE (OUTPATIENT)
Dept: NEUROSURGERY | Facility: CLINIC | Age: 40
End: 2025-02-07
Payer: COMMERCIAL

## 2025-02-07 ENCOUNTER — LAB VISIT (OUTPATIENT)
Dept: LAB | Facility: HOSPITAL | Age: 40
End: 2025-02-07
Attending: PSYCHIATRY & NEUROLOGY
Payer: COMMERCIAL

## 2025-02-07 DIAGNOSIS — F41.9 ANXIETY: ICD-10-CM

## 2025-02-07 DIAGNOSIS — C71.0: Primary | ICD-10-CM

## 2025-02-07 LAB
ALBUMIN SERPL BCP-MCNC: 4.1 G/DL (ref 3.5–5.2)
ALP SERPL-CCNC: 67 U/L (ref 55–135)
ALT SERPL W/O P-5'-P-CCNC: 68 U/L (ref 10–44)
ANION GAP SERPL CALC-SCNC: 6 MMOL/L (ref 8–16)
AST SERPL-CCNC: 41 U/L (ref 10–40)
BASOPHILS # BLD AUTO: 0.01 K/UL (ref 0–0.2)
BASOPHILS NFR BLD: 0.3 % (ref 0–1.9)
BILIRUB SERPL-MCNC: 1 MG/DL (ref 0.1–1)
BUN SERPL-MCNC: 8 MG/DL (ref 6–20)
CALCIUM SERPL-MCNC: 9 MG/DL (ref 8.7–10.5)
CHLORIDE SERPL-SCNC: 105 MMOL/L (ref 95–110)
CO2 SERPL-SCNC: 30 MMOL/L (ref 23–29)
CREAT SERPL-MCNC: 1 MG/DL (ref 0.5–1.4)
DIFFERENTIAL METHOD BLD: ABNORMAL
EOSINOPHIL # BLD AUTO: 0 K/UL (ref 0–0.5)
EOSINOPHIL NFR BLD: 0.9 % (ref 0–8)
ERYTHROCYTE [DISTWIDTH] IN BLOOD BY AUTOMATED COUNT: 12.3 % (ref 11.5–14.5)
EST. GFR  (NO RACE VARIABLE): >60 ML/MIN/1.73 M^2
GLUCOSE SERPL-MCNC: 97 MG/DL (ref 70–110)
HCT VFR BLD AUTO: 39.8 % (ref 40–54)
HGB BLD-MCNC: 13.4 G/DL (ref 14–18)
IMM GRANULOCYTES # BLD AUTO: 0.01 K/UL (ref 0–0.04)
IMM GRANULOCYTES NFR BLD AUTO: 0.3 % (ref 0–0.5)
LYMPHOCYTES # BLD AUTO: 0.7 K/UL (ref 1–4.8)
LYMPHOCYTES NFR BLD: 19.7 % (ref 18–48)
MCH RBC QN AUTO: 32.1 PG (ref 27–31)
MCHC RBC AUTO-ENTMCNC: 33.7 G/DL (ref 32–36)
MCV RBC AUTO: 95 FL (ref 82–98)
MONOCYTES # BLD AUTO: 0.6 K/UL (ref 0.3–1)
MONOCYTES NFR BLD: 16.4 % (ref 4–15)
NEUTROPHILS # BLD AUTO: 2.1 K/UL (ref 1.8–7.7)
NEUTROPHILS NFR BLD: 62.4 % (ref 38–73)
NRBC BLD-RTO: 0 /100 WBC
PLATELET # BLD AUTO: 137 K/UL (ref 150–450)
PMV BLD AUTO: 8.5 FL (ref 9.2–12.9)
POTASSIUM SERPL-SCNC: 3.8 MMOL/L (ref 3.5–5.1)
PROT SERPL-MCNC: 6.7 G/DL (ref 6–8.4)
RBC # BLD AUTO: 4.18 M/UL (ref 4.6–6.2)
SODIUM SERPL-SCNC: 141 MMOL/L (ref 136–145)
WBC # BLD AUTO: 3.35 K/UL (ref 3.9–12.7)

## 2025-02-07 PROCEDURE — 85025 COMPLETE CBC W/AUTO DIFF WBC: CPT | Performed by: PSYCHIATRY & NEUROLOGY

## 2025-02-07 PROCEDURE — 80053 COMPREHEN METABOLIC PANEL: CPT | Performed by: PSYCHIATRY & NEUROLOGY

## 2025-02-07 PROCEDURE — 36415 COLL VENOUS BLD VENIPUNCTURE: CPT | Performed by: PSYCHIATRY & NEUROLOGY

## 2025-02-07 RX ORDER — ESCITALOPRAM OXALATE 10 MG/1
10 TABLET ORAL DAILY
Qty: 30 TABLET | Refills: 11 | Status: SHIPPED | OUTPATIENT
Start: 2025-02-07 | End: 2026-02-07

## 2025-02-18 ENCOUNTER — HOSPITAL ENCOUNTER (OUTPATIENT)
Dept: PULMONOLOGY | Facility: HOSPITAL | Age: 40
Discharge: HOME | End: 2025-02-18
Attending: PSYCHIATRY & NEUROLOGY
Payer: COMMERCIAL

## 2025-02-18 DIAGNOSIS — C71.9 DIFFUSE MIDLINE GLIOMA, H3 K27M MUTANT: Primary | ICD-10-CM

## 2025-02-18 DIAGNOSIS — C71.9 DIFFUSE MIDLINE GLIOMA, H3 K27M MUTANT: ICD-10-CM

## 2025-02-18 LAB
BRPFT: ABNORMAL
DLCO ADJ PRE: 24.44 ML/(MIN*MMHG) (ref 26.37–40.22)
DLCO SINGLE BREATH LLN: 26.37
DLCO SINGLE BREATH PRE REF: 71 %
DLCO SINGLE BREATH REF: 33.29
DLCOC SBVA LLN: 3.44
DLCOC SBVA PRE REF: 76.6 %
DLCOC SBVA REF: 4.67
DLCOC SINGLE BREATH LLN: 26.37
DLCOC SINGLE BREATH PRE REF: 73.4 %
DLCOC SINGLE BREATH REF: 33.29
DLCOVA LLN: 3.44
DLCOVA PRE REF: 74.1 %
DLCOVA PRE: 3.46 ML/(MIN*MMHG*L) (ref 3.44–5.91)
DLCOVA REF: 4.67
DLVAADJ PRE: 3.58 ML/(MIN*MMHG*L) (ref 3.44–5.91)
ERVN2 LLN: -16448.54
ERVN2 PRE REF: 78.3 %
ERVN2 PRE: 1.15 L (ref -16448.54–16451.46)
ERVN2 REF: 1.46
FEF 25 75 LLN: 2.76
FEF 25 75 PRE REF: 114.5 %
FEF 25 75 REF: 4.47
FEV1 FVC LLN: 70
FEV1 FVC PRE REF: 101.9 %
FEV1 FVC REF: 81
FEV1 LLN: 3.36
FEV1 PRE REF: 109.9 %
FEV1 REF: 4.24
FRCN2 LLN: 2.43
FRCN2 PRE REF: 72.5 %
FRCN2 REF: 3.42
FVC LLN: 4.19
FVC PRE REF: 107.4 %
FVC REF: 5.26
IVC PRE: 5.54 L (ref 4.19–6.35)
IVC SINGLE BREATH LLN: 4.19
IVC SINGLE BREATH PRE REF: 105.2 %
IVC SINGLE BREATH REF: 5.26
MVV LLN: 137
MVV PRE REF: 104.8 %
MVV REF: 161
PEF LLN: 7.87
PEF PRE REF: 58.8 %
PEF REF: 10.19
PRE DLCO: 23.65 ML/(MIN*MMHG) (ref 26.37–40.22)
PRE FEF 25 75: 5.12 L/S (ref 2.76–6.18)
PRE FET 100: 5.97 SEC
PRE FEV1 FVC: 82.43 % (ref 70.41–89.69)
PRE FEV1: 4.66 L (ref 3.36–5.08)
PRE FRC N2: 2.48 L (ref 2.43–4.41)
PRE FVC: 5.65 L (ref 4.19–6.35)
PRE MVV: 168.7 L/MIN (ref 136.83–185.12)
PRE PEF: 6 L/S (ref 7.87–12.51)
RVN2 LLN: 1.28
RVN2 PRE REF: 68.2 %
RVN2 PRE: 1.33 L (ref 1.28–2.63)
RVN2 REF: 1.96
RVN2TLCN2 LLN: 20.19
RVN2TLCN2 PRE REF: 65.5 %
RVN2TLCN2 PRE: 19.1 % (ref 20.19–38.15)
RVN2TLCN2 REF: 29.17
TLCN2 LLN: 5.97
TLCN2 PRE REF: 98.1 %
TLCN2 PRE: 6.99 L (ref 5.97–8.28)
TLCN2 REF: 7.13
VA PRE: 6.83 L (ref 6.98–6.98)
VA SINGLE BREATH LLN: 6.98
VA SINGLE BREATH PRE REF: 97.9 %
VA SINGLE BREATH REF: 6.98
VCMAXN2 LLN: 4.19
VCMAXN2 PRE REF: 107.4 %
VCMAXN2 PRE: 5.65 L (ref 4.19–6.35)
VCMAXN2 REF: 5.26

## 2025-02-18 PROCEDURE — 94010 BREATHING CAPACITY TEST: CPT

## 2025-02-18 PROCEDURE — 94729 DIFFUSING CAPACITY: CPT

## 2025-02-18 PROCEDURE — 94727 GAS DIL/WSHOT DETER LNG VOL: CPT

## 2025-02-24 ENCOUNTER — LAB VISIT (OUTPATIENT)
Dept: LAB | Facility: HOSPITAL | Age: 40
End: 2025-02-24
Attending: PSYCHIATRY & NEUROLOGY
Payer: COMMERCIAL

## 2025-02-24 DIAGNOSIS — C71.0: Primary | ICD-10-CM

## 2025-02-24 LAB
ALBUMIN SERPL BCP-MCNC: 4 G/DL (ref 3.5–5.2)
ALP SERPL-CCNC: 124 U/L (ref 55–135)
ALT SERPL W/O P-5'-P-CCNC: 136 U/L (ref 10–44)
ANION GAP SERPL CALC-SCNC: 10 MMOL/L (ref 8–16)
AST SERPL-CCNC: 82 U/L (ref 10–40)
BASOPHILS # BLD AUTO: 0 K/UL (ref 0–0.2)
BASOPHILS NFR BLD: 0 % (ref 0–1.9)
BILIRUB SERPL-MCNC: 0.7 MG/DL (ref 0.1–1)
BUN SERPL-MCNC: 9 MG/DL (ref 6–20)
CALCIUM SERPL-MCNC: 8.9 MG/DL (ref 8.7–10.5)
CHLORIDE SERPL-SCNC: 101 MMOL/L (ref 95–110)
CO2 SERPL-SCNC: 28 MMOL/L (ref 23–29)
CREAT SERPL-MCNC: 0.8 MG/DL (ref 0.5–1.4)
DIFFERENTIAL METHOD BLD: ABNORMAL
EOSINOPHIL # BLD AUTO: 0 K/UL (ref 0–0.5)
EOSINOPHIL NFR BLD: 0.8 % (ref 0–8)
ERYTHROCYTE [DISTWIDTH] IN BLOOD BY AUTOMATED COUNT: 12.2 % (ref 11.5–14.5)
EST. GFR  (NO RACE VARIABLE): >60 ML/MIN/1.73 M^2
GLUCOSE SERPL-MCNC: 111 MG/DL (ref 70–110)
HCT VFR BLD AUTO: 41.9 % (ref 40–54)
HGB BLD-MCNC: 13.9 G/DL (ref 14–18)
IMM GRANULOCYTES # BLD AUTO: 0 K/UL (ref 0–0.04)
IMM GRANULOCYTES NFR BLD AUTO: 0 % (ref 0–0.5)
LYMPHOCYTES # BLD AUTO: 0.5 K/UL (ref 1–4.8)
LYMPHOCYTES NFR BLD: 18.6 % (ref 18–48)
MCH RBC QN AUTO: 31.6 PG (ref 27–31)
MCHC RBC AUTO-ENTMCNC: 33.2 G/DL (ref 32–36)
MCV RBC AUTO: 95 FL (ref 82–98)
MONOCYTES # BLD AUTO: 0.5 K/UL (ref 0.3–1)
MONOCYTES NFR BLD: 20.2 % (ref 4–15)
NEUTROPHILS # BLD AUTO: 1.6 K/UL (ref 1.8–7.7)
NEUTROPHILS NFR BLD: 60.4 % (ref 38–73)
NRBC BLD-RTO: 0 /100 WBC
PLATELET # BLD AUTO: 126 K/UL (ref 150–450)
PMV BLD AUTO: 8.4 FL (ref 9.2–12.9)
POTASSIUM SERPL-SCNC: 3.5 MMOL/L (ref 3.5–5.1)
PROT SERPL-MCNC: 7.1 G/DL (ref 6–8.4)
RBC # BLD AUTO: 4.4 M/UL (ref 4.6–6.2)
SODIUM SERPL-SCNC: 139 MMOL/L (ref 136–145)
WBC # BLD AUTO: 2.58 K/UL (ref 3.9–12.7)

## 2025-02-24 PROCEDURE — 36415 COLL VENOUS BLD VENIPUNCTURE: CPT | Performed by: PSYCHIATRY & NEUROLOGY

## 2025-02-24 PROCEDURE — 80053 COMPREHEN METABOLIC PANEL: CPT | Performed by: PSYCHIATRY & NEUROLOGY

## 2025-02-24 PROCEDURE — 85025 COMPLETE CBC W/AUTO DIFF WBC: CPT | Performed by: PSYCHIATRY & NEUROLOGY

## 2025-03-07 ENCOUNTER — LAB VISIT (OUTPATIENT)
Dept: LAB | Facility: HOSPITAL | Age: 40
End: 2025-03-07
Attending: PSYCHIATRY & NEUROLOGY
Payer: COMMERCIAL

## 2025-03-07 DIAGNOSIS — C71.0: Primary | ICD-10-CM

## 2025-03-07 LAB
ALBUMIN SERPL BCP-MCNC: 4.2 G/DL (ref 3.5–5.2)
ALP SERPL-CCNC: 118 U/L (ref 55–135)
ALT SERPL W/O P-5'-P-CCNC: 97 U/L (ref 10–44)
ANION GAP SERPL CALC-SCNC: 8 MMOL/L (ref 8–16)
AST SERPL-CCNC: 48 U/L (ref 10–40)
BASOPHILS # BLD AUTO: 0.02 K/UL (ref 0–0.2)
BASOPHILS NFR BLD: 0.4 % (ref 0–1.9)
BILIRUB SERPL-MCNC: 1.3 MG/DL (ref 0.1–1)
BUN SERPL-MCNC: 13 MG/DL (ref 6–20)
CALCIUM SERPL-MCNC: 9.1 MG/DL (ref 8.7–10.5)
CHLORIDE SERPL-SCNC: 102 MMOL/L (ref 95–110)
CO2 SERPL-SCNC: 31 MMOL/L (ref 23–29)
CREAT SERPL-MCNC: 0.8 MG/DL (ref 0.5–1.4)
DIFFERENTIAL METHOD BLD: ABNORMAL
EOSINOPHIL # BLD AUTO: 0.1 K/UL (ref 0–0.5)
EOSINOPHIL NFR BLD: 1 % (ref 0–8)
ERYTHROCYTE [DISTWIDTH] IN BLOOD BY AUTOMATED COUNT: 12 % (ref 11.5–14.5)
EST. GFR  (NO RACE VARIABLE): >60 ML/MIN/1.73 M^2
GLUCOSE SERPL-MCNC: 98 MG/DL (ref 70–110)
HCT VFR BLD AUTO: 40.1 % (ref 40–54)
HGB BLD-MCNC: 13.5 G/DL (ref 14–18)
IMM GRANULOCYTES # BLD AUTO: 0.02 K/UL (ref 0–0.04)
IMM GRANULOCYTES NFR BLD AUTO: 0.4 % (ref 0–0.5)
LYMPHOCYTES # BLD AUTO: 0.8 K/UL (ref 1–4.8)
LYMPHOCYTES NFR BLD: 15.6 % (ref 18–48)
MCH RBC QN AUTO: 31.8 PG (ref 27–31)
MCHC RBC AUTO-ENTMCNC: 33.7 G/DL (ref 32–36)
MCV RBC AUTO: 95 FL (ref 82–98)
MONOCYTES # BLD AUTO: 0.6 K/UL (ref 0.3–1)
MONOCYTES NFR BLD: 11.1 % (ref 4–15)
NEUTROPHILS # BLD AUTO: 3.5 K/UL (ref 1.8–7.7)
NEUTROPHILS NFR BLD: 71.5 % (ref 38–73)
NRBC BLD-RTO: 0 /100 WBC
PLATELET # BLD AUTO: 190 K/UL (ref 150–450)
PMV BLD AUTO: 8 FL (ref 9.2–12.9)
POTASSIUM SERPL-SCNC: 3.8 MMOL/L (ref 3.5–5.1)
PROT SERPL-MCNC: 7 G/DL (ref 6–8.4)
RBC # BLD AUTO: 4.24 M/UL (ref 4.6–6.2)
SODIUM SERPL-SCNC: 141 MMOL/L (ref 136–145)
WBC # BLD AUTO: 4.95 K/UL (ref 3.9–12.7)

## 2025-03-07 PROCEDURE — 36415 COLL VENOUS BLD VENIPUNCTURE: CPT | Performed by: PSYCHIATRY & NEUROLOGY

## 2025-03-07 PROCEDURE — 85025 COMPLETE CBC W/AUTO DIFF WBC: CPT | Performed by: PSYCHIATRY & NEUROLOGY

## 2025-03-07 PROCEDURE — 80053 COMPREHEN METABOLIC PANEL: CPT | Performed by: PSYCHIATRY & NEUROLOGY

## 2025-03-11 ENCOUNTER — PATIENT MESSAGE (OUTPATIENT)
Facility: CLINIC | Age: 40
End: 2025-03-11
Payer: COMMERCIAL

## 2025-03-17 ENCOUNTER — LAB VISIT (OUTPATIENT)
Dept: LAB | Facility: HOSPITAL | Age: 40
End: 2025-03-17
Attending: PSYCHIATRY & NEUROLOGY
Payer: COMMERCIAL

## 2025-03-17 DIAGNOSIS — C71.0: ICD-10-CM

## 2025-03-17 LAB
ALBUMIN SERPL BCP-MCNC: 4.1 G/DL (ref 3.5–5.2)
ALP SERPL-CCNC: 129 U/L (ref 55–135)
ALT SERPL W/O P-5'-P-CCNC: 96 U/L (ref 10–44)
ANION GAP SERPL CALC-SCNC: 7 MMOL/L (ref 8–16)
AST SERPL-CCNC: 48 U/L (ref 10–40)
BASOPHILS # BLD AUTO: 0 K/UL (ref 0–0.2)
BASOPHILS NFR BLD: 0 % (ref 0–1.9)
BILIRUB SERPL-MCNC: 1.3 MG/DL (ref 0.1–1)
BUN SERPL-MCNC: 9 MG/DL (ref 6–20)
CALCIUM SERPL-MCNC: 9 MG/DL (ref 8.7–10.5)
CHLORIDE SERPL-SCNC: 101 MMOL/L (ref 95–110)
CO2 SERPL-SCNC: 30 MMOL/L (ref 23–29)
CREAT SERPL-MCNC: 0.8 MG/DL (ref 0.5–1.4)
DIFFERENTIAL METHOD BLD: ABNORMAL
EOSINOPHIL # BLD AUTO: 0 K/UL (ref 0–0.5)
EOSINOPHIL NFR BLD: 0.3 % (ref 0–8)
ERYTHROCYTE [DISTWIDTH] IN BLOOD BY AUTOMATED COUNT: 12.6 % (ref 11.5–14.5)
EST. GFR  (NO RACE VARIABLE): >60 ML/MIN/1.73 M^2
GLUCOSE SERPL-MCNC: 130 MG/DL (ref 70–110)
HCT VFR BLD AUTO: 37.9 % (ref 40–54)
HGB BLD-MCNC: 12.8 G/DL (ref 14–18)
IMM GRANULOCYTES # BLD AUTO: 0 K/UL (ref 0–0.04)
IMM GRANULOCYTES NFR BLD AUTO: 0 % (ref 0–0.5)
LYMPHOCYTES # BLD AUTO: 0.6 K/UL (ref 1–4.8)
LYMPHOCYTES NFR BLD: 18.1 % (ref 18–48)
MCH RBC QN AUTO: 32.4 PG (ref 27–31)
MCHC RBC AUTO-ENTMCNC: 33.8 G/DL (ref 32–36)
MCV RBC AUTO: 96 FL (ref 82–98)
MONOCYTES # BLD AUTO: 0.5 K/UL (ref 0.3–1)
MONOCYTES NFR BLD: 14.6 % (ref 4–15)
NEUTROPHILS # BLD AUTO: 2.3 K/UL (ref 1.8–7.7)
NEUTROPHILS NFR BLD: 67 % (ref 38–73)
NRBC BLD-RTO: 0 /100 WBC
PLATELET # BLD AUTO: 152 K/UL (ref 150–450)
PMV BLD AUTO: 8.5 FL (ref 9.2–12.9)
POTASSIUM SERPL-SCNC: 3.7 MMOL/L (ref 3.5–5.1)
PROT SERPL-MCNC: 6.7 G/DL (ref 6–8.4)
RBC # BLD AUTO: 3.95 M/UL (ref 4.6–6.2)
SODIUM SERPL-SCNC: 138 MMOL/L (ref 136–145)
WBC # BLD AUTO: 3.42 K/UL (ref 3.9–12.7)

## 2025-03-17 PROCEDURE — 36415 COLL VENOUS BLD VENIPUNCTURE: CPT | Performed by: PSYCHIATRY & NEUROLOGY

## 2025-03-17 PROCEDURE — 80053 COMPREHEN METABOLIC PANEL: CPT | Performed by: PSYCHIATRY & NEUROLOGY

## 2025-03-17 PROCEDURE — 85025 COMPLETE CBC W/AUTO DIFF WBC: CPT | Performed by: PSYCHIATRY & NEUROLOGY

## 2025-03-28 ENCOUNTER — LAB VISIT (OUTPATIENT)
Dept: LAB | Facility: HOSPITAL | Age: 40
End: 2025-03-28
Attending: PSYCHIATRY & NEUROLOGY
Payer: COMMERCIAL

## 2025-03-28 DIAGNOSIS — C71.0: Primary | ICD-10-CM

## 2025-03-28 LAB
ABSOLUTE EOSINOPHIL (OHS): 0.02 K/UL
ABSOLUTE MONOCYTE (OHS): 0.65 K/UL (ref 0.3–1)
ABSOLUTE NEUTROPHIL COUNT (OHS): 2.85 K/UL (ref 1.8–7.7)
ALBUMIN SERPL BCP-MCNC: 4.1 G/DL (ref 3.5–5.2)
ALP SERPL-CCNC: 140 UNIT/L (ref 40–150)
ALT SERPL W/O P-5'-P-CCNC: 70 UNIT/L (ref 10–44)
ANION GAP (OHS): 7 MMOL/L (ref 8–16)
AST SERPL-CCNC: 42 UNIT/L (ref 11–45)
BASOPHILS # BLD AUTO: 0.01 K/UL
BASOPHILS NFR BLD AUTO: 0.2 %
BILIRUB SERPL-MCNC: 1.4 MG/DL (ref 0.1–1)
BUN SERPL-MCNC: 11 MG/DL (ref 6–20)
CALCIUM SERPL-MCNC: 8.9 MG/DL (ref 8.7–10.5)
CHLORIDE SERPL-SCNC: 102 MMOL/L (ref 95–110)
CO2 SERPL-SCNC: 31 MMOL/L (ref 23–29)
CREAT SERPL-MCNC: 0.8 MG/DL (ref 0.5–1.4)
ERYTHROCYTE [DISTWIDTH] IN BLOOD BY AUTOMATED COUNT: 12.8 % (ref 11.5–14.5)
GFR SERPLBLD CREATININE-BSD FMLA CKD-EPI: >60 ML/MIN/1.73/M2
GLUCOSE SERPL-MCNC: 101 MG/DL (ref 70–110)
HCT VFR BLD AUTO: 37.8 % (ref 40–54)
HGB BLD-MCNC: 12.9 GM/DL (ref 14–18)
IMM GRANULOCYTES # BLD AUTO: 0.01 K/UL (ref 0–0.04)
IMM GRANULOCYTES NFR BLD AUTO: 0.2 % (ref 0–0.5)
LYMPHOCYTES # BLD AUTO: 0.69 K/UL (ref 1–4.8)
MCH RBC QN AUTO: 32.6 PG (ref 27–50)
MCHC RBC AUTO-ENTMCNC: 34.1 G/DL (ref 32–36)
MCV RBC AUTO: 96 FL (ref 82–98)
NUCLEATED RBC (/100WBC) (OHS): 0 /100 WBC
PLATELET # BLD AUTO: 130 K/UL (ref 150–450)
PMV BLD AUTO: 8.9 FL (ref 9.2–12.9)
POTASSIUM SERPL-SCNC: 3.8 MMOL/L (ref 3.5–5.1)
PROT SERPL-MCNC: 7 GM/DL (ref 6–8.4)
RBC # BLD AUTO: 3.96 M/UL (ref 4.6–6.2)
RELATIVE EOSINOPHIL (OHS): 0.5 %
RELATIVE LYMPHOCYTE (OHS): 16.3 % (ref 18–48)
RELATIVE MONOCYTE (OHS): 15.4 % (ref 4–15)
RELATIVE NEUTROPHIL (OHS): 67.4 % (ref 38–73)
SODIUM SERPL-SCNC: 140 MMOL/L (ref 136–145)
WBC # BLD AUTO: 4.23 K/UL (ref 3.9–12.7)

## 2025-03-28 PROCEDURE — 85025 COMPLETE CBC W/AUTO DIFF WBC: CPT

## 2025-03-28 PROCEDURE — 36415 COLL VENOUS BLD VENIPUNCTURE: CPT

## 2025-03-28 PROCEDURE — 82040 ASSAY OF SERUM ALBUMIN: CPT

## 2025-04-08 ENCOUNTER — PATIENT MESSAGE (OUTPATIENT)
Dept: NEUROSURGERY | Facility: CLINIC | Age: 40
End: 2025-04-08
Payer: COMMERCIAL

## 2025-04-08 DIAGNOSIS — F41.9 ANXIETY: Primary | ICD-10-CM

## 2025-04-09 ENCOUNTER — TELEPHONE (OUTPATIENT)
Facility: CLINIC | Age: 40
End: 2025-04-09
Payer: COMMERCIAL

## 2025-04-09 RX ORDER — ESCITALOPRAM OXALATE 10 MG/1
15 TABLET ORAL DAILY
Qty: 135 TABLET | Refills: 3 | Status: SHIPPED | OUTPATIENT
Start: 2025-04-09 | End: 2026-04-09

## 2025-04-09 NOTE — TELEPHONE ENCOUNTER
Scheduled f/u with Dr. Og on 04/22 at 2:30. Pt spouse had some concerns about Lexapro dosing. Stated Sukumar has been more irritable than usual. Told her I would pass her concerns onto Dr. Og.

## 2025-04-09 NOTE — TELEPHONE ENCOUNTER
----- Message from Onelia sent at 4/9/2025  8:15 AM CDT -----  Regarding: Missed call  Contact: [phone]  Caller is returning a missed called from office. Please call back as soon as possible.

## 2025-04-17 ENCOUNTER — LAB VISIT (OUTPATIENT)
Dept: LAB | Facility: HOSPITAL | Age: 40
End: 2025-04-17
Attending: PSYCHIATRY & NEUROLOGY
Payer: COMMERCIAL

## 2025-04-17 DIAGNOSIS — C71.0: Primary | ICD-10-CM

## 2025-04-17 LAB
ABSOLUTE EOSINOPHIL (OHS): 0.04 K/UL
ABSOLUTE MONOCYTE (OHS): 0.33 K/UL (ref 0.3–1)
ABSOLUTE NEUTROPHIL COUNT (OHS): 1.72 K/UL (ref 1.8–7.7)
ALBUMIN SERPL BCP-MCNC: 4.2 G/DL (ref 3.5–5.2)
ALP SERPL-CCNC: 164 UNIT/L (ref 40–150)
ALT SERPL W/O P-5'-P-CCNC: 63 UNIT/L (ref 10–44)
ANION GAP (OHS): 5 MMOL/L (ref 8–16)
AST SERPL-CCNC: 46 UNIT/L (ref 11–45)
BASOPHILS # BLD AUTO: 0.01 K/UL
BASOPHILS NFR BLD AUTO: 0.4 %
BILIRUB SERPL-MCNC: 1.2 MG/DL (ref 0.1–1)
BUN SERPL-MCNC: 14 MG/DL (ref 6–20)
CALCIUM SERPL-MCNC: 9.2 MG/DL (ref 8.7–10.5)
CHLORIDE SERPL-SCNC: 101 MMOL/L (ref 95–110)
CO2 SERPL-SCNC: 31 MMOL/L (ref 23–29)
CREAT SERPL-MCNC: 0.9 MG/DL (ref 0.5–1.4)
ERYTHROCYTE [DISTWIDTH] IN BLOOD BY AUTOMATED COUNT: 13.2 % (ref 11.5–14.5)
GFR SERPLBLD CREATININE-BSD FMLA CKD-EPI: >60 ML/MIN/1.73/M2
GLUCOSE SERPL-MCNC: 167 MG/DL (ref 70–110)
HCT VFR BLD AUTO: 37 % (ref 40–54)
HGB BLD-MCNC: 12.6 GM/DL (ref 14–18)
IMM GRANULOCYTES # BLD AUTO: 0 K/UL (ref 0–0.04)
IMM GRANULOCYTES NFR BLD AUTO: 0 % (ref 0–0.5)
LYMPHOCYTES # BLD AUTO: 0.64 K/UL (ref 1–4.8)
MCH RBC QN AUTO: 33.2 PG (ref 27–31)
MCHC RBC AUTO-ENTMCNC: 34.1 G/DL (ref 32–36)
MCV RBC AUTO: 97 FL (ref 82–98)
NUCLEATED RBC (/100WBC) (OHS): 0 /100 WBC
PLATELET # BLD AUTO: 127 K/UL (ref 150–450)
PMV BLD AUTO: 8.3 FL (ref 9.2–12.9)
POTASSIUM SERPL-SCNC: 4 MMOL/L (ref 3.5–5.1)
PROT SERPL-MCNC: 6.9 GM/DL (ref 6–8.4)
RBC # BLD AUTO: 3.8 M/UL (ref 4.6–6.2)
RELATIVE EOSINOPHIL (OHS): 1.5 %
RELATIVE LYMPHOCYTE (OHS): 23.4 % (ref 18–48)
RELATIVE MONOCYTE (OHS): 12 % (ref 4–15)
RELATIVE NEUTROPHIL (OHS): 62.7 % (ref 38–73)
SODIUM SERPL-SCNC: 137 MMOL/L (ref 136–145)
WBC # BLD AUTO: 2.74 K/UL (ref 3.9–12.7)

## 2025-04-17 PROCEDURE — 85025 COMPLETE CBC W/AUTO DIFF WBC: CPT

## 2025-04-17 PROCEDURE — 36415 COLL VENOUS BLD VENIPUNCTURE: CPT

## 2025-04-17 PROCEDURE — 82040 ASSAY OF SERUM ALBUMIN: CPT

## 2025-04-28 ENCOUNTER — PATIENT MESSAGE (OUTPATIENT)
Facility: CLINIC | Age: 40
End: 2025-04-28
Payer: COMMERCIAL

## 2025-05-06 ENCOUNTER — PATIENT MESSAGE (OUTPATIENT)
Facility: CLINIC | Age: 40
End: 2025-05-06
Payer: COMMERCIAL

## 2025-05-30 ENCOUNTER — LAB VISIT (OUTPATIENT)
Dept: LAB | Facility: HOSPITAL | Age: 40
End: 2025-05-30
Attending: PSYCHIATRY & NEUROLOGY
Payer: COMMERCIAL

## 2025-05-30 DIAGNOSIS — C71.0 MALIGNANT NEOPLASM OF CEREBRUM, EXCEPT LOBES AND VENTRICLES: Primary | ICD-10-CM

## 2025-05-30 LAB
ABSOLUTE EOSINOPHIL (OHS): 0.02 K/UL
ABSOLUTE MONOCYTE (OHS): 0.49 K/UL (ref 0.3–1)
ABSOLUTE NEUTROPHIL COUNT (OHS): 3.19 K/UL (ref 1.8–7.7)
ALBUMIN SERPL BCP-MCNC: 4.5 G/DL (ref 3.5–5.2)
ALP SERPL-CCNC: 131 UNIT/L (ref 40–150)
ALT SERPL W/O P-5'-P-CCNC: 71 UNIT/L (ref 10–44)
ANION GAP (OHS): 9 MMOL/L (ref 8–16)
AST SERPL-CCNC: 34 UNIT/L (ref 11–45)
BASOPHILS # BLD AUTO: 0.01 K/UL
BASOPHILS NFR BLD AUTO: 0.2 %
BILIRUB SERPL-MCNC: 1.7 MG/DL (ref 0.1–1)
BUN SERPL-MCNC: 14 MG/DL (ref 6–20)
CALCIUM SERPL-MCNC: 9.3 MG/DL (ref 8.7–10.5)
CHLORIDE SERPL-SCNC: 99 MMOL/L (ref 95–110)
CO2 SERPL-SCNC: 30 MMOL/L (ref 23–29)
CREAT SERPL-MCNC: 0.9 MG/DL (ref 0.5–1.4)
ERYTHROCYTE [DISTWIDTH] IN BLOOD BY AUTOMATED COUNT: 12.2 % (ref 11.5–14.5)
GFR SERPLBLD CREATININE-BSD FMLA CKD-EPI: >60 ML/MIN/1.73/M2
GLUCOSE SERPL-MCNC: 109 MG/DL (ref 70–110)
HCT VFR BLD AUTO: 41.8 % (ref 40–54)
HGB BLD-MCNC: 14.2 GM/DL (ref 14–18)
IMM GRANULOCYTES # BLD AUTO: 0.01 K/UL (ref 0–0.04)
IMM GRANULOCYTES NFR BLD AUTO: 0.2 % (ref 0–0.5)
LYMPHOCYTES # BLD AUTO: 0.86 K/UL (ref 1–4.8)
MCH RBC QN AUTO: 33.1 PG (ref 27–31)
MCHC RBC AUTO-ENTMCNC: 34 G/DL (ref 32–36)
MCV RBC AUTO: 97 FL (ref 82–98)
NUCLEATED RBC (/100WBC) (OHS): 0 /100 WBC
PLATELET # BLD AUTO: 172 K/UL (ref 150–450)
PMV BLD AUTO: 8.1 FL (ref 9.2–12.9)
POTASSIUM SERPL-SCNC: 4.4 MMOL/L (ref 3.5–5.1)
PROT SERPL-MCNC: 7.4 GM/DL (ref 6–8.4)
RBC # BLD AUTO: 4.29 M/UL (ref 4.6–6.2)
RELATIVE EOSINOPHIL (OHS): 0.4 %
RELATIVE LYMPHOCYTE (OHS): 18.8 % (ref 18–48)
RELATIVE MONOCYTE (OHS): 10.7 % (ref 4–15)
RELATIVE NEUTROPHIL (OHS): 69.7 % (ref 38–73)
SODIUM SERPL-SCNC: 138 MMOL/L (ref 136–145)
WBC # BLD AUTO: 4.58 K/UL (ref 3.9–12.7)

## 2025-05-30 PROCEDURE — 85025 COMPLETE CBC W/AUTO DIFF WBC: CPT

## 2025-05-30 PROCEDURE — 82565 ASSAY OF CREATININE: CPT

## 2025-05-30 PROCEDURE — 36415 COLL VENOUS BLD VENIPUNCTURE: CPT

## 2025-06-05 ENCOUNTER — TELEPHONE (OUTPATIENT)
Dept: NEUROSURGERY | Facility: CLINIC | Age: 40
End: 2025-06-05
Payer: COMMERCIAL

## 2025-06-05 ENCOUNTER — OFFICE VISIT (OUTPATIENT)
Facility: CLINIC | Age: 40
End: 2025-06-05
Payer: COMMERCIAL

## 2025-06-05 DIAGNOSIS — C71.9 DIFFUSE MIDLINE GLIOMA, H3 K27M MUTANT: Primary | ICD-10-CM

## 2025-06-05 PROCEDURE — 98007 SYNCH AUDIO-VIDEO EST HI 40: CPT | Mod: 95,,, | Performed by: PSYCHIATRY & NEUROLOGY

## 2025-06-05 PROCEDURE — G2211 COMPLEX E/M VISIT ADD ON: HCPCS | Mod: 95,,, | Performed by: PSYCHIATRY & NEUROLOGY

## 2025-06-13 ENCOUNTER — LAB VISIT (OUTPATIENT)
Dept: LAB | Facility: HOSPITAL | Age: 40
End: 2025-06-13
Attending: PSYCHIATRY & NEUROLOGY
Payer: COMMERCIAL

## 2025-06-13 DIAGNOSIS — C71.9 DIFFUSE MIDLINE GLIOMA, H3 K27M MUTANT: ICD-10-CM

## 2025-06-13 LAB
ABSOLUTE EOSINOPHIL (OHS): 0.03 K/UL
ABSOLUTE MONOCYTE (OHS): 0.64 K/UL (ref 0.3–1)
ABSOLUTE NEUTROPHIL COUNT (OHS): 3.59 K/UL (ref 1.8–7.7)
ALBUMIN SERPL BCP-MCNC: 4.4 G/DL (ref 3.5–5.2)
ALP SERPL-CCNC: 136 UNIT/L (ref 40–150)
ALT SERPL W/O P-5'-P-CCNC: 65 UNIT/L (ref 10–44)
ANION GAP (OHS): 7 MMOL/L (ref 8–16)
AST SERPL-CCNC: 52 UNIT/L (ref 11–45)
BASOPHILS # BLD AUTO: 0.01 K/UL
BASOPHILS NFR BLD AUTO: 0.2 %
BILIRUB SERPL-MCNC: 1.1 MG/DL (ref 0.1–1)
BUN SERPL-MCNC: 12 MG/DL (ref 6–20)
CALCIUM SERPL-MCNC: 9.2 MG/DL (ref 8.7–10.5)
CHLORIDE SERPL-SCNC: 101 MMOL/L (ref 95–110)
CO2 SERPL-SCNC: 29 MMOL/L (ref 23–29)
CREAT SERPL-MCNC: 0.8 MG/DL (ref 0.5–1.4)
ERYTHROCYTE [DISTWIDTH] IN BLOOD BY AUTOMATED COUNT: 12.1 % (ref 11.5–14.5)
GFR SERPLBLD CREATININE-BSD FMLA CKD-EPI: >60 ML/MIN/1.73/M2
GLUCOSE SERPL-MCNC: 117 MG/DL (ref 70–110)
HCT VFR BLD AUTO: 40.7 % (ref 40–54)
HGB BLD-MCNC: 13.7 GM/DL (ref 14–18)
IMM GRANULOCYTES # BLD AUTO: 0.01 K/UL (ref 0–0.04)
IMM GRANULOCYTES NFR BLD AUTO: 0.2 % (ref 0–0.5)
LYMPHOCYTES # BLD AUTO: 0.63 K/UL (ref 1–4.8)
MCH RBC QN AUTO: 33.2 PG (ref 27–31)
MCHC RBC AUTO-ENTMCNC: 33.7 G/DL (ref 32–36)
MCV RBC AUTO: 99 FL (ref 82–98)
NUCLEATED RBC (/100WBC) (OHS): 0 /100 WBC
PLATELET # BLD AUTO: 143 K/UL (ref 150–450)
PMV BLD AUTO: 8.1 FL (ref 9.2–12.9)
POTASSIUM SERPL-SCNC: 4.4 MMOL/L (ref 3.5–5.1)
PROT SERPL-MCNC: 7.2 GM/DL (ref 6–8.4)
RBC # BLD AUTO: 4.13 M/UL (ref 4.6–6.2)
RELATIVE EOSINOPHIL (OHS): 0.6 %
RELATIVE LYMPHOCYTE (OHS): 12.8 % (ref 18–48)
RELATIVE MONOCYTE (OHS): 13 % (ref 4–15)
RELATIVE NEUTROPHIL (OHS): 73.2 % (ref 38–73)
SODIUM SERPL-SCNC: 137 MMOL/L (ref 136–145)
WBC # BLD AUTO: 4.91 K/UL (ref 3.9–12.7)

## 2025-06-13 PROCEDURE — 80053 COMPREHEN METABOLIC PANEL: CPT

## 2025-06-13 PROCEDURE — 36415 COLL VENOUS BLD VENIPUNCTURE: CPT

## 2025-06-13 PROCEDURE — 85025 COMPLETE CBC W/AUTO DIFF WBC: CPT

## 2025-06-20 ENCOUNTER — LAB VISIT (OUTPATIENT)
Dept: LAB | Facility: HOSPITAL | Age: 40
End: 2025-06-20
Attending: PSYCHIATRY & NEUROLOGY
Payer: COMMERCIAL

## 2025-06-20 DIAGNOSIS — C71.9 DIFFUSE MIDLINE GLIOMA, H3 K27M MUTANT: ICD-10-CM

## 2025-06-20 LAB
ABSOLUTE EOSINOPHIL (OHS): 0.02 K/UL
ABSOLUTE MONOCYTE (OHS): 0.56 K/UL (ref 0.3–1)
ABSOLUTE NEUTROPHIL COUNT (OHS): 2.85 K/UL (ref 1.8–7.7)
BASOPHILS # BLD AUTO: 0.01 K/UL
BASOPHILS NFR BLD AUTO: 0.2 %
ERYTHROCYTE [DISTWIDTH] IN BLOOD BY AUTOMATED COUNT: 12.1 % (ref 11.5–14.5)
HCT VFR BLD AUTO: 38.8 % (ref 40–54)
HGB BLD-MCNC: 13.4 GM/DL (ref 14–18)
IMM GRANULOCYTES # BLD AUTO: 0.01 K/UL (ref 0–0.04)
IMM GRANULOCYTES NFR BLD AUTO: 0.2 % (ref 0–0.5)
LYMPHOCYTES # BLD AUTO: 0.74 K/UL (ref 1–4.8)
MCH RBC QN AUTO: 33.4 PG (ref 27–31)
MCHC RBC AUTO-ENTMCNC: 34.5 G/DL (ref 32–36)
MCV RBC AUTO: 97 FL (ref 82–98)
NUCLEATED RBC (/100WBC) (OHS): 0 /100 WBC
PLATELET # BLD AUTO: 124 K/UL (ref 150–450)
PMV BLD AUTO: 8.5 FL (ref 9.2–12.9)
RBC # BLD AUTO: 4.01 M/UL (ref 4.6–6.2)
RELATIVE EOSINOPHIL (OHS): 0.5 %
RELATIVE LYMPHOCYTE (OHS): 17.7 % (ref 18–48)
RELATIVE MONOCYTE (OHS): 13.4 % (ref 4–15)
RELATIVE NEUTROPHIL (OHS): 68 % (ref 38–73)
WBC # BLD AUTO: 4.19 K/UL (ref 3.9–12.7)

## 2025-06-20 PROCEDURE — 36415 COLL VENOUS BLD VENIPUNCTURE: CPT

## 2025-06-20 PROCEDURE — 85025 COMPLETE CBC W/AUTO DIFF WBC: CPT

## 2025-07-08 ENCOUNTER — PATIENT MESSAGE (OUTPATIENT)
Facility: CLINIC | Age: 40
End: 2025-07-08
Payer: COMMERCIAL

## 2025-07-08 ENCOUNTER — OFFICE VISIT (OUTPATIENT)
Dept: NEUROSURGERY | Facility: CLINIC | Age: 40
End: 2025-07-08
Payer: COMMERCIAL

## 2025-07-08 VITALS
WEIGHT: 172.63 LBS | SYSTOLIC BLOOD PRESSURE: 121 MMHG | DIASTOLIC BLOOD PRESSURE: 75 MMHG | HEIGHT: 71 IN | HEART RATE: 77 BPM | BODY MASS INDEX: 24.17 KG/M2

## 2025-07-08 DIAGNOSIS — R47.1 DYSARTHRIA: ICD-10-CM

## 2025-07-08 DIAGNOSIS — R26.81 GAIT INSTABILITY: ICD-10-CM

## 2025-07-08 DIAGNOSIS — C71.9 DIFFUSE MIDLINE GLIOMA, H3 K27M MUTANT: Primary | ICD-10-CM

## 2025-07-08 PROCEDURE — 1159F MED LIST DOCD IN RCRD: CPT | Mod: CPTII,S$GLB,, | Performed by: PSYCHIATRY & NEUROLOGY

## 2025-07-08 PROCEDURE — 3078F DIAST BP <80 MM HG: CPT | Mod: CPTII,S$GLB,, | Performed by: PSYCHIATRY & NEUROLOGY

## 2025-07-08 PROCEDURE — 99999 PR PBB SHADOW E&M-EST. PATIENT-LVL IV: CPT | Mod: PBBFAC,,, | Performed by: PSYCHIATRY & NEUROLOGY

## 2025-07-08 PROCEDURE — 3008F BODY MASS INDEX DOCD: CPT | Mod: CPTII,S$GLB,, | Performed by: PSYCHIATRY & NEUROLOGY

## 2025-07-08 PROCEDURE — G2211 COMPLEX E/M VISIT ADD ON: HCPCS | Mod: S$GLB,,, | Performed by: PSYCHIATRY & NEUROLOGY

## 2025-07-08 PROCEDURE — 99215 OFFICE O/P EST HI 40 MIN: CPT | Mod: S$GLB,,, | Performed by: PSYCHIATRY & NEUROLOGY

## 2025-07-08 PROCEDURE — 3074F SYST BP LT 130 MM HG: CPT | Mod: CPTII,S$GLB,, | Performed by: PSYCHIATRY & NEUROLOGY

## 2025-07-08 NOTE — PROGRESS NOTES
"Subjective:       Patient ID: Sukumar Wiseman is a 39 y.o. male.    Chief Complaint: diffuse midline glioma, [id number]    Follow-up      Oncologic History  8/2023: began noticing worsening double vision  11/2023: began having imbalance, intermittent headaches  12/2023: MRI brain ordered by PCP for symptoms revealed a right cerebellar mass  1/4/2024: subtotal resection (Luis Wallace) of the mass with pathology consistent with high grade glioma; molecular studies confirmed diagnosis of diffuse midline glioma, [id number], WHO grade 4  2/15-3/27/2024: radiation to 60Gy over 30fx ([facility])  4/25/2024: started [id number]/placebo trial with [facility]  5/23/2024: MRI brain with POD  6/2024: C1 lomustine  7/16/2024: C2 lomustine  8/2024: C3 lomustine  10/8/2024: C4 lomustine  11/30/2024: C5 lomustine @90mg/m2 - felt a little off balance with this. Delayed slightly due to EGD with botox for achalasia  1/2025: C6 lomustine  3/2025: C7 lomustine; C8 delayed due to thrombocytopenia  5/27/2025: C8 lomustine    Sukumar presents today with his wife, who helps provide the history.     NEUROLOGIC:  He reports ongoing neurological symptoms with significant day-to-day variability. Balance issues worsen with fatigue, with increased difficulty maintaining balance when tired. Speech difficulties are intermittent, described as "hit or miss" with some days having better speech clarity than others. He experiences challenges expressing thoughts and communicating effectively. Right-sided numbness is present, not complete but noticeable, with variable right-sided tongue numbness that fluctuates in intensity. He reports right ear hearing loss and is only able to hear on the left ear. Symptoms are more pronounced when experiencing increased fatigue, with work and stimulation contributing to overall exhaustion.    TREATMENT HISTORY:  He completed 8 cycles of Lomustine (CCNU). Pulmonary function test around the seventh cycle appeared normal. He inquired " about potential future treatment options and lifetime dosing considerations for Lomustine.    FUNCTIONAL STATUS AND FATIGUE:  He works full time and experiences significant fatigue at the end of workdays. He notes that being active at work helps improve his overall energy levels, stating that movement and work engagement are preferable to sedentary activities. He acknowledges experiencing tiredness but finds that professional activity provides some physical and mental stimulation that mitigates fatigue.    SUBSTANCE USE:  He reports occasional alcohol use of 1-2 glasses, acknowledging that alcohol worsens his existing balance issues and describes a sensation similar to being intoxicated even when not drinking extensively. He confirms drinking energy drinks.          Past Medical History:   Diagnosis Date    Cancer 12/21/2023    Headache     Hypertension     145/93,  not medicated    SVT (supraventricular tachycardia)       Current Outpatient Medications   Medication Sig Dispense Refill    acetaminophen (TYLENOL) 500 MG tablet Take 500 mg by mouth every 4 (four) hours as needed.      dexAMETHasone (DECADRON) 4 MG Tab --a single 2mg tablet every 6 hours for 3 days --a single 2mg tablet every 8 hours for 3 days --a single 2mg tablet every 12 hours for 4 days --a single 2mg tablet every morning for 4 days (Patient taking differently: --a single 2mg tablet every 6 hours for 3 days --a single 2mg tablet every 8 hours for 3 days --a single 2mg tablet every 12 hours for 4 days --a single 2mg tablet every morning for 4 days    Pt taking once daily 4mg as of 1.24.24) 33 tablet 0    EScitalopram oxalate (LEXAPRO) 10 MG tablet Take 1.5 tablets (15 mg total) by mouth once daily. 135 tablet 3    lomustine (GLEOSTINE) 100 MG capsule Take 100 mg (1 capsule) by mouth at bedtime on day 1. Total dose = 180 mg. 1 capsule 0    lomustine (GLEOSTINE) 40 MG capsule Take 80 mg (2 capsules) by mouth at bedtime on day 1. Total dose = 180 mg.  2 capsule 0    LORazepam (ATIVAN) 0.5 MG tablet Take 0.5 mg by mouth daily as needed.      metoprolol tartrate (LOPRESSOR) 25 MG tablet Take 1 tablet (25 mg total) by mouth 2 (two) times daily. 60 tablet 5    ondansetron (ZOFRAN) 8 MG tablet Take 8 mg by mouth every 8 (eight) hours as needed.      prochlorperazine (COMPAZINE) 10 MG tablet Take 10 mg by mouth once.      promethazine (PHENERGAN) 12.5 MG Tab Take 12.5 mg by mouth every 4 (four) hours as needed.      traMADoL (ULTRAM) 50 mg tablet Take 50 mg by mouth.       No current facility-administered medications for this visit.     Facility-Administered Medications Ordered in Other Visits   Medication Dose Route Frequency Provider Last Rate Last Admin    mupirocin 2 % ointment   Nasal On Call Procedure Cesar Mayberry MD   Given at 01/04/24 1058      Past Surgical History:   Procedure Laterality Date    ESOPHAGEAL MANOMETRY WITH MEASUREMENT OF IMPEDANCE N/A 8/12/2024    Procedure: MANOMETRY, ESOPHAGUS, WITH IMPEDANCE MEASUREMENT;  Surgeon: Vaughn Ramsey MD;  Location: [address]);  Service: Endoscopy;  Laterality: N/A;    ESOPHAGOGASTRODUODENOSCOPY N/A 8/7/2024    Procedure: EGD (ESOPHAGOGASTRODUODENOSCOPY);  Surgeon: Gail Chandler MD;  Location: [facility];  Service: General;  Laterality: N/A;  with dilation  1st  @ 6:00  EGD ATTEMPTED; ABORTED    ESOPHAGOGASTRODUODENOSCOPY N/A 8/12/2024    Procedure: EGD (ESOPHAGOGASTRODUODENOSCOPY);  Surgeon: Vaughn Ramsey MD;  Location: [address]);  Service: Endoscopy;  Laterality: N/A;    ESOPHAGOGASTRODUODENOSCOPY N/A 11/25/2024    Procedure: EGD (ESOPHAGOGASTRODUODENOSCOPY);  Surgeon: Krissy Burgos MD;  [address]  Service: Endoscopy;  Laterality: N/A;  propofol only/ sper pt / prep inst sent to pt viaportal/endoflip/ botox injection/ 3 days full 1 day clear, 2nd floor-end stage achalasia  10/15 pt r/s instructions to pt portal.cf  10/19 r/s update instr to portal.  ASam  11/18-spoke with wife-has johanna    SUBOCCIPITAL CRANIOTOMY Right 01/04/2024    Procedure: CRANIOTOMY, SUBOCCIPITAL MIS;  Surgeon: Luis Wallace MD;  Location: [facility] OR [address] [place] [address];  Service: Neurosurgery;  Laterality: Right;      Family History   Problem Relation Name Age of Onset    Hypertension Mother Clonia     Hyperlipidemia Mother Clonia     Hypertension Father      Colon cancer Maternal Grandfather Osman     Cancer Maternal Grandfather Osman       Social History     Tobacco Use    Smoking status: Never     Passive exposure: Never    Smokeless tobacco: Never   Substance Use Topics    Alcohol use: Not Currently    Drug use: Never   ; has two children ages 12 and 9       Review of Systems  KPS: 80      Objective:      Vitals:    07/08/25 1326   BP: 121/75   Pulse: 77         NEUROLOGICAL EXAMINATION:     MENTAL STATUS   Attention: normal. Concentration: normal.   Speech: (scanning)  Level of consciousness: alert    CRANIAL NERVES     CN V   Facial sensation intact.     CN VII   Facial expression full, symmetric.     CN VIII   Hearing impaired: impaired on right.    CN IX, X   Palate: asymmetric    CN XI   CN XI normal.     CN XII   CN XII normal.        Slightly disconjugate gaze  Saccadic intrusions in all directions with finger pursuit     MOTOR EXAM   Muscle bulk: normal    Strength   Strength 5/5 throughout.     SENSORY EXAM   Light touch normal.     GAIT AND COORDINATION     Gait  Gait: wide-based     Coordination   Finger to nose coordination: abnormal (dysmetria on the right)       Wide based gait  Unable to tandem       Per report, MRI of the brain from 7/2/2025 is stable. I have requested these images for my personal review.      Assessment:       Problem List Items Addressed This Visit          Oncology    Diffuse midline glioma, H3 K27M mutant - Primary     Other Visit Diagnoses         Gait instability        Relevant Orders    Ambulatory Referral/Consult to Physical Therapy      Dysarthria         Relevant Orders    Ambulatory Referral/Consult to Speech Therapy                  Plan:       Sukumar Wiseman is a 39 y.o. male with a cerebellar diffuse midline glioma, [id number], WHO grade 4 s/p subtotal resection 1/4/2024, radiation to 60Gy completed 3/2024, [id number] vs placebo trial 4-5/2024, with POD on MRI from 5/2024 presenting for follow up and treatment recommendations. Tumor diagnosis was heralded by imbalance and headache.     Diffuse midline glioma, [id number], WHO grade 4  - Completed 8 cycles of Lomustine (CCNU) with good response, now on monitoring phase.  - End of lomustine laboratory evaluation is unremarkable  - Concern for potential pulmonary fibrosis with prolonged Lomustine use, requiring careful consideration of further cycles.  - MRI shows stable disease with possible radiation effects; spectroscopy indicates some tumor presence but no growth over time.  - Considered Temozolomide as potential next treatment option if progression occurs, given prior exposure was only at low dose during radiation.  - Immunotherapy not currently a viable option for this rare diffuse midline glioma.  - Re-radiation could be considered in the future, but would likely be a shorter course due to brainstem sensitivity.  - Explained that radiation changes can wax and wane, potentially affecting symptoms.    BALANCE AND COORDINATION ISSUES:  - Referred patient for physical therapy to address balance issues.  - Explained that alcohol affects the cerebellum, which is already impacted in this patient, potentially worsening balance issues.    SPEECH DISTURBANCES:  - Referred patient for speech therapy to address speech issues.    FOLLOW-UP:  - Follow up after next MRI at [facility].  - Patient to bring MRI images on disk from [facility] to next appointment for review.                 Karen Og MD  Department of Neurology  Neuro-Oncology Section                      This note was generated with the assistance  of ambient listening technology. Verbal consent was obtained by the patient and accompanying visitor(s) for the recording of patient appointment to facilitate this note. I attest to having reviewed and edited the generated note for accuracy, though some syntax or spelling errors may persist. Please contact the author of this note for any clarification.         Coping Well

## 2025-07-10 ENCOUNTER — PATIENT MESSAGE (OUTPATIENT)
Dept: GASTROENTEROLOGY | Facility: CLINIC | Age: 40
End: 2025-07-10
Payer: COMMERCIAL

## 2025-07-11 ENCOUNTER — TELEPHONE (OUTPATIENT)
Dept: ENDOSCOPY | Facility: HOSPITAL | Age: 40
End: 2025-07-11
Payer: COMMERCIAL

## 2025-07-11 VITALS — WEIGHT: 170 LBS | HEIGHT: 71 IN | BODY MASS INDEX: 23.8 KG/M2

## 2025-07-11 DIAGNOSIS — R13.10 DYSPHAGIA, UNSPECIFIED TYPE: Primary | ICD-10-CM

## 2025-07-11 NOTE — TELEPHONE ENCOUNTER
----- Message from Krissy Burgos MD sent at 7/11/2025  7:51 AM CDT -----  Regarding: ASAP EGD with Botox  [facility] PROCEDURE ORDERS    CLEARANCE FOR PROCEDURES:  [ ] Not needed       PROCEDURES  [ ] EGD with Botox      FLOOR:    [ ] 2nd Floor    Reason for 2nd Floor:   Achalasia, complex medical problems    PREP    [ ] Full liquid diet 3 days       MEDICATIONS    Anticoagulation/antiplt agents:   [ ]  No    ORDER OF TESTING:  Day 1: EGD with Botox       [ ] Patient lives in [place], [place]    SCHEDULING PRIORITY  As soon as possible    URGENCY     [x] Medically NOT Urgent      DIAGNOSIS   [ ] Dysphagia        PHYSICIAN  [ ]  Ok with Dr. Burgos

## 2025-07-11 NOTE — TELEPHONE ENCOUNTER
Patient is scheduled for a Upper Endoscopy (EGD) on 8/27/25 with Dr. CHAPARRITA Burgos  Referral for procedure from [facility]

## 2025-08-12 ENCOUNTER — TELEPHONE (OUTPATIENT)
Dept: ENDOSCOPY | Facility: HOSPITAL | Age: 40
End: 2025-08-12
Payer: COMMERCIAL

## 2025-08-12 ENCOUNTER — PATIENT MESSAGE (OUTPATIENT)
Dept: GASTROENTEROLOGY | Facility: CLINIC | Age: 40
End: 2025-08-12
Payer: COMMERCIAL

## (undated) DEVICE — DRESSING SURGICAL 1/2X1/2

## (undated) DEVICE — SPRAY MASTISOL

## (undated) DEVICE — HOOK LONE STAR BLUNT 12MM

## (undated) DEVICE — SOL IRRI STRL WATER 1000ML

## (undated) DEVICE — CONNECTOR WATERJET ENDO

## (undated) DEVICE — TUBE FRAZIER 5MM 2FT SOFT TIP

## (undated) DEVICE — TOWEL OR DISP STRL BLUE 4/PK

## (undated) DEVICE — SPONGE DRY VIA GREEN

## (undated) DEVICE — CONTAINER SPECIMEN STRL 4OZ

## (undated) DEVICE — UNDERPAD DELUXE FLUFF 30X30IN

## (undated) DEVICE — DRAPE INCISE IOBAN 2 23X17IN

## (undated) DEVICE — KIT BIOGUARD AIR WTR SUC VALVE

## (undated) DEVICE — DIFFUSER

## (undated) DEVICE — DRAPE OPMI STERILE

## (undated) DEVICE — HEMOSTAT SURGICEL 4X8IN

## (undated) DEVICE — CANNULA SSOFT C02 MALE 14FT

## (undated) DEVICE — GOWN NONREINF SET-IN SLV 2XL

## (undated) DEVICE — DRAPE STERI INSTRUMENT 1018

## (undated) DEVICE — BUR ACORN 9.0MM MEDIIUM

## (undated) DEVICE — TAPE SURG MEDIPORE 6X72IN

## (undated) DEVICE — PINS SKULL ADULT MAYFIELD
Type: IMPLANTABLE DEVICE | Site: CRANIAL | Status: NON-FUNCTIONAL
Removed: 2024-01-04

## (undated) DEVICE — TUBING IRRIGATION W/ AIR

## (undated) DEVICE — VYCOR BRAIN ACCESS SYSTEM

## (undated) DEVICE — DURAPREP SURG SCRUB 26ML

## (undated) DEVICE — GOWN NONREINF SET-IN SLV XL

## (undated) DEVICE — NDL HYPO REG 25G X 1 1/2

## (undated) DEVICE — MARKERS SPHERZ PASSIVE

## (undated) DEVICE — TUBING SUC UNIV W/CONN 12FT

## (undated) DEVICE — BURR ROUTER TAPERED

## (undated) DEVICE — RUBBERBAND STERILE 3X1/8IN

## (undated) DEVICE — Device

## (undated) DEVICE — BURR ACORN 7.5MM

## (undated) DEVICE — PROBE TEMP SKIN 400 SERIES

## (undated) DEVICE — TRAY NEURO OMC

## (undated) DEVICE — DRAPE T THYROID STERILE

## (undated) DEVICE — BLOCK BITE ADULT 60FR

## (undated) DEVICE — LINER SUCTION CANNISTER REGUGA

## (undated) DEVICE — ELECTRODE REM PLYHSV RETURN 9

## (undated) DEVICE — TIP YANKAUERS BULB NO VENT

## (undated) DEVICE — SEALANT VISTASEAL FIBRIN 10ML

## (undated) DEVICE — CARTRIDGE OIL

## (undated) DEVICE — MARKER SKIN STND TIP BLUE BARR

## (undated) DEVICE — DRESSING TELFA N ADH 3X8

## (undated) DEVICE — ELECTRODE MEDI-TRACE 855 FOAM

## (undated) DEVICE — DRAPE THREE-QTR REINF 53X77IN

## (undated) DEVICE — KIT VIA CUSTOM PROCEDURE

## (undated) DEVICE — CORD BIPOLAR 12 FOOT